# Patient Record
Sex: FEMALE | Race: ASIAN | NOT HISPANIC OR LATINO | Employment: PART TIME | URBAN - METROPOLITAN AREA
[De-identification: names, ages, dates, MRNs, and addresses within clinical notes are randomized per-mention and may not be internally consistent; named-entity substitution may affect disease eponyms.]

---

## 2017-03-30 ENCOUNTER — TELEPHONE (OUTPATIENT)
Dept: OBSTETRICS AND GYNECOLOGY | Facility: CLINIC | Age: 37
End: 2017-03-30

## 2017-03-30 NOTE — TELEPHONE ENCOUNTER
----- Message from Rocío Taveras sent at 3/29/2017  4:43 PM CDT -----  Contact: Patient  X _1st Request  _  2nd Request  _  3rd Request    Who:CONRADO REYES [[id number]]    Why:Patient has a appointment for 03/30/2017 but she has started her menstrual cycle and she needs to reschedule her appointment     What Number to Call Back:Patient can be reached at [phone]    When to Expect a call back: (Before the end of the day)   -- if call after 3:00 call back will be tomorrow.

## 2017-06-19 ENCOUNTER — OFFICE VISIT (OUTPATIENT)
Dept: OBSTETRICS AND GYNECOLOGY | Facility: CLINIC | Age: 37
End: 2017-06-19
Attending: OBSTETRICS & GYNECOLOGY
Payer: COMMERCIAL

## 2017-06-19 VITALS
DIASTOLIC BLOOD PRESSURE: 68 MMHG | WEIGHT: 165.13 LBS | BODY MASS INDEX: 28.19 KG/M2 | HEIGHT: 64 IN | SYSTOLIC BLOOD PRESSURE: 104 MMHG

## 2017-06-19 DIAGNOSIS — Z01.419 ENCOUNTER FOR GYNECOLOGICAL EXAMINATION WITHOUT ABNORMAL FINDING: Primary | ICD-10-CM

## 2017-06-19 DIAGNOSIS — N94.10 DYSPAREUNIA, FEMALE: ICD-10-CM

## 2017-06-19 PROCEDURE — 99395 PREV VISIT EST AGE 18-39: CPT | Mod: S$GLB,,, | Performed by: OBSTETRICS & GYNECOLOGY

## 2017-06-19 PROCEDURE — 99999 PR PBB SHADOW E&M-EST. PATIENT-LVL II: CPT | Mod: PBBFAC,,, | Performed by: OBSTETRICS & GYNECOLOGY

## 2017-06-19 RX ORDER — DICYCLOMINE HYDROCHLORIDE 20 MG/1
TABLET ORAL
Refills: 3 | COMMUNITY
Start: 2017-06-13 | End: 2018-04-24 | Stop reason: ALTCHOICE

## 2017-06-19 NOTE — LETTER
June 19, 2017      Ny Kwong MD  [address]  [address]  [facility] [address]           [facility] [address]  [address]  [facility] [address]  Phone: [phone]  Fax: [phone]          Patient: Venkatesh Pritchett   MR Number: [id number]   YOB: 1980   Date of Visit: 6/19/2017       Dear Dr. Ny Kwong:    Thank you for referring Venkatesh Pritchett to me for evaluation. Attached you will find relevant portions of my assessment and plan of care.    If you have questions, please do not hesitate to call me. I look forward to following Venkatesh Pritchett along with you.    Sincerely,    Amarilys Lomeli MD    Enclosure  CC:  No Recipients    If you would like to receive this communication electronically, please contact [email or web address] or [phone] to request more information on [employer] access.    For providers and/or their staff who would like to refer a patient to [facility], please contact us through our one-stop-shop provider referral line, [facility], at [phone].    If you feel you have received this communication in error or would no longer like to receive these types of communications, please e-mail [email or web address]

## 2017-06-19 NOTE — PROGRESS NOTES
Subjective:       Patient ID: Venkatesh Pritchett is a 36 y.o. female.    Chief Complaint:  Well Woman and Pelvic Pain (Pt reports that last month, she had some pelvic pain. Pt says that she has not had any since then.)      History of Present Illness  HPI  Venkatesh Pritchett is a 36 y.o. female  NEW TO ME here for her annual GYN exam.  She reports recent pelvic pain which has resolved. She also reports pain with sex since the birth of her son who is 2 years old. She recently weaned him in 2017, and is considering trying for pregnancy. Her dyspareunia is with first entry, although occurs /worsens if sex continues for some time. She was seen last year for this discomfort and was noted to have a slight tear at the site of her laceration repair at the center of the introitus, which was treated with Estrace cream, and the lesion resolved, but the pain continues.   She describes her periods as regular, normal flow, lasting 4 days.   Denies break through bleeding.   Denies vaginal itching or irritation.  Denies vaginal discharge.  She is sexually active. She has had 1 partner for the past 13 years .  She uses no method for contraception.   History of abnormal pap: No  Last Pap: approximate date  and was normal  Last MMG: None  Last Colonoscopy:  None  Denies domestic violence. She does feel safe at home.     Past Medical History:   Diagnosis Date    IBS (irritable bowel syndrome)      Past Surgical History:   Procedure Laterality Date    DILATION AND CURETTAGE OF UTERUS      [facility]     Social History     Social History    Marital status:      Spouse name: N/A    Number of children: N/A    Years of education: N/A     Occupational History    Not on file.     Social History Main Topics    Smoking status: Never Smoker    Smokeless tobacco: Never Used    Alcohol use No    Drug use: No    Sexual activity: Yes     Partners: Male     Birth control/ protection: None      Comment:  since   "    Other Topics Concern    Not on file     Social History Narrative    No narrative on file     Family History   Problem Relation Age of Onset    Hypertension Neg Hx     Colon cancer Neg Hx     Amblyopia Neg Hx     Blindness Neg Hx     Cataracts Neg Hx     Glaucoma Neg Hx     Macular degeneration Neg Hx     Retinal detachment Neg Hx     Strabismus Neg Hx     Breast cancer Neg Hx     Ovarian cancer Neg Hx      OB History      Para Term  AB Living    2 1 1  1 1    SAB TAB Ectopic Multiple Live Births    1   0 1          /68   Ht 5' 4" (1.626 m)   Wt 74.9 kg (165 lb 2 oz)   LMP 2017 (Approximate)   Breastfeeding? No   BMI 28.34 kg/m²         GYN & OB History  Patient's last menstrual period was 2017 (approximate).   Date of Last Pap: 2015    OB History    Para Term  AB Living   2 1 1  1 1   SAB TAB Ectopic Multiple Live Births   1   0 1      # Outcome Date GA Lbr Maurice/2nd Weight Sex Delivery Anes PTL Lv   2 Term 03/26/15 41w1d / 01:26 3.714 kg (8 lb 3 oz) M Vag-Spont EPI  NICOLE      Birth Comments: possible absent rt kidney per ultrasound   1 SAB  8w0d             Complications: H/O dilation and curettage          Review of Systems  Review of Systems   Constitutional: Negative for activity change, appetite change, fatigue and unexpected weight change.   HENT: Negative.    Eyes: Negative for visual disturbance.   Respiratory: Negative for shortness of breath and wheezing.    Cardiovascular: Negative for chest pain, palpitations and leg swelling.   Gastrointestinal: Negative for abdominal pain, bloating and blood in stool.   Endocrine: Negative for diabetes and hair loss.   Genitourinary: Positive for dyspareunia. Negative for decreased libido.   Musculoskeletal: Negative for back pain and joint swelling.   Skin:  Negative for no acne and hair changes.   Neurological: Negative for headaches.   Hematological: Does not bruise/bleed easily. "   Psychiatric/Behavioral: Negative for depression and sleep disturbance. The patient is not nervous/anxious.    Breast: Negative for breast pain and nipple discharge          Objective:    Physical Exam:   Constitutional: She is oriented to person, place, and time. She appears well-developed and well-nourished.    HENT:   Head: Normocephalic and atraumatic.    Eyes: EOM are normal. Pupils are equal, round, and reactive to light.    Neck: Normal range of motion. Neck supple.    Cardiovascular: Normal rate and regular rhythm.     Pulmonary/Chest: Effort normal and breath sounds normal.   BREASTS: Symmetrical, no skin changes or visible lesions.  No palpable masses, nipple discharge bilaterally.          Abdominal: Soft. Bowel sounds are normal.     Genitourinary:       Pelvic exam was performed with patient supine.   Genitourinary Comments: PELVIC: Normal external genitalia without lesions. Vaginal introitus slightly narrowed with band of scarred mucosa at introitus with point tenderness at apex of fourchette.. Normal hair distribution.  Adequate perineal body, normal urethral meatus.  Vagina moist and well rugated without lesions or discharge.  Cervix pink, without lesions, discharge or tenderness.  No significant cystocele or rectocele.  Bimanual exam shows uterus to be normal size, regular, mobile and nontender.  Adnexa without masses or tenderness.               Musculoskeletal: Normal range of motion and moves all extremeties.       Neurological: She is alert and oriented to person, place, and time.    Skin: Skin is warm and dry.    Psychiatric: She has a normal mood and affect.          Assessment:        1. Encounter for gynecological examination without abnormal finding    2. Dyspareunia, female               Plan:      1. Encounter for gynecological examination without abnormal finding      2. Dyspareunia, female  Discussed narrowed introitus, and use of lubricants to ease discomfort with sex. Discussed  possible surgical release of narrowed tissue, but that there were no guarantees that this would alleviate dyspareunia, and that the area could improve with stretching. As patient plans to try for pregnancy soon, any surgery would delay conception as pelvic rest would be necessary for full healing.        Return in about 1 year (around 6/19/2018), or if symptoms worsen or fail to improve.

## 2017-07-17 ENCOUNTER — TELEPHONE (OUTPATIENT)
Dept: OBSTETRICS AND GYNECOLOGY | Facility: CLINIC | Age: 37
End: 2017-07-17

## 2017-07-17 NOTE — TELEPHONE ENCOUNTER
----- Message from Nessa Berry sent at 7/17/2017  2:10 PM CDT -----  Contact: Pt  x_ 1st Request  _ 2nd Request  _ 3rd Request    Who: Pt    Why: is calling in regards to a RX she is needing.     What Number to Call Back: [phone]    When to Expect a call back: (Before the end of the day)  -- if call after 3:00 call back will be tomorrow.

## 2017-07-17 NOTE — TELEPHONE ENCOUNTER
Pt needs a medication that her and Dr. Lomeli discussed, but she does npt remember the name of it. Pt stated that it was for when she is on her period. Pt stated that Dr. Lomeli never prescribed it. Pt informed that a message will be sent to Dr. Lomeli.

## 2017-07-19 DIAGNOSIS — N94.6 DYSMENORRHEA: Primary | ICD-10-CM

## 2017-07-19 DIAGNOSIS — N94.10 FEMALE DYSPAREUNIA: ICD-10-CM

## 2017-07-19 RX ORDER — NAPROXEN SODIUM 550 MG/1
550 TABLET ORAL 2 TIMES DAILY WITH MEALS
Qty: 60 TABLET | Refills: 4 | Status: SHIPPED | OUTPATIENT
Start: 2017-07-19 | End: 2017-08-18

## 2017-07-28 ENCOUNTER — OFFICE VISIT (OUTPATIENT)
Dept: URGENT CARE | Facility: CLINIC | Age: 37
End: 2017-07-28
Payer: COMMERCIAL

## 2017-07-28 VITALS
TEMPERATURE: 98 F | SYSTOLIC BLOOD PRESSURE: 111 MMHG | OXYGEN SATURATION: 99 % | BODY MASS INDEX: 27.31 KG/M2 | HEIGHT: 64 IN | DIASTOLIC BLOOD PRESSURE: 74 MMHG | HEART RATE: 85 BPM | WEIGHT: 160 LBS

## 2017-07-28 DIAGNOSIS — S29.011A STRAIN OF LEFT PECTORALIS MUSCLE, INITIAL ENCOUNTER: Primary | ICD-10-CM

## 2017-07-28 PROCEDURE — 99203 OFFICE O/P NEW LOW 30 MIN: CPT | Mod: S$GLB,,, | Performed by: FAMILY MEDICINE

## 2017-07-28 RX ORDER — FLUTICASONE PROPIONATE 50 MCG
2 SPRAY, SUSPENSION (ML) NASAL 2 TIMES DAILY
Refills: 1 | COMMUNITY
Start: 2017-07-13 | End: 2018-04-24 | Stop reason: ALTCHOICE

## 2017-07-28 RX ORDER — IBUPROFEN 400 MG/1
400 TABLET ORAL EVERY 6 HOURS PRN
Qty: 20 TABLET | Refills: 0 | Status: SHIPPED | OUTPATIENT
Start: 2017-07-28 | End: 2017-08-04

## 2017-07-28 RX ORDER — ACETAMINOPHEN 325 MG/1
325 TABLET ORAL EVERY 6 HOURS PRN
COMMUNITY
End: 2018-04-24 | Stop reason: ALTCHOICE

## 2017-07-29 NOTE — PATIENT INSTRUCTIONS
Ice pack when in pain.  Warm compress in between pain.  Ibuprofen.    Follow up with PCP/Specialist if not any better as discussed. To ER of [facility] for any worsening of symptoms.  Patient  voiced understanding and agreement.

## 2018-01-06 ENCOUNTER — OFFICE VISIT (OUTPATIENT)
Dept: URGENT CARE | Facility: CLINIC | Age: 38
End: 2018-01-06
Payer: COMMERCIAL

## 2018-01-06 VITALS
BODY MASS INDEX: 29.02 KG/M2 | HEART RATE: 78 BPM | RESPIRATION RATE: 20 BRPM | WEIGHT: 170 LBS | OXYGEN SATURATION: 99 % | HEIGHT: 64 IN | DIASTOLIC BLOOD PRESSURE: 70 MMHG | TEMPERATURE: 99 F | SYSTOLIC BLOOD PRESSURE: 100 MMHG

## 2018-01-06 DIAGNOSIS — H65.00 ACUTE SEROUS OTITIS MEDIA, RECURRENCE NOT SPECIFIED, UNSPECIFIED LATERALITY: ICD-10-CM

## 2018-01-06 DIAGNOSIS — H92.03 OTALGIA OF BOTH EARS: Primary | ICD-10-CM

## 2018-01-06 PROCEDURE — 99214 OFFICE O/P EST MOD 30 MIN: CPT | Mod: 25,S$GLB,, | Performed by: FAMILY MEDICINE

## 2018-01-06 PROCEDURE — 96372 THER/PROPH/DIAG INJ SC/IM: CPT | Mod: S$GLB,,, | Performed by: FAMILY MEDICINE

## 2018-01-06 RX ORDER — BETAMETHASONE SODIUM PHOSPHATE AND BETAMETHASONE ACETATE 3; 3 MG/ML; MG/ML
6 INJECTION, SUSPENSION INTRA-ARTICULAR; INTRALESIONAL; INTRAMUSCULAR; SOFT TISSUE
Status: COMPLETED | OUTPATIENT
Start: 2018-01-06 | End: 2018-01-06

## 2018-01-06 RX ORDER — AMOXICILLIN AND CLAVULANATE POTASSIUM 875; 125 MG/1; MG/1
1 TABLET, FILM COATED ORAL 2 TIMES DAILY
Qty: 30 TABLET | Refills: 0 | Status: SHIPPED | OUTPATIENT
Start: 2018-01-06 | End: 2018-01-21

## 2018-01-06 RX ADMIN — BETAMETHASONE SODIUM PHOSPHATE AND BETAMETHASONE ACETATE 6 MG: 3; 3 INJECTION, SUSPENSION INTRA-ARTICULAR; INTRALESIONAL; INTRAMUSCULAR; SOFT TISSUE at 06:01

## 2018-01-07 NOTE — PROGRESS NOTES
"Subjective:       Patient ID: Venkatesh Pritchett is a 37 y.o. female.    Vitals:  height is 5' 4" (1.626 m) and weight is 77.1 kg (170 lb). Her oral temperature is 98.9 °F (37.2 °C). Her blood pressure is 100/70 and her pulse is 78. Her respiration is 20 and oxygen saturation is 99%.     Chief Complaint: Otalgia    Otalgia    There is pain in both ears. This is a new problem. The current episode started in the past 7 days. The problem occurs constantly. The problem has been gradually worsening. There has been no fever. The pain is at a severity of 5/10. The pain is moderate. Associated symptoms include headaches and a sore throat. Pertinent negatives include no abdominal pain, coughing, diarrhea, rash or vomiting. She has tried nothing for the symptoms.     Review of Systems   Constitution: Positive for weakness. Negative for chills, fever and malaise/fatigue.   HENT: Positive for congestion, ear pain and sore throat. Negative for hoarse voice.    Eyes: Positive for redness. Negative for blurred vision and discharge.   Cardiovascular: Negative for chest pain, dyspnea on exertion and leg swelling.   Respiratory: Negative for cough, shortness of breath, sputum production and wheezing.    Skin: Negative for rash.   Musculoskeletal: Negative for back pain, joint pain and myalgias.   Gastrointestinal: Negative for abdominal pain, diarrhea, nausea and vomiting.   Neurological: Positive for headaches.   Psychiatric/Behavioral: The patient is not nervous/anxious.        Objective:      Physical Exam   Constitutional: She is oriented to person, place, and time. She appears well-developed and well-nourished. She is cooperative.  Non-toxic appearance. She does not appear ill. No distress.   HENT:   Head: Normocephalic and atraumatic.   Right Ear: Hearing, tympanic membrane, external ear and ear canal normal.   Left Ear: Hearing, tympanic membrane, external ear and ear canal normal.   Nose: Nose normal. No mucosal edema, rhinorrhea " or nasal deformity. No epistaxis. Right sinus exhibits no maxillary sinus tenderness and no frontal sinus tenderness. Left sinus exhibits no maxillary sinus tenderness and no frontal sinus tenderness.   Mouth/Throat: Uvula is midline, oropharynx is clear and moist and mucous membranes are normal. No trismus in the jaw. Normal dentition. No uvula swelling. No posterior oropharyngeal erythema.   Right tm with fluid and bulging.   Eyes: Conjunctivae and lids are normal. Right eye exhibits no discharge. Left eye exhibits no discharge. No scleral icterus.   Sclera clear bilat   Neck: Trachea normal, normal range of motion, full passive range of motion without pain and phonation normal. Neck supple.   Cardiovascular: Normal rate, regular rhythm, normal heart sounds, intact distal pulses and normal pulses.    Pulmonary/Chest: Effort normal and breath sounds normal. No respiratory distress.   Abdominal: Soft. Normal appearance and bowel sounds are normal. She exhibits no distension, no pulsatile midline mass and no mass. There is no tenderness.   Musculoskeletal: Normal range of motion. She exhibits no edema or deformity.   Neurological: She is alert and oriented to person, place, and time. She exhibits normal muscle tone. Coordination normal.   Skin: Skin is warm, dry and intact. She is not diaphoretic. No pallor.   Psychiatric: She has a normal mood and affect. Her speech is normal and behavior is normal. Judgment and thought content normal. Cognition and memory are normal.   Nursing note and vitals reviewed.      Assessment:       1. Otalgia of both ears    2. Acute serous otitis media, recurrence not specified, unspecified laterality        Plan:         Otalgia of both ears    Acute serous otitis media, recurrence not specified, unspecified laterality    Other orders  -     amoxicillin-clavulanate 875-125mg (AUGMENTIN) 875-125 mg per tablet; Take 1 tablet by mouth 2 (two) times daily.  Dispense: 30 tablet; Refill:  0        allegra d 12 one bid

## 2018-01-07 NOTE — PATIENT INSTRUCTIONS
Earache, No Infection (Adult)  Earaches can happen without an infection. This occurs when air and fluid build up behind the eardrum causing a feeling of fullness and discomfort and reduced hearing. This is called otitis media with effusion (OME) or serous otitis media. It means there is fluid in the middle ear. It is not the same as acute otitis media, which is typically from infection.  OME can happen when you have a cold if congestion blocks the passage that drains the middle ear. This passage is called the eustachian tube. OME may also occur with nasal allergies or after a bacterial middle ear infection.    The pain or discomfort may come and go. You may hear clicking or popping sounds when you chew or swallow. You may feel that your balance is off. Or you may hear ringing in the ear.  It often takes from several weeks up to 3 months for the fluid to clear on its own. Oral pain relievers and ear drops help if there is pain. Decongestants and antihistamines sometimes help. Antibiotics don't help since there is no infection. Your doctor may prescribe a nasal spray to help reduce swelling in the nose and eustachian tube. This can allow the ear to drain.  If your OME doesn't improve after 3 months, surgery may be used to drain the fluid and insert a small tube in the eardrum to allow continued drainage.  Because the middle ear fluid can become infected, it is important to watch for signs of an ear infection which may develop later. These signs include increased ear pain, fever, or drainage from the ear.  Home care  The following guidelines will help you care for yourself at home:  · You may use over-the-counter medicine as directed to control pain, unless another medicine was prescribed. If you have chronic liver or kidney disease or ever had a stomach ulcer or GI bleeding, talk with your doctor before using these medicines. Aspirin should never be used in anyone under 18 years of age who is ill with a fever. It  may cause severe liver damage.  · You may use over-the-counter decongestants such as phenylephrine or pseudoephedrine. But they are not always helpful. Don't use nasal spray decongestants more than 3 days. Longer use can make congestion worse. Prescription nasal sprays from your doctor don't typically have those restrictions.  · Antihistamines may help if you are also having allergy symptoms.  · You may use medicines such as guaifenesin to thin mucus and promote drainage.  Follow-up care  Follow up with your healthcare provider or as advised if you are not feeling better after 3 days.  When to seek medical advice  Call your healthcare provider right away if any of the following occur:  · Your ear pain gets worse or does not start to improve   · Fever of 100.4°F (38°C) or higher, or as directed by your healthcare provider  · Fluid or blood draining from the ear  · Headache or sinus pain  · Stiff neck  · Unusual drowsiness or confusion  Date Last Reviewed: 10/1/2016  © 3372-5938 [employer]. [address] [place] [address]. All rights reserved. This information is not intended as a substitute for professional medical care. Always follow your healthcare professional's instructions.        Reducing the Risk of Middle Ear Infections     Good handwashing can help your child prevent ear infections.   Most children have had at least one middle ear infection by the age of 2. Treatment may depend on whether the problem is acute or chronic. It also depends on how often it comes back and how long it lasts.  Reducing risk factors  Some behaviors or surroundings increase your childs risk of ear infection. Reducing such risk factors can be helpful at any point in treatment. The tips below may help:  · If your child goes to group , he or she runs a greater risk of getting colds or flu. This may then lead to an ear infection. Help prevent these illnesses by teaching your child to wash his or her hands  often.  · If your child has nasal allergies, do your best to control dust, mold, mildew, and pet hair in the house. Also stop or greatly limit your childs contact with secondhand smoke.  · If food allergies are a problem, identify the food that triggers the reaction. Help your child avoid it.  Watching and waiting  Sometimes it is better to proceed with caution in the following ways:  · If your child is diagnosed with an ear infection, the healthcare provider may prescribe antibiotics and will suggest a period of watchful waiting. This means not filling any prescriptions right away. Instead of antibiotics, a trial of medicines to relieve symptoms including those for pain or fever, is advised. This is along with waiting some time to see if a child improves without antibiotic therapy. Whether or not your healthcare provider prescribes immediate antibiotics or a period of watchful waiting depends on your child's age and risk factors.  · During this time, your child should be watched to see if his or her symptoms are improving and to make sure new symptoms, such as fever or vomiting, don't develop. If a child doesn't improve within a few days or develops new symptoms, antibiotics will usually be started.    Date Last Reviewed: 12/1/2016  © 8645-4072 [employer]. [address] [place] [address], [place] [address]. All rights reserved. This information is not intended as a substitute for professional medical care. Always follow your healthcare professional's instructions.

## 2018-02-20 ENCOUNTER — OFFICE VISIT (OUTPATIENT)
Dept: INTERNAL MEDICINE | Facility: CLINIC | Age: 38
End: 2018-02-20
Payer: COMMERCIAL

## 2018-02-20 VITALS
DIASTOLIC BLOOD PRESSURE: 66 MMHG | OXYGEN SATURATION: 99 % | TEMPERATURE: 98 F | HEART RATE: 86 BPM | HEIGHT: 64 IN | WEIGHT: 176.13 LBS | SYSTOLIC BLOOD PRESSURE: 116 MMHG | BODY MASS INDEX: 30.07 KG/M2

## 2018-02-20 DIAGNOSIS — R51.9 ACUTE NONINTRACTABLE HEADACHE, UNSPECIFIED HEADACHE TYPE: Primary | ICD-10-CM

## 2018-02-20 PROCEDURE — 99999 PR PBB SHADOW E&M-EST. PATIENT-LVL IV: CPT | Mod: PBBFAC,,, | Performed by: INTERNAL MEDICINE

## 2018-02-20 PROCEDURE — 99213 OFFICE O/P EST LOW 20 MIN: CPT | Mod: S$GLB,,, | Performed by: INTERNAL MEDICINE

## 2018-02-20 PROCEDURE — 3008F BODY MASS INDEX DOCD: CPT | Mod: S$GLB,,, | Performed by: INTERNAL MEDICINE

## 2018-02-20 RX ORDER — BUTALBITAL, ACETAMINOPHEN AND CAFFEINE 50; 325; 40 MG/1; MG/1; MG/1
1 TABLET ORAL EVERY 4 HOURS PRN
Qty: 30 TABLET | Refills: 0 | Status: SHIPPED | OUTPATIENT
Start: 2018-02-20 | End: 2018-03-22

## 2018-02-20 NOTE — PROGRESS NOTES
Subjective:       Patient ID: Venkatesh Pritchett is a 37 y.o. female.    Chief Complaint: Dizziness    Dizziness:   Chronicity:  Recurrent  Onset:  1 to 4 weeks ago  Progression since onset:  Waxing and waning and unchanged  Frequency:  Every few days  Sore Throat Pain Scale: associated with left parietal headache that is moderate.  Severity:  Mild  Dizziness characteristics:  Spacial disorientation   Associated symptoms: headaches.no ear pain, no fever, no nausea, no vomiting, no weakness and no chest pain.  Exacerbated by: anxiety.  Treatments tried:  Nothing  Improvements on treatment:  No relief   PMH includes: CT head.no strokes, no neurologic disease, no head trauma and no head trauma.   has been told she has migraine headache.  CT head and neck was negative a few years ago    Review of Systems   Constitutional: Negative for activity change, chills, fatigue and fever.   HENT: Negative for congestion, ear pain, nosebleeds, postnasal drip, sinus pressure and sore throat.    Eyes: Negative.  Negative for visual disturbance.   Respiratory: Negative for cough, chest tightness, shortness of breath and wheezing.    Cardiovascular: Negative for chest pain.   Gastrointestinal: Negative for abdominal pain, diarrhea, nausea and vomiting.   Genitourinary: Negative for difficulty urinating, dysuria, frequency and urgency.   Musculoskeletal: Negative for arthralgias and neck stiffness.   Skin: Negative for rash.   Neurological: Positive for dizziness and headaches. Negative for weakness.   Psychiatric/Behavioral: Negative for sleep disturbance. The patient is not nervous/anxious.        Objective:      Physical Exam   Constitutional: She is oriented to person, place, and time. She appears well-developed and well-nourished.  Non-toxic appearance. No distress.   HENT:   Head: Normocephalic and atraumatic.   Right Ear: Tympanic membrane, external ear and ear canal normal.   Left Ear: Tympanic membrane, external ear and ear canal  normal.   Eyes: Pupils are equal, round, and reactive to light. No scleral icterus. Right eye exhibits nystagmus. Left eye exhibits nystagmus.       Neck: Normal range of motion. Neck supple. No thyromegaly present.   Cardiovascular: Normal rate, regular rhythm and normal heart sounds.    Pulmonary/Chest: Effort normal and breath sounds normal.   Abdominal: Soft. Bowel sounds are normal. She exhibits no mass. There is no tenderness. There is no rebound.   Musculoskeletal: Normal range of motion.   Lymphadenopathy:     She has no cervical adenopathy.   Neurological: She is alert and oriented to person, place, and time. She has normal reflexes. She displays normal reflexes. No cranial nerve deficit. She exhibits normal muscle tone. Coordination normal.   Skin: Skin is warm and dry.   Psychiatric: She has a normal mood and affect. Her behavior is normal.       Assessment:       1. Acute nonintractable headache, unspecified headache type        Plan:   Venkatesh was seen today for dizziness.    Diagnoses and all orders for this visit:    Acute nonintractable headache, unspecified headache type  -     Ambulatory consult to Neurology    Other orders  -     butalbital-acetaminophen-caffeine -40 mg (FIORICET, ESGIC) -40 mg per tablet; Take 1 tablet by mouth every 4 (four) hours as needed for Pain.

## 2018-02-27 ENCOUNTER — TELEPHONE (OUTPATIENT)
Dept: NEUROLOGY | Facility: CLINIC | Age: 38
End: 2018-02-27

## 2018-02-27 NOTE — TELEPHONE ENCOUNTER
----- Message from Laurel Wilson sent at 2/27/2018  9:47 AM CST -----  Contact: Patient's , Milton Dupont  Patient's  is calling to schedule an appointment for patient from a referral.  Please call to schedule.  Call Back#[phone]  Thanks

## 2018-02-27 NOTE — TELEPHONE ENCOUNTER
Called pt's  and scheduled new patient appointment. Appointment successfully made, pt added to wait list. Verbalized understanding.

## 2018-02-28 ENCOUNTER — TELEPHONE (OUTPATIENT)
Dept: NEUROLOGY | Facility: CLINIC | Age: 38
End: 2018-02-28

## 2018-02-28 NOTE — TELEPHONE ENCOUNTER
Spoke with Mrs. Pritchett-    I called to inquire about the reason for an appt she has booked with our office (Neurology) on Friday with Ramila CLINTON, she informed me that she's coming in to see someone for headaches. I explained that the N.P. She's scheduled with ONLY specializes in headaches, when asked if she was having dizziness she said yes, I offered to schedule her with another provider who can treat both but she asked to keep her appt Friday.

## 2018-03-02 ENCOUNTER — OFFICE VISIT (OUTPATIENT)
Dept: NEUROLOGY | Facility: CLINIC | Age: 38
End: 2018-03-02
Payer: COMMERCIAL

## 2018-03-02 VITALS
BODY MASS INDEX: 30.11 KG/M2 | DIASTOLIC BLOOD PRESSURE: 68 MMHG | WEIGHT: 176.38 LBS | HEIGHT: 64 IN | SYSTOLIC BLOOD PRESSURE: 102 MMHG | HEART RATE: 73 BPM

## 2018-03-02 DIAGNOSIS — E66.9 OBESITY (BMI 30.0-34.9): ICD-10-CM

## 2018-03-02 DIAGNOSIS — R51.9 WORSENING HEADACHES: ICD-10-CM

## 2018-03-02 DIAGNOSIS — F41.9 ANXIETY: ICD-10-CM

## 2018-03-02 DIAGNOSIS — G47.9 TROUBLE IN SLEEPING: ICD-10-CM

## 2018-03-02 DIAGNOSIS — R51.9 CHRONIC NONINTRACTABLE HEADACHE, UNSPECIFIED HEADACHE TYPE: Primary | ICD-10-CM

## 2018-03-02 DIAGNOSIS — G89.29 CHRONIC NONINTRACTABLE HEADACHE, UNSPECIFIED HEADACHE TYPE: Primary | ICD-10-CM

## 2018-03-02 DIAGNOSIS — R53.83 FATIGUE, UNSPECIFIED TYPE: ICD-10-CM

## 2018-03-02 DIAGNOSIS — M79.18 MYOFASCIAL PAIN: ICD-10-CM

## 2018-03-02 DIAGNOSIS — R42 DIZZINESS: ICD-10-CM

## 2018-03-02 PROCEDURE — 99204 OFFICE O/P NEW MOD 45 MIN: CPT | Mod: S$GLB,,, | Performed by: NURSE PRACTITIONER

## 2018-03-02 PROCEDURE — 99999 PR PBB SHADOW E&M-EST. PATIENT-LVL III: CPT | Mod: PBBFAC,,, | Performed by: NURSE PRACTITIONER

## 2018-03-02 RX ORDER — AMITRIPTYLINE HYDROCHLORIDE 10 MG/1
10 TABLET, FILM COATED ORAL NIGHTLY
Qty: 30 TABLET | Refills: 5 | Status: SHIPPED | OUTPATIENT
Start: 2018-03-02 | End: 2018-04-24 | Stop reason: ALTCHOICE

## 2018-03-02 RX ORDER — TIZANIDINE 2 MG/1
TABLET ORAL
Qty: 30 TABLET | Refills: 2 | Status: SHIPPED | OUTPATIENT
Start: 2018-03-02 | End: 2018-04-24 | Stop reason: ALTCHOICE

## 2018-03-02 RX ORDER — DICLOFENAC SODIUM 75 MG/1
TABLET, DELAYED RELEASE ORAL
Qty: 20 TABLET | Refills: 2 | Status: SHIPPED | OUTPATIENT
Start: 2018-03-02 | End: 2018-04-03

## 2018-03-02 NOTE — PATIENT INSTRUCTIONS
Sleep Hygiene:  1. Avoid watching TV, eating, and discussing emotional issues in bed. The bed should be used for sleep and sex only. If not, we can associate the bed with other activities and it often becomes difficult to fall asleep.  2. Minimize noise, light, and temperature extremes during sleep with ear plugs, window blinds, or an electric blanket or air conditioner. Even the slightest nighttime noises or luminescent lights can disrupt the quality of your sleep. Try to keep your bedroom at a comfortable temperature -- not too hot (above 75 degrees) or too cold (below 54 degrees).  3. Try not to drink fluids after 8 p.m. This may reduce awakenings due to urination.  4. Avoid naps, but if you do nap, make it no more than about 25 minutes about eight hours after you awake. But if you have problems falling asleep, then no naps for you.  5. Do not expose your self to bright light if you need to get up at night. Use a small night-light instead.  6. Nicotine is a stimulant and should be avoided particularly near bedtime and upon night awakenings. Having a smoke before bed, although it may feel relaxing, is actually putting a stimulant into your bloodstream.  7. Caffeine is also a stimulant and is present in coffee (100-200 mg), soda (50-75 mg), tea (50-75 mg), and various over-the-counter medications. Caffeine should be discontinued at least four to six hours before bedtime. If you consume large amounts of caffeine and you cut your self off too quickly, beware; you may get headaches that could keep you awake.  8. Although alcohol is a depressant and may help you fall asleep, the subsequent metabolism that clears it from your body when you are sleeping causes a withdrawal syndrome. This withdrawal causes awakenings and is often associated with nightmares and sweats.  9. A light snack may be sleep-inducing, but a heavy meal too close to bedtime interferes with sleep. Stay away from protein and stick to carbohydrates or  dairy products. Milk contains the amino acid L-tryptophan, which has been shown in research to help people go to sleep. So milk and cookies or crackers (without chocolate) may be useful and taste good as well.      Please start tracking your headaches, you can use a journal or just write it down on a piece of pater.    Information I want you to pay attention to is:  - Write down what days you get a headache  - How long does the headache last  - What symptoms come with the headache -  Please pay attention to wthether or not you are sensitive to light or sound     As discussed, please make an effort to take some time out of the day to take care of yourself.  Exercise is a good way to relieve stress, will also help with weight loss.      Hopefully, the Elavil and Zanaflex nightly will get you some good sleep!!

## 2018-03-02 NOTE — PROGRESS NOTES
SUBJECTIVE:  Patient ID: Venkatesh Pritchett   MRN: [id number]  Referred By: Dr. Kimberly Miller  Chief Complaint: Headache and Consult    History of Present Illness:   37 y.o. female with headaches, allergies, IBS, anxiety, depression, and trouble sleeping, who presents to clinic with her  for evaluation of headaches.  History is provided by both the patient and her .     Headaches initially began in her early thirties and have persisted since.  Headaches are mainly on the left side above her eye and in the parietal region, described as pounding, pressure, but can also be stabbing pains.  Pain waxes and wanes throughout the day, though she reports when she is busy she is not as aware of it as when she is thinking about it. Onset is abrupt. Pain can be rated anywhere from 3-9 out of 10, reports pain becomes severe 3 days per week.  Headaches last between 6 hours up to all day, have experienced headaches which lasted 6 days in the past.  Associated symptoms include photo/phonophobia (with severe headaches) and some dizziness. Worsened by movement/activity.   Headaches have been occurring daily since October.  She reports having gone back home with her son from August to October, she was experiencing headaches while in [place], but they have become far more frequent since she returned.  Both the patient and her  she has been very home sick since coming back to the [place], she adds it is very difficult to raise a young child without any help here, her  works long hours and she has no family here to help her and feels overwhelmed.  While in [place], she was seen by her Neurologist there who did not feel headaches were coming from a secondary source, though she continues to have concerns there is something wrong with her which is causing the headaches. In the past, she was started on Amitriptyline, which was effective for migraine prevention.  She has had CT Scan of her head before in the past which was  reportedly normal.  She does admit the headache she is currently experiencing are similar to headaches she has always had, just more frequent and more intense.    She secondarily has been experiencing bouts of dizziness, which she describes as feeling as though the room is spinning.  She reports these symptoms can occur with or without the headaches.  Only occurs when she goes from a sitting to standing position.    Associated Symptoms - photo/phonophobia   Triggers - anxiety, poor sleep   Aggravating Factors - anxiety   Alleviating Factors - sleep, relaxing   Head Trauma? Infection? Fever? Cancer? Pregnancy? <-- denies   Recent Changes - returned home from [place], weight gain    Sleep - no trouble falling asleep, but wakes up every 2-3 hours, she does not feel rested in the morning   Positives in bold: Hx of Kidney Stones, asthma, GI bleed, osteoporosis, CAD/MI, CVA/TIA, DM <-- denies     Treatments Tried and Response  Fioricet - no      Current Medications:    butalbital-acetaminophen-caffeine -40 mg (FIORICET, ESGIC) -40 mg per tablet, Take 1 tablet by mouth every 4 (four) hours as needed for Pain., Disp: 30 tablet, Rfl: 0    acetaminophen (TYLENOL) 325 MG tablet, Take 325 mg by mouth every 6 (six) hours as needed for Pain., Disp: , Rfl:     amitriptyline (ELAVIL) 10 MG tablet, Take 1 tablet (10 mg total) by mouth every evening., Disp: 30 tablet, Rfl: 5    diclofenac (VOLTAREN) 75 MG EC tablet, 1 tab every 8-10 hours as needed for headaches. Max 2 tabs/day. Max 3 days/week., Disp: 20 tablet, Rfl: 2    dicyclomine (BENTYL) 20 mg tablet, TAKE ONE-HALF TABLET (TEN MG) BY MOUTH THREE TIMES DAILY, Disp: , Rfl: 3    fluticasone (FLONASE) 50 mcg/actuation nasal spray, 2 sprays by Each Nare route 2 (two) times daily., Disp: , Rfl: 1    tiZANidine (ZANAFLEX) 2 MG tablet, 1/2 tab by mouth around bedtime x 3 days, then 1 tab nightly.  No driving., Disp: 30 tablet, Rfl: 2    Review of Systems - as per HPI,  "otherwise a balanced 10 systems review is negative.    OBJECTIVE:  Vitals:  /68   Pulse 73   Ht 5' 4" (1.626 m)   Wt 80 kg (176 lb 5.9 oz)   LMP 02/19/2018   BMI 30.27 kg/m²     Physical Exam   Constitutional: She appears well-developed and well-nourished. She is well groomed. NAD  HENT:    Head: Normocephalic and atraumatic, oral and nasal mucosa intact.  Frontalis was TTP, temporalis was TTP on left, NTTP on right    Eyes: Conjunctivae and EOM are normal, slight left eye exopthalmos. Pupils are equal, round, and reactive to light   Neck: Neck supple. Occiput NTTP and trapezius TTP bilaterally.  Traps and paraspinals tight bilaterally.  DROM of neck in all 6 directions.   Musculoskeletal: Normal range of motion. No joint stiffness. No vertebral point tenderness.  Skin: Skin is warm and dry.  Psychiatric: Mood is anxious      Neuro exam:    Mental status:  The patient is awake, alert, and oriented to person, place and time.  Language is intact and fluent  Remote and recent memory are intact  Normal attention and concentration  Mood is stable    Cranial Nerves:  Fundoscopic examination does not reveal any occult papilledema.    Pupils are equal and reactive to light.    Extraocular movements are intact and without nystagmus.    Visual fields are full to confrontation testing.   Facial movement is symmetric.  Facial sensation is intact.    Hearing is intact to finger rub   Uvula in midline. Tongue in midline without fasciculation.   Shoulder shrug symmetrical.    Coordination:     Finger to nose - normal and symmetric bilaterally   Heel to shin test - normal and symmetric bilaterally     Motor:  Normal muscle bulk and symmetry. No fasciculations were noted.   Tremor not apparent   Pronator drift not apparent.    strength was strong and symmetric   Finger extension strength was strong and symmetric   RUE:appropriate against gravity and medium force as tested 5/5  LUE: appropriate against gravity and " medium force as tested 5/5  RLE:appropriate against gravity and medium force as tested 5/5              LLE: appropriate against gravity and medium force as tested 5/5    Reflexes:  Right Brachioradialis 2+  Left Brachioradialis 2+  Right Biceps 2+  Left Biceps 2+  Right Patellar2+  Left Patellar 2+                          Plantar flexion bilat   Aguilar was negative bilaterally      Sensory:  RUE  intact light touch  LUE intact light touch    RLE intact light touch  LLE intact light touch    Gait:   Romberg - negative  Normal gait  Tandem, Heel, and Toe Walk - able to perform without difficulty     Review of Data:   Notes from Dr. Bazan and urgent care reviewed   Labs:  No visits with results within 3 Month(s) from this visit.   Latest known visit with results is:   Office Visit on 05/10/2016   Component Date Value Ref Range Status    Specimen UA 05/10/2016 Urine, Clean Catch   Final    Color, UA 05/10/2016 Yellow  Yellow, Straw, Edna Final    Appearance, UA 05/10/2016 Clear  Clear Final    pH, UA 05/10/2016 7.0  5.0 - 8.0 Final    Specific Gravity, UA 05/10/2016 1.020  1.005 - 1.030 Final    Protein, UA 05/10/2016 Negative  Negative Final    Glucose, UA 05/10/2016 Negative  Negative Final    Ketones, UA 05/10/2016 Negative  Negative Final    Bilirubin (UA) 05/10/2016 Negative  Negative Final    Occult Blood UA 05/10/2016 Negative  Negative Final    Nitrite, UA 05/10/2016 Negative  Negative Final    Urobilinogen, UA 05/10/2016 Negative  <2.0 EU/dL Final    Leukocytes, UA 05/10/2016 Negative  Negative Final     Results for CONRADO REYES (MRN [id number]) as of 3/5/2018 11:27   Ref. Range 8/27/2015 16:16   Vit D, 25-Hydroxy Latest Ref Range: 30 - 96 ng/mL 22 (L)   Results for CONRADO REYES (MRN [id number]) as of 3/5/2018 11:27   Ref. Range 3/27/2015 06:20   WBC Latest Ref Range: 3.90 - 12.70 K/uL 15.27 (H)   RBC Latest Ref Range: 4.00 - 5.40 M/uL 3.73 (L)   Hemoglobin Latest Ref Range: 12.0 - 16.0 g/dL 10.5  (L)   Hematocrit Latest Ref Range: 37.0 - 48.5 % 31.7 (L)   MCV Latest Ref Range: 82 - 98 fL 85   MCH Latest Ref Range: 27.0 - 31.0 pg 28.2   MCHC Latest Ref Range: 32.0 - 36.0 % 33.1   RDW Latest Ref Range: 11.5 - 14.5 % 15.0 (H)   Platelets Latest Ref Range: 150 - 350 K/uL 206   MPV Latest Ref Range: 9.2 - 12.9 fL 9.1 (L)   Gran% Latest Ref Range: 38.0 - 73.0 % 74.0 (H)   Gran # (ANC) Latest Ref Range: 1.8 - 7.7 K/uL 11.3 (H)   Lymph% Latest Ref Range: 18.0 - 48.0 % 18.4   Lymph # Latest Ref Range: 1.0 - 4.8 K/uL 2.8   Mono% Latest Ref Range: 4.0 - 15.0 % 6.2   Mono # Latest Ref Range: 0.3 - 1.0 K/uL 0.9   Eosinophil% Latest Ref Range: 0.0 - 8.0 % 1.1   Eos # Latest Ref Range: 0.0 - 0.5 K/uL 0.2   Basophil% Latest Ref Range: 0.0 - 1.9 % 0.3   Baso # Latest Ref Range: 0.00 - 0.20 K/uL 0.04     Note: I have independently reviewed any/all imaging/labs/tests and agree with the report (s) as documented.  Any discrepancies will be as noted/demarcated by free text.  MARY BETH BRANDON 3/2/2018    ASSESSMENT:  1. Chronic nonintractable headache, unspecified headache type    2. Anxiety    3. Trouble in sleeping    4. Dizziness    5. Myofascial pain    6. Fatigue, unspecified type    7. Worsening headaches    8. Obesity (BMI 30.0-34.9)      PLAN:  - Lengthy discussion regarding headaches, as headaches are similar to the headaches she has always experienced and neuro exam is unremarkable, I do not feel repeat imaging is needed at this time, but offered to order MRI Brain to give her peace of mind, which she deferred at this time.  Multiple lifestyle factors likely contributing to headaches including anxiety, poor sleep hygiene, being homesick, poor diet, lack of exercise, lack of support system in  and weight gain.    - CBC, CMP, Vitamin D, TSH, and free T4 ordered - has had low vitamin D level in past, not currently taking a supplements   - To help with headache prevention, sleep, and anxiety - start Elavil 10 mg  - Will also  "start Zanaflex 2 mg nightly to help with neck stiffness and sleep   - For acute headaches - given diclofenac 75 mg tabs to be taken every 8-10 hours as needed for headaches   - Sleep hygiene tips provided   - Strongly advised she begin tracking her headaches as she is describing migrainous and non-migrainous features, discussed diligent tracking can help narrow down the type of headache she is suffering from   - Recommended she make an effort to create some "me time" each day, also encouraged her to follow a healthy diet and begin exercising routinely   - RTC in 1 month or sooner if needed      Orders Placed This Encounter    CBC auto differential    Comprehensive metabolic panel    VITAMIN D    TSH    T4, FREE    amitriptyline (ELAVIL) 10 MG tablet    tiZANidine (ZANAFLEX) 2 MG tablet    diclofenac (VOLTAREN) 75 MG EC tablet     I have discussed realistic goals of care with patient at length as well as medication options, and need for lifestyle adjustment. I have explained that treatment will take time. We have agreed that the goal will be to reduce frequency/intensity/quantity of HA, not to be completely HA free. I have explained my non narcotic policy regarding headache treatment.    Patient to track frequency of headaches.  Patient agreeable to work on lifestyle adjustments.    I spent 50 minutes face-to-face with the patient with >50% of the time spent with counseling and education regarding:  - results of data, diagnosis, and recommendations stated above  - risks, benefits, and potential side effects of amitriptyline, zanaflex, and diclofenac discussed   - alternative treatment options including nortriptyline vs effexor offered   - importance of healthy diet, regular exercise and sleep hygiene in the treatment of headaches      Questions and concerns were sought and answered to the patient's stated verbal satisfaction.  The patient verbalizes understanding and agreement with the above stated treatment " plan.       Ramila Benjamin, FNP-C  [facility]  [phone]    Dr. Faustin was available during today's encounter.

## 2018-03-05 ENCOUNTER — TELEPHONE (OUTPATIENT)
Dept: NEUROLOGY | Facility: CLINIC | Age: 38
End: 2018-03-05

## 2018-03-13 ENCOUNTER — NURSE TRIAGE (OUTPATIENT)
Dept: ADMINISTRATIVE | Facility: CLINIC | Age: 38
End: 2018-03-13

## 2018-03-13 NOTE — TELEPHONE ENCOUNTER
Reason for Disposition   Neurologic deficit that was brief (now gone), ANY of the following: * Weakness of the face, arm, or leg on one side of the body * Numbness of the face, arm, or leg on one side of the body * Loss of speech or garbled speech    Protocols used: ST NEUROLOGIC DEFICIT-A-[place]  Caller transferred by scheduling. Patient's  Milton reports that Ms. Pritchett is experiencing numbness in her shoulder and leg with a rapid heart beat, but symptoms have improved. He is not present with the patient. Unable to perform triage. Caller advised to bring patient to ED.

## 2018-04-03 ENCOUNTER — OFFICE VISIT (OUTPATIENT)
Dept: OBSTETRICS AND GYNECOLOGY | Facility: CLINIC | Age: 38
End: 2018-04-03
Attending: OBSTETRICS & GYNECOLOGY
Payer: COMMERCIAL

## 2018-04-03 VITALS
HEIGHT: 64 IN | WEIGHT: 174.81 LBS | DIASTOLIC BLOOD PRESSURE: 60 MMHG | BODY MASS INDEX: 29.84 KG/M2 | SYSTOLIC BLOOD PRESSURE: 118 MMHG

## 2018-04-03 DIAGNOSIS — R35.0 URINARY FREQUENCY: Primary | ICD-10-CM

## 2018-04-03 DIAGNOSIS — R10.2 PELVIC PRESSURE IN FEMALE: ICD-10-CM

## 2018-04-03 DIAGNOSIS — R39.15 URINARY URGENCY: ICD-10-CM

## 2018-04-03 PROCEDURE — 99999 PR PBB SHADOW E&M-EST. PATIENT-LVL III: CPT | Mod: PBBFAC,,, | Performed by: OBSTETRICS & GYNECOLOGY

## 2018-04-03 PROCEDURE — 99213 OFFICE O/P EST LOW 20 MIN: CPT | Mod: S$GLB,,, | Performed by: OBSTETRICS & GYNECOLOGY

## 2018-04-03 PROCEDURE — 87086 URINE CULTURE/COLONY COUNT: CPT

## 2018-04-03 PROCEDURE — 81003 URINALYSIS AUTO W/O SCOPE: CPT

## 2018-04-03 NOTE — PATIENT INSTRUCTIONS
Preparing for Pregnancy  Even before you become pregnant, your health matters to your future baby. Adopt good health habits today. And take care of any medical problems you have before becoming pregnant.  Remember: As soon as you know you are pregnant, get regular prenatal care.   Things to consider  Read through the list below. The more items that describe you, the healthier you may be.  · I eat a balanced diet with fruits, vegetables, and whole grains  · I keep physically active.  · I have my health problems under control.  · My weight is about right.  · I dont smoke.  · I dont use recreational drugs.  · I dont have a drinking problem.  Think about the following:  · Who will help you through pregnancy and with childcare?  · Do you have health insurance?  · Do you have the money needed to cover childcare and other day-to-day child expenses?  · Will you be able to take the time you need away from your job for maternity needs and childcare?  Adopt a healthy lifestyle  Each of the following tips can improve your health as you prepare for pregnancy:  · Take a daily vitamin supplement that contains iron and folic acid (a vitamin that reduces the chance of some birth defects)   · Eat a high-fiber diet rich in fruits and vegetables.  · Exercise 3 or more times a week and at least 150 minutes weekly.  · Get within 15 pounds of your ideal weight.  The first weeks of pregnancy are the most important time in a babys development. Before you become pregnant:  · Dont use recreational drugs.  · Dont drink alcohol.  · Dont smoke.  Working with your healthcare provider  Your healthcare provider can help answer any questions you may have. Do you know when to stop taking birth control pills? Are any over-the-counter medicines safe for pregnant women? You can also ask about special care you may need if you have any of the following:  · Sexually transmitted diseases (STDs), like herpes or chlamydia  · Diabetes  · High blood  pressure  · Other chronic health problems   Date Last Reviewed: 8/16/2015  © 3862-5174 [employer]. [address] [place] [address], [place] [address]. All rights reserved. This information is not intended as a substitute for professional medical care. Always follow your healthcare professional's instructions.

## 2018-04-03 NOTE — PROGRESS NOTES
Venkatesh Pritchett is a 37 y.o. female patient   NEW TO ME (former patient of Dr. Kwong) who presents today with complaints of 4 days of pelvic pressure and urinary frequency and urgency. She and her spouse are trying for pregnancy since last month.   Periods are regular, every 28-30 days, lasting 4-5 days, moderate.     Patient's last menstrual period was 03/15/2018 (approximate).    Past Medical History:   Diagnosis Date    IBS (irritable bowel syndrome)      Past Surgical History:   Procedure Laterality Date    DILATION AND CURETTAGE OF UTERUS      MAB     Social History     Social History    Marital status:      Spouse name: N/A    Number of children: N/A    Years of education: N/A     Occupational History    Not on file.     Social History Main Topics    Smoking status: Never Smoker    Smokeless tobacco: Never Used    Alcohol use No    Drug use: No    Sexual activity: Yes     Partners: Male     Birth control/ protection: None      Comment:  since      Other Topics Concern    Not on file     Social History Narrative    No narrative on file     Family History   Problem Relation Age of Onset    Hypertension Neg Hx     Colon cancer Neg Hx     Amblyopia Neg Hx     Blindness Neg Hx     Cataracts Neg Hx     Glaucoma Neg Hx     Macular degeneration Neg Hx     Retinal detachment Neg Hx     Strabismus Neg Hx     Breast cancer Neg Hx     Ovarian cancer Neg Hx      OB History      Para Term  AB Living    2 1 1   1 1    SAB TAB Ectopic Multiple Live Births    1     0 1                ROS:  GENERAL: Feeling well overall.   SKIN: Denies rash or lesions.   HEAD: Denies head injury or headache.   NODES: Denies enlarged lymph nodes.   CHEST: Denies chest pain or shortness of breath.   CARDIOVASCULAR: Denies palpitations or left sided chest pain.   ABDOMEN: No abdominal pain, nausea, vomiting or rectal bleeding.   URINARY: No dysuria, hematuria, or burning on  "urination.  REPRODUCTIVE: See HPI.   BREASTS: Denies pain, lumps, or nipple discharge.   HEMATOLOGIC: No easy bruisability or excessive bleeding.   MUSCULOSKELETAL: Denies joint pain or swelling.   NEUROLOGIC: Denies syncope or weakness.   PSYCHIATRIC: Denies depression.    /60   Ht 5' 4" (1.626 m)   Wt 79.3 kg (174 lb 13.2 oz)   LMP 03/15/2018 (Approximate)   BMI 30.01 kg/m²     PE:   APPEARANCE: Well nourished, well developed, in no acute distress.  ABDOMEN: Soft. No tenderness or masses. No hernias. No CVA tenderness.  VULVA: No lesions. Normal female genitalia.  URETHRAL MEATUS: Normal size and location, no lesions, no prolapse.  URETHRA: No masses, tenderness, prolapse or scarring.  VAGINA: Moist and well rugated, no discharge, no significant cystocele or rectocele.  CERVIX: No lesions and discharge.   UTERUS: Normal size, regular shape, mobile, non-tender, bladder base nontender.  ADNEXA: No masses, tenderness or CDS nodularity.  ANUS PERINEUM: Normal.    PROCEDURES:    1. Urinary frequency  Urine culture    Urinalysis   2. Urinary urgency  Urine culture    Urinalysis   3. Pelvic pressure in female      AND PLAN:      1. Urinary frequency    - Urine culture  - Urinalysis    2. Urinary urgency    - Urine culture  - Urinalysis    3. Pelvic pressure in female         Follow-up if symptoms worsen or fail to improve.    "

## 2018-04-04 LAB
BILIRUB UR QL STRIP: NEGATIVE
CLARITY UR REFRACT.AUTO: ABNORMAL
COLOR UR AUTO: YELLOW
GLUCOSE UR QL STRIP: NEGATIVE
HGB UR QL STRIP: NEGATIVE
KETONES UR QL STRIP: NEGATIVE
LEUKOCYTE ESTERASE UR QL STRIP: NEGATIVE
NITRITE UR QL STRIP: NEGATIVE
PH UR STRIP: 5 [PH] (ref 5–8)
PROT UR QL STRIP: NEGATIVE
SP GR UR STRIP: 1.02 (ref 1–1.03)
URN SPEC COLLECT METH UR: ABNORMAL
UROBILINOGEN UR STRIP-ACNC: NEGATIVE EU/DL

## 2018-04-05 LAB — BACTERIA UR CULT: NORMAL

## 2018-04-24 ENCOUNTER — NURSE TRIAGE (OUTPATIENT)
Dept: ADMINISTRATIVE | Facility: CLINIC | Age: 38
End: 2018-04-24

## 2018-04-24 ENCOUNTER — HOSPITAL ENCOUNTER (EMERGENCY)
Facility: HOSPITAL | Age: 38
Discharge: HOME | End: 2018-04-24
Attending: EMERGENCY MEDICINE
Payer: COMMERCIAL

## 2018-04-24 VITALS
HEIGHT: 64 IN | HEART RATE: 83 BPM | DIASTOLIC BLOOD PRESSURE: 69 MMHG | OXYGEN SATURATION: 97 % | TEMPERATURE: 98 F | RESPIRATION RATE: 18 BRPM | SYSTOLIC BLOOD PRESSURE: 135 MMHG | WEIGHT: 168 LBS | BODY MASS INDEX: 28.68 KG/M2

## 2018-04-24 DIAGNOSIS — R10.84 GENERALIZED ABDOMINAL PAIN: Primary | ICD-10-CM

## 2018-04-24 LAB
ALBUMIN SERPL BCP-MCNC: 3.8 G/DL
ALP SERPL-CCNC: 90 U/L
ALT SERPL W/O P-5'-P-CCNC: 9 U/L
ANION GAP SERPL CALC-SCNC: 9 MMOL/L
AST SERPL-CCNC: 11 U/L
B-HCG UR QL: NEGATIVE
BASOPHILS # BLD AUTO: 0.04 K/UL
BASOPHILS NFR BLD: 0.5 %
BILIRUB SERPL-MCNC: 1.4 MG/DL
BUN SERPL-MCNC: 10 MG/DL
CALCIUM SERPL-MCNC: 9.6 MG/DL
CHLORIDE SERPL-SCNC: 104 MMOL/L
CO2 SERPL-SCNC: 25 MMOL/L
CREAT SERPL-MCNC: 0.8 MG/DL
CTP QC/QA: YES
DIFFERENTIAL METHOD: NORMAL
EOSINOPHIL # BLD AUTO: 0.1 K/UL
EOSINOPHIL NFR BLD: 0.7 %
ERYTHROCYTE [DISTWIDTH] IN BLOOD BY AUTOMATED COUNT: 12.8 %
EST. GFR  (AFRICAN AMERICAN): >60 ML/MIN/1.73 M^2
EST. GFR  (NON AFRICAN AMERICAN): >60 ML/MIN/1.73 M^2
GLUCOSE SERPL-MCNC: 96 MG/DL
HCT VFR BLD AUTO: 39.8 %
HGB BLD-MCNC: 13.2 G/DL
IMM GRANULOCYTES # BLD AUTO: 0.03 K/UL
IMM GRANULOCYTES NFR BLD AUTO: 0.3 %
LIPASE SERPL-CCNC: 20 U/L
LYMPHOCYTES # BLD AUTO: 2.4 K/UL
LYMPHOCYTES NFR BLD: 27.1 %
MCH RBC QN AUTO: 27.7 PG
MCHC RBC AUTO-ENTMCNC: 33.2 G/DL
MCV RBC AUTO: 84 FL
MONOCYTES # BLD AUTO: 0.4 K/UL
MONOCYTES NFR BLD: 4.3 %
NEUTROPHILS # BLD AUTO: 5.8 K/UL
NEUTROPHILS NFR BLD: 67.1 %
NRBC BLD-RTO: 0 /100 WBC
PLATELET # BLD AUTO: 292 K/UL
PMV BLD AUTO: 9.5 FL
POTASSIUM SERPL-SCNC: 3.7 MMOL/L
PROT SERPL-MCNC: 7.8 G/DL
RBC # BLD AUTO: 4.76 M/UL
SODIUM SERPL-SCNC: 138 MMOL/L
WBC # BLD AUTO: 8.7 K/UL

## 2018-04-24 PROCEDURE — 85025 COMPLETE CBC W/AUTO DIFF WBC: CPT

## 2018-04-24 PROCEDURE — 99283 EMERGENCY DEPT VISIT LOW MDM: CPT | Mod: 25

## 2018-04-24 PROCEDURE — 80053 COMPREHEN METABOLIC PANEL: CPT

## 2018-04-24 PROCEDURE — 81025 URINE PREGNANCY TEST: CPT | Performed by: EMERGENCY MEDICINE

## 2018-04-24 PROCEDURE — 83690 ASSAY OF LIPASE: CPT

## 2018-04-24 PROCEDURE — 99283 EMERGENCY DEPT VISIT LOW MDM: CPT | Mod: ,,, | Performed by: EMERGENCY MEDICINE

## 2018-04-24 RX ORDER — DICYCLOMINE HYDROCHLORIDE 20 MG/1
20 TABLET ORAL EVERY 6 HOURS PRN
Qty: 20 TABLET | Refills: 0 | Status: SHIPPED | OUTPATIENT
Start: 2018-04-24 | End: 2018-05-24

## 2018-04-24 NOTE — ED TRIAGE NOTES
Presents to ER with black stool in the toilet for 2 days and 1 episode of diarrhea yesterday.  Presents a stool specimen today that is dark brown.  Has a hx of IBS.  Pt identifiers checked and correct    LOC:   · The pt is awake, alert, and aware of environment with an appropriate affect,  as well as speaking appropriately; AAOx3 to person, place, and situation  APPEARANCE:   · Pt resting comfortably and in no acute distress; clean and well groomed  SKIN:   · Skin is warm and dry; color consistent with ethnicity; pt has normal skin turgor and moist mucus membranes  · Skin intact w/ no breakdown or brusing noted  MUSCULOSKELETAL:   · Pt moving all extremities well; absent of obvious deformities; Ambulates independently  RESPIRATORY:   · Airway is open and patent; respirations are spontaneous; normal effort and rate noted; absent of accessory-muscle use  · Breath sounds auscultated in all lung fields are noted to be clear and absent of adventitious sounds  CARDIAC:   · Pt denies chest pain; normal heart sounds auscultated; Pt has no peripheral edema noted; capillary refill < 3 seconds  · 2+ pulses palpated in all extremities   ABDOMEN:   · Soft and non-tender to palpation; no abnormal distention noted/reported; bowel sounds present x 4  NEUROLOGIC:   · PERRL, 3mm bilaterally, eyes open spontaneously; pt follows commands; facial expression symmetrical; equal hand  bilaterally; purposeful motor response noted  · Normal sensation to touch reported in distal region of all extremities

## 2018-04-24 NOTE — TELEPHONE ENCOUNTER
Spoke with patients , regarding message, he states that stools were black for a few days, no nausea, no dizziness, she will keep her appointment today, and they have a stool sample.

## 2018-04-24 NOTE — ED NOTES
Discharge home with family, states understanding to follow up as directed. Ambulates out of ED without difficulty. Discharged per physician, to call for results of labs when home.

## 2018-04-24 NOTE — TELEPHONE ENCOUNTER
Reason for Disposition   Bloody, black, or tarry bowel movements    Protocols used: ST RECTAL BLEEDING-A-[place]    EC states pt has appt scheduled for later today, calling to see if he can get sooner appt. EC states that pt has black stools symptoms started 2 days, slight dizziness. Care advice given.

## 2018-04-24 NOTE — ED PROVIDER NOTES
Encounter Date: 4/24/2018    SCRIBE #1 NOTE: I, Anthony Root, am scribing for, and in the presence of,  Dr. Schulz . I have scribed the entire note.       History     Chief Complaint   Patient presents with    Melena     took pepto and wanting to get checked      Time patient was seen by the provider: 12:27 PM    The patient is a 37 y.o. female with co-morbidities including: irritable bowel syndrome who presents to the ED with a complaint of melena for 2 days after taking Pepto for abdominal pain. Pt called her PCP and was told to come to the ED because she is traveling [place] tomorrow and wanted to make sure there was no blood in her stool.      The history is provided by the patient.     Review of patient's allergies indicates:  No Known Allergies  Past Medical History:   Diagnosis Date    IBS (irritable bowel syndrome)      Past Surgical History:   Procedure Laterality Date    DILATION AND CURETTAGE OF UTERUS  2000    [facility]AB     Family History   Problem Relation Age of Onset    Hypertension Neg Hx     Colon cancer Neg Hx     Amblyopia Neg Hx     Blindness Neg Hx     Cataracts Neg Hx     Glaucoma Neg Hx     Macular degeneration Neg Hx     Retinal detachment Neg Hx     Strabismus Neg Hx     Breast cancer Neg Hx     Ovarian cancer Neg Hx      Social History   Substance Use Topics    Smoking status: Never Smoker    Smokeless tobacco: Never Used    Alcohol use No     Review of Systems   Constitutional: Negative for fever.   HENT: Negative for sore throat.    Respiratory: Negative for shortness of breath.    Cardiovascular: Negative for chest pain.   Gastrointestinal: Positive for abdominal pain. Negative for nausea.        + melena    Genitourinary: Negative for dysuria.   Musculoskeletal: Negative for back pain.   Skin: Negative for rash.   Neurological: Negative for weakness.   Hematological: Does not bruise/bleed easily.       Physical Exam     Initial Vitals [04/24/18 1203]   BP Pulse Resp  Temp SpO2   135/69 83 18 98.3 °F (36.8 °C) 97 %      MAP       91         Physical Exam    Nursing note and vitals reviewed.  Constitutional: She appears well-developed and well-nourished.   HENT:   Head: Normocephalic and atraumatic.   Eyes: Conjunctivae and EOM are normal. Pupils are equal, round, and reactive to light.   Cardiovascular: Normal rate, regular rhythm and normal heart sounds.   Abdominal: Soft. Bowel sounds are normal. There is no tenderness.   Genitourinary: Rectal exam shows guaiac negative stool. Guaiac negative stool.   Neurological: She is alert and oriented to person, place, and time.   Skin: Skin is warm and dry. No rash noted.         ED Course   Procedures  Labs Reviewed   COMPREHENSIVE METABOLIC PANEL - Abnormal; Notable for the following:        Result Value    Total Bilirubin 1.4 (*)     ALT 9 (*)     All other components within normal limits   CBC W/ AUTO DIFFERENTIAL   LIPASE   POCT URINE PREGNANCY             Medical Decision Making:   History:   Old Medical Records: I decided to obtain old medical records.  Initial Assessment:   I checked her stool and it was brown hem negative for blood. Will order blood test. She asked to be discharged and will follow up with internal medicine this afternoon. I do not believe she has a surgical abdomen.   Clinical Tests:   Lab Tests: Ordered and Reviewed            Scribe Attestation:   Scribe #1: I performed the above scribed service and the documentation accurately describes the services I performed. I attest to the accuracy of the note.               Clinical Impression:   The encounter diagnosis was Generalized abdominal pain.    Disposition:   Disposition: Discharged  Condition: Stable                        Nilo Schulz MD  04/27/18 8880

## 2018-12-06 ENCOUNTER — OFFICE VISIT (OUTPATIENT)
Dept: URGENT CARE | Facility: CLINIC | Age: 38
End: 2018-12-06
Payer: COMMERCIAL

## 2018-12-06 VITALS
HEIGHT: 64 IN | DIASTOLIC BLOOD PRESSURE: 73 MMHG | TEMPERATURE: 98 F | HEART RATE: 103 BPM | SYSTOLIC BLOOD PRESSURE: 121 MMHG | RESPIRATION RATE: 18 BRPM | BODY MASS INDEX: 28.84 KG/M2 | OXYGEN SATURATION: 96 %

## 2018-12-06 DIAGNOSIS — J30.1 SEASONAL ALLERGIC RHINITIS DUE TO POLLEN: ICD-10-CM

## 2018-12-06 DIAGNOSIS — J02.9 SORE THROAT: Primary | ICD-10-CM

## 2018-12-06 DIAGNOSIS — J06.9 URI, ACUTE: ICD-10-CM

## 2018-12-06 LAB
CTP QC/QA: YES
S PYO RRNA THROAT QL PROBE: NEGATIVE

## 2018-12-06 PROCEDURE — 3008F BODY MASS INDEX DOCD: CPT | Mod: CPTII,S$GLB,, | Performed by: FAMILY MEDICINE

## 2018-12-06 PROCEDURE — 96372 THER/PROPH/DIAG INJ SC/IM: CPT | Mod: S$GLB,,, | Performed by: FAMILY MEDICINE

## 2018-12-06 PROCEDURE — 99214 OFFICE O/P EST MOD 30 MIN: CPT | Mod: 25,S$GLB,, | Performed by: FAMILY MEDICINE

## 2018-12-06 PROCEDURE — 87880 STREP A ASSAY W/OPTIC: CPT | Mod: QW,S$GLB,, | Performed by: FAMILY MEDICINE

## 2018-12-06 RX ORDER — LORATADINE 10 MG/1
10 TABLET ORAL DAILY
Qty: 30 TABLET | Refills: 0 | Status: SHIPPED | OUTPATIENT
Start: 2018-12-06 | End: 2019-01-05

## 2018-12-06 RX ORDER — AMOXICILLIN 875 MG/1
875 TABLET, FILM COATED ORAL 2 TIMES DAILY
Qty: 20 TABLET | Refills: 0 | Status: SHIPPED | OUTPATIENT
Start: 2018-12-06 | End: 2018-12-16

## 2018-12-06 RX ORDER — BETAMETHASONE SODIUM PHOSPHATE AND BETAMETHASONE ACETATE 3; 3 MG/ML; MG/ML
6 INJECTION, SUSPENSION INTRA-ARTICULAR; INTRALESIONAL; INTRAMUSCULAR; SOFT TISSUE
Status: COMPLETED | OUTPATIENT
Start: 2018-12-06 | End: 2018-12-06

## 2018-12-06 RX ADMIN — BETAMETHASONE SODIUM PHOSPHATE AND BETAMETHASONE ACETATE 6 MG: 3; 3 INJECTION, SUSPENSION INTRA-ARTICULAR; INTRALESIONAL; INTRAMUSCULAR; SOFT TISSUE at 07:12

## 2018-12-07 NOTE — PROGRESS NOTES
"Subjective:       Patient ID: Venkatesh Pritchett is a 38 y.o. female.    Vitals:  height is 5' 4" (1.626 m). Her temperature is 98.3 °F (36.8 °C). Her blood pressure is 121/73 and her pulse is 103. Her respiration is 18 and oxygen saturation is 96%.     Chief Complaint: Sore Throat    39 y/o female with c/o sore throat, post nasal drip and earache, just returned from [place] visit, feels fundown  No fever      Sore Throat    This is a new problem. The current episode started yesterday. The problem has been gradually worsening. The pain is worse on the right side. There has been no fever. The pain is at a severity of 7/10. The pain is moderate. Associated symptoms include ear pain, swollen glands and trouble swallowing. Pertinent negatives include no congestion, coughing, shortness of breath, stridor or vomiting. She has tried nothing for the symptoms. The treatment provided no relief.       Constitution: Negative for chills, sweating, fatigue and fever.   HENT: Positive for ear pain, sore throat and trouble swallowing. Negative for congestion, sinus pain, sinus pressure and voice change.    Neck: Negative for painful lymph nodes.   Eyes: Negative for eye redness.   Respiratory: Negative for chest tightness, cough, sputum production, bloody sputum, COPD, shortness of breath, stridor, wheezing and asthma.    Gastrointestinal: Negative for nausea and vomiting.   Musculoskeletal: Negative for muscle ache.   Skin: Negative for rash.   Allergic/Immunologic: Negative for seasonal allergies and asthma.   Hematologic/Lymphatic: Negative for swollen lymph nodes.       Objective:      Physical Exam   Constitutional: She is oriented to person, place, and time. She appears well-developed and well-nourished. She is cooperative.  Non-toxic appearance. She does not appear ill. No distress.   HENT:   Head: Normocephalic and atraumatic.   Right Ear: Hearing, tympanic membrane, external ear and ear canal normal.   Left Ear: Hearing, " tympanic membrane, external ear and ear canal normal.   Nose: Nose normal. No mucosal edema, rhinorrhea or nasal deformity. No epistaxis. Right sinus exhibits no maxillary sinus tenderness and no frontal sinus tenderness. Left sinus exhibits no maxillary sinus tenderness and no frontal sinus tenderness.   Mouth/Throat: Uvula is midline, oropharynx is clear and moist and mucous membranes are normal. No trismus in the jaw. Normal dentition. No uvula swelling. No oropharyngeal exudate or posterior oropharyngeal erythema.   Eyes: Conjunctivae and lids are normal. Right eye exhibits no discharge. Left eye exhibits no discharge. No scleral icterus.   Sclera clear bilat   Neck: Trachea normal, normal range of motion, full passive range of motion without pain and phonation normal. Neck supple. No JVD present. No tracheal deviation present.   Cardiovascular: Normal rate, regular rhythm, normal heart sounds, intact distal pulses and normal pulses. Exam reveals no friction rub.   No murmur heard.  Pulmonary/Chest: Effort normal and breath sounds normal. No stridor. She has no wheezes.   Abdominal: Soft. Normal appearance and bowel sounds are normal. She exhibits no distension, no pulsatile midline mass and no mass. There is no tenderness.   Musculoskeletal: Normal range of motion. She exhibits no edema or deformity.   Lymphadenopathy:     She has no cervical adenopathy.   Neurological: She is alert and oriented to person, place, and time. She exhibits normal muscle tone. Coordination normal.   Skin: Skin is warm, dry and intact. She is not diaphoretic. No pallor.   Psychiatric: She has a normal mood and affect. Her speech is normal and behavior is normal. Judgment and thought content normal. Cognition and memory are normal.   Nursing note and vitals reviewed.      Assessment:       1. Sore throat    2. Seasonal allergic rhinitis due to pollen    3. URI, acute        Plan:         Sore throat  -     POCT rapid strep  A    Seasonal allergic rhinitis due to pollen    URI, acute    Other orders  -     betamethasone acetate-betamethasone sodium phosphate injection 6 mg  -     loratadine (CLARITIN) 10 mg tablet; Take 1 tablet (10 mg total) by mouth once daily.  Dispense: 30 tablet; Refill: 0  -     amoxicillin (AMOXIL) 875 MG tablet; Take 1 tablet (875 mg total) by mouth 2 (two) times daily. for 10 days  Dispense: 20 tablet; Refill: 0     Claritin one daily  mucinex dm one bid  Tylenol alternate with motrin 2 every 6 hours  Return to clinic if Symptoms worsens

## 2018-12-14 ENCOUNTER — OFFICE VISIT (OUTPATIENT)
Dept: NEUROLOGY | Facility: CLINIC | Age: 38
End: 2018-12-14
Payer: COMMERCIAL

## 2018-12-14 VITALS
HEIGHT: 64 IN | WEIGHT: 163.13 LBS | HEART RATE: 83 BPM | DIASTOLIC BLOOD PRESSURE: 71 MMHG | BODY MASS INDEX: 27.85 KG/M2 | SYSTOLIC BLOOD PRESSURE: 99 MMHG

## 2018-12-14 DIAGNOSIS — M79.18 MYOFASCIAL PAIN: ICD-10-CM

## 2018-12-14 DIAGNOSIS — G43.009 MIGRAINE WITHOUT AURA AND WITHOUT STATUS MIGRAINOSUS, NOT INTRACTABLE: Primary | ICD-10-CM

## 2018-12-14 PROCEDURE — 3008F BODY MASS INDEX DOCD: CPT | Mod: CPTII,S$GLB,, | Performed by: NURSE PRACTITIONER

## 2018-12-14 PROCEDURE — 99214 OFFICE O/P EST MOD 30 MIN: CPT | Mod: S$GLB,,, | Performed by: NURSE PRACTITIONER

## 2018-12-14 PROCEDURE — 99999 PR PBB SHADOW E&M-EST. PATIENT-LVL III: CPT | Mod: PBBFAC,,, | Performed by: NURSE PRACTITIONER

## 2018-12-14 RX ORDER — DICLOFENAC SODIUM 75 MG/1
75 TABLET, DELAYED RELEASE ORAL 2 TIMES DAILY PRN
Qty: 40 TABLET | Refills: 2 | Status: SHIPPED | OUTPATIENT
Start: 2018-12-14 | End: 2020-09-03

## 2018-12-14 RX ORDER — TIZANIDINE 2 MG/1
TABLET ORAL
Qty: 60 TABLET | Refills: 11 | Status: SHIPPED | OUTPATIENT
Start: 2018-12-14 | End: 2020-11-09

## 2018-12-14 RX ORDER — AMITRIPTYLINE HYDROCHLORIDE 10 MG/1
10 TABLET, FILM COATED ORAL NIGHTLY
Qty: 30 TABLET | Refills: 11 | Status: SHIPPED | OUTPATIENT
Start: 2018-12-14 | End: 2020-03-04 | Stop reason: SDUPTHER

## 2018-12-14 NOTE — PROGRESS NOTES
"Established Patient   SUBJECTIVE:  Patient ID: Venkatesh Pritchett   Chief Complaint: Follow-up    History of Present Illness:  Venkatesh Pritchett is a 38 y.o. female who presents to clinic with her  for follow-up of headaches.     Recommendations made at last Office Visit on 3/2/2018:  - Lengthy discussion regarding headaches, as headaches are similar to the headaches she has always experienced and neuro exam is unremarkable, I do not feel repeat imaging is needed at this time, but offered to order MRI Brain to give her peace of mind, which she deferred at this time.  Multiple lifestyle factors likely contributing to headaches including anxiety, poor sleep hygiene, being homesick, poor diet, lack of exercise, lack of support system in [place] and weight gain.    - CBC, CMP, Vitamin D, TSH, and free T4 ordered - has had low vitamin D level in past, not currently taking a supplements   - To help with headache prevention, sleep, and anxiety - start Elavil 10 mg  - Will also start Zanaflex 2 mg nightly to help with neck stiffness and sleep   - For acute headaches - given diclofenac 75 mg tabs to be taken every 8-10 hours as needed for headaches   - Sleep hygiene tips provided   - Strongly advised she begin tracking her headaches as she is describing migrainous and non-migrainous features, discussed diligent tracking can help narrow down the type of headache she is suffering from   - Recommended she make an effort to create some "me time" each day, also encouraged her to follow a healthy diet and begin exercising routinely   - RTC in 1 month or sooner if needed      12/14/2018 - Interval History:  Headaches have been occurring once per week and last anywhere from a few hours to all day in duration.  She was taking Elavil and Tizanidine for headache and migraine prevention, which was effective, however she has not had any medication for the last few months as she was in [place] visiting family for the last 6-7 months.  She would " like to restart her previous medications.  Headaches and migraines continue to feel the same as they have always felt, she denies any change in the quality or nature of her migraines.     History of Present Illness:   37 y.o. female with headaches, allergies, IBS, anxiety, depression, and trouble sleeping, who presents to clinic with her  for evaluation of headaches.  History is provided by both the patient and her .     Headaches initially began in her early thirties and have persisted since.  Headaches are mainly on the left side above her eye and in the parietal region, described as pounding, pressure, but can also be stabbing pains.  Pain waxes and wanes throughout the day, though she reports when she is busy she is not as aware of it as when she is thinking about it. Onset is abrupt. Pain can be rated anywhere from 3-9 out of 10, reports pain becomes severe 3 days per week.  Headaches last between 6 hours up to all day, have experienced headaches which lasted 6 days in the past.  Associated symptoms include photo/phonophobia (with severe headaches) and some dizziness. Worsened by movement/activity.   Headaches have been occurring daily since October.  She reports having gone back home with her son from August to October, she was experiencing headaches while in [place], but they have become far more frequent since she returned.  Both the patient and her  she has been very home sick since coming back to the [place], she adds it is very difficult to raise a young child without any help here, her  works long hours and she has no family here to help her and feels overwhelmed.  While in [place], she was seen by her Neurologist there who did not feel headaches were coming from a secondary source, though she continues to have concerns there is something wrong with her which is causing the headaches. In the past, she was started on Amitriptyline, which was effective for migraine prevention.  She has  "had CT Scan of her head before in the past which was reportedly normal.  She does admit the headache she is currently experiencing are similar to headaches she has always had, just more frequent and more intense.    She secondarily has been experiencing bouts of dizziness, which she describes as feeling as though the room is spinning.  She reports these symptoms can occur with or without the headaches.  Only occurs when she goes from a sitting to standing position.    Associated Symptoms - photo/phonophobia   Triggers - anxiety, poor sleep   Aggravating Factors - anxiety   Alleviating Factors - sleep, relaxing   Head Trauma? Infection? Fever? Cancer? Pregnancy? <-- denies   Recent Changes - returned home from [place], weight gain    Sleep - no trouble falling asleep, but wakes up every 2-3 hours, she does not feel rested in the morning   Positives in bold: Hx of Kidney Stones, asthma, GI bleed, osteoporosis, CAD/MI, CVA/TIA, DM <-- denies      Treatments Tried and Response  Fioricet - no    Elavil - helped in past, re-prescribed today   Diclofenac - given today   Tizanidine - given today, helped in the past     Current Medications:    amitriptyline (ELAVIL) 10 MG tablet, Take 1 tablet (10 mg total) by mouth every evening., Disp: 30 tablet, Rfl: 11    diclofenac (VOLTAREN) 75 MG EC tablet, Take 1 tablet (75 mg total) by mouth 2 (two) times daily as needed., Disp: 40 tablet, Rfl: 2    loratadine (CLARITIN) 10 mg tablet, Take 1 tablet (10 mg total) by mouth once daily., Disp: 30 tablet, Rfl: 0    tiZANidine (ZANAFLEX) 2 MG tablet, Take 1-2 tabs around bedtime. No alcohol, no driving with medication., Disp: 60 tablet, Rfl: 11    Review of Systems - as per HPI, otherwise a balanced 10 systems review is negative.    OBJECTIVE:  Vitals:  BP 99/71   Pulse 83   Ht 5' 4" (1.626 m)   Wt 74 kg (163 lb 2.3 oz)   BMI 28.00 kg/m²      Physical Exam:  Constitutional: she appears well-developed and well-nourished. she is well " groomed. NAD   HENT:    Head: Normocephalic and atraumatic  Eyes: Conjunctivae and EOM are normal  Musculoskeletal: Normal range of motion. No joint stiffness.   Skin: Skin is warm and dry.  Psychiatric: Mood and affect are normal    Neuro: Patient is alert and oriented to person, place, and time. Language is intact and fluent.  Recent and remote memory are intact.  Normal attention and concentration.  Facial movement is symmetric.  Gait is normal.     ASSESSMENT:  1. Migraine without aura and without status migrainosus, not intractable    2. Myofascial pain      PLAN:  - restart Elavil 10 mg nightly + Tizanidine 2-4 mg nightly for headache and migraine prevention   - For acute headaches - given diclofenac 75 mg tabs, may take 1 tab every 10-12 hours as needed for headaches   - Continue tracking headaches   - Discussed goals of therapy are to decrease the frequency, intensity, and duration of headaches  - RTC in 3 months or sooner if needed      Orders Placed This Encounter    amitriptyline (ELAVIL) 10 MG tablet    tiZANidine (ZANAFLEX) 2 MG tablet    diclofenac (VOLTAREN) 75 MG EC tablet     Counseling:  I spent 25 minutes face-to-face with the patient with > 50% of the time spent counseling and educating regarding the results of the data, diagnosis, and recommendations stated above, the risks, benefits, and potential side effects of the medications, and the future plan of care.  Questions and concerns were sought and answered to the patient's stated verbal satisfaction.  The patient verbalizes understanding and agreement with the above stated treatment plan.     CC: MD Ramila Anderson, FNP-C  [facility]   [phone]    Dr. Sherman was available during today's encounter.

## 2018-12-31 ENCOUNTER — OFFICE VISIT (OUTPATIENT)
Dept: OPHTHALMOLOGY | Facility: CLINIC | Age: 38
End: 2018-12-31
Payer: COMMERCIAL

## 2018-12-31 DIAGNOSIS — H04.123 INSUFFICIENCY OF TEAR FILM OF BOTH EYES: ICD-10-CM

## 2018-12-31 PROCEDURE — 99999 PR PBB SHADOW E&M-EST. PATIENT-LVL II: CPT | Mod: PBBFAC,,, | Performed by: OPHTHALMOLOGY

## 2018-12-31 PROCEDURE — 92004 COMPRE OPH EXAM NEW PT 1/>: CPT | Mod: S$GLB,,, | Performed by: OPHTHALMOLOGY

## 2018-12-31 NOTE — PATIENT INSTRUCTIONS
Use artificial tears in both eyes four times a day.  Lubricating ointment or gel at bedtime in both eyes.  Return in two to three weeks if this treatment is not working for you.

## 2019-01-03 ENCOUNTER — OFFICE VISIT (OUTPATIENT)
Dept: INTERNAL MEDICINE | Facility: CLINIC | Age: 39
End: 2019-01-03

## 2019-01-03 VITALS
WEIGHT: 165.38 LBS | HEIGHT: 64 IN | DIASTOLIC BLOOD PRESSURE: 60 MMHG | OXYGEN SATURATION: 99 % | HEART RATE: 68 BPM | BODY MASS INDEX: 28.24 KG/M2 | SYSTOLIC BLOOD PRESSURE: 101 MMHG | TEMPERATURE: 98 F

## 2019-01-03 DIAGNOSIS — Z00.00 WELLNESS EXAMINATION: Primary | ICD-10-CM

## 2019-01-03 PROCEDURE — 99999 PR PBB SHADOW E&M-EST. PATIENT-LVL IV: ICD-10-PCS | Mod: PBBFAC,,, | Performed by: INTERNAL MEDICINE

## 2019-01-03 PROCEDURE — 93010 EKG 12-LEAD: ICD-10-PCS | Mod: S$PBB,,, | Performed by: INTERNAL MEDICINE

## 2019-01-03 PROCEDURE — 93005 ELECTROCARDIOGRAM TRACING: CPT | Mod: PBBFAC,PO | Performed by: INTERNAL MEDICINE

## 2019-01-03 PROCEDURE — 99396 PR PREVENTIVE VISIT,EST,40-64: ICD-10-PCS | Mod: S$PBB,,, | Performed by: INTERNAL MEDICINE

## 2019-01-03 PROCEDURE — 99999 PR PBB SHADOW E&M-EST. PATIENT-LVL IV: CPT | Mod: PBBFAC,,, | Performed by: INTERNAL MEDICINE

## 2019-01-03 PROCEDURE — 93010 ELECTROCARDIOGRAM REPORT: CPT | Mod: S$PBB,,, | Performed by: INTERNAL MEDICINE

## 2019-01-03 PROCEDURE — 99396 PREV VISIT EST AGE 40-64: CPT | Mod: S$PBB,,, | Performed by: INTERNAL MEDICINE

## 2019-01-04 ENCOUNTER — LAB VISIT (OUTPATIENT)
Dept: LAB | Facility: HOSPITAL | Age: 39
End: 2019-01-04
Attending: INTERNAL MEDICINE

## 2019-01-04 DIAGNOSIS — Z00.00 WELLNESS EXAMINATION: ICD-10-CM

## 2019-01-04 LAB
25(OH)D3+25(OH)D2 SERPL-MCNC: 12 NG/ML
ALBUMIN SERPL BCP-MCNC: 3.6 G/DL
ALP SERPL-CCNC: 84 U/L
ALT SERPL W/O P-5'-P-CCNC: 8 U/L
ANION GAP SERPL CALC-SCNC: 8 MMOL/L
AST SERPL-CCNC: 11 U/L
BASOPHILS # BLD AUTO: 0.04 K/UL
BASOPHILS NFR BLD: 0.5 %
BILIRUB SERPL-MCNC: 0.9 MG/DL
BUN SERPL-MCNC: 9 MG/DL
CALCIUM SERPL-MCNC: 9.5 MG/DL
CCP AB SER IA-ACNC: 0.5 U/ML
CHLORIDE SERPL-SCNC: 103 MMOL/L
CHOLEST SERPL-MCNC: 172 MG/DL
CHOLEST/HDLC SERPL: 2.5 {RATIO}
CO2 SERPL-SCNC: 26 MMOL/L
CREAT SERPL-MCNC: 0.8 MG/DL
CRP SERPL-MCNC: 15.4 MG/L
DIFFERENTIAL METHOD: ABNORMAL
EOSINOPHIL # BLD AUTO: 0.2 K/UL
EOSINOPHIL NFR BLD: 2 %
ERYTHROCYTE [DISTWIDTH] IN BLOOD BY AUTOMATED COUNT: 13.1 %
ERYTHROCYTE [SEDIMENTATION RATE] IN BLOOD BY WESTERGREN METHOD: 26 MM/HR
EST. GFR  (AFRICAN AMERICAN): >60 ML/MIN/1.73 M^2
EST. GFR  (NON AFRICAN AMERICAN): >60 ML/MIN/1.73 M^2
ESTIMATED AVG GLUCOSE: 97 MG/DL
GLUCOSE SERPL-MCNC: 90 MG/DL
HBA1C MFR BLD HPLC: 5 %
HCT VFR BLD AUTO: 40.4 %
HDLC SERPL-MCNC: 69 MG/DL
HDLC SERPL: 40.1 %
HGB BLD-MCNC: 12.8 G/DL
IMM GRANULOCYTES # BLD AUTO: 0.02 K/UL
IMM GRANULOCYTES NFR BLD AUTO: 0.3 %
LDLC SERPL CALC-MCNC: 85.8 MG/DL
LYMPHOCYTES # BLD AUTO: 2.7 K/UL
LYMPHOCYTES NFR BLD: 33.7 %
MCH RBC QN AUTO: 27.2 PG
MCHC RBC AUTO-ENTMCNC: 31.7 G/DL
MCV RBC AUTO: 86 FL
MONOCYTES # BLD AUTO: 0.4 K/UL
MONOCYTES NFR BLD: 4.5 %
NEUTROPHILS # BLD AUTO: 4.7 K/UL
NEUTROPHILS NFR BLD: 59 %
NONHDLC SERPL-MCNC: 103 MG/DL
NRBC BLD-RTO: 0 /100 WBC
PLATELET # BLD AUTO: 344 K/UL
PMV BLD AUTO: 9.7 FL
POTASSIUM SERPL-SCNC: 3.8 MMOL/L
PROT SERPL-MCNC: 7.7 G/DL
RBC # BLD AUTO: 4.71 M/UL
RHEUMATOID FACT SERPL-ACNC: <10 IU/ML
SODIUM SERPL-SCNC: 137 MMOL/L
TRIGL SERPL-MCNC: 86 MG/DL
TSH SERPL DL<=0.005 MIU/L-ACNC: 1.32 UIU/ML
WBC # BLD AUTO: 7.92 K/UL

## 2019-01-04 PROCEDURE — 85652 RBC SED RATE AUTOMATED: CPT

## 2019-01-04 PROCEDURE — 80061 LIPID PANEL: CPT

## 2019-01-04 PROCEDURE — 86200 CCP ANTIBODY: CPT

## 2019-01-04 PROCEDURE — 80053 COMPREHEN METABOLIC PANEL: CPT

## 2019-01-04 PROCEDURE — 85025 COMPLETE CBC W/AUTO DIFF WBC: CPT

## 2019-01-04 PROCEDURE — 36415 COLL VENOUS BLD VENIPUNCTURE: CPT | Mod: PO

## 2019-01-04 PROCEDURE — 86038 ANTINUCLEAR ANTIBODIES: CPT

## 2019-01-04 PROCEDURE — 83036 HEMOGLOBIN GLYCOSYLATED A1C: CPT

## 2019-01-04 PROCEDURE — 86140 C-REACTIVE PROTEIN: CPT

## 2019-01-04 PROCEDURE — 84443 ASSAY THYROID STIM HORMONE: CPT

## 2019-01-04 PROCEDURE — 82306 VITAMIN D 25 HYDROXY: CPT

## 2019-01-04 PROCEDURE — 86431 RHEUMATOID FACTOR QUANT: CPT

## 2019-01-06 NOTE — PROGRESS NOTES
Subjective:       Patient ID: Venkatesh Pritchett is a 38 y.o. female.    Chief Complaint: Establish Care    HPIPt is not feeling well.  No CP or SOB.  She does have some palpitations. She has some stomach upset but better if she does not use dairy but she likes cheese.  Has some joint pain especially hips.    Review of Systems   HENT: Positive for congestion.    Respiratory: Negative for shortness of breath (PND or orthopnea).    Cardiovascular: Positive for palpitations. Negative for chest pain (arm pain or jaw pain).   Gastrointestinal: Positive for abdominal pain. Negative for diarrhea, nausea and vomiting.   Genitourinary: Negative for dysuria.   Neurological: Positive for headaches. Negative for seizures and syncope.       Objective:      Physical Exam   Constitutional: She is oriented to person, place, and time. She appears well-developed and well-nourished. No distress.   HENT:   Head: Normocephalic.   Mouth/Throat: Oropharynx is clear and moist.   Neck: Neck supple. No JVD present. No thyromegaly present.   Cardiovascular: Normal rate, regular rhythm, normal heart sounds and intact distal pulses. Exam reveals no gallop and no friction rub.   No murmur heard.  Pulmonary/Chest: Effort normal and breath sounds normal. She has no wheezes. She has no rales.   Abdominal: Soft. Bowel sounds are normal. She exhibits no distension and no mass. There is no tenderness. There is no rebound and no guarding.   Musculoskeletal: She exhibits no edema.   Tender over both trochanteric bursa   Lymphadenopathy:     She has no cervical adenopathy.   Neurological: She is alert and oriented to person, place, and time. She has normal reflexes.   Skin: Skin is warm and dry.   Psychiatric: She has a normal mood and affect. Her behavior is normal. Judgment and thought content normal.       Assessment:       1. Wellness examination        Plan:   Wellness examination  -     CBC auto differential; Future; Expected date: 01/03/2019  -      Comprehensive metabolic panel; Future; Expected date: 01/03/2019  -     Lipid panel; Future; Expected date: 01/03/2019  -     TSH; Future; Expected date: 01/03/2019  -     Hemoglobin A1c; Future; Expected date: 01/03/2019  -     Vitamin D; Future; Expected date: 01/03/2019  -     Sedimentation rate; Future; Expected date: 01/03/2019  -     C-reactive protein; Future; Expected date: 01/03/2019  -     GIBRAN Screen w/Reflex; Future; Expected date: 01/03/2019  -     Rheumatoid factor; Future; Expected date: 01/03/2019  -     Cyclic citrul peptide antibody, IgG; Future; Expected date: 01/03/2019  -     CT Sinuses without Contrast; Future; Expected date: 01/03/2019  -     EKG 12-lead    Try aleve BID for trochanteric bursitis  Declines injection

## 2019-01-07 ENCOUNTER — PATIENT MESSAGE (OUTPATIENT)
Dept: INTERNAL MEDICINE | Facility: CLINIC | Age: 39
End: 2019-01-07

## 2019-01-07 ENCOUNTER — TELEPHONE (OUTPATIENT)
Dept: INTERNAL MEDICINE | Facility: CLINIC | Age: 39
End: 2019-01-07

## 2019-01-07 LAB — ANA SER QL IF: NORMAL

## 2019-01-07 NOTE — TELEPHONE ENCOUNTER
----- Message from Elif Shahid sent at 1/7/2019  4:15 PM CST -----  Contact: [phone]  Patient would like to get test results    Name of test (lab, mammo, etc.):   Lab work    Date of test:  1/4    Ordering provider: MD     Where was the test performed:  [facility]    Would you prefer a response via [facility]?:  No     Comments:  Please advise,thanks

## 2019-01-14 ENCOUNTER — TELEPHONE (OUTPATIENT)
Dept: INTERNAL MEDICINE | Facility: CLINIC | Age: 39
End: 2019-01-14

## 2019-01-14 DIAGNOSIS — Z12.39 BREAST CANCER SCREENING: ICD-10-CM

## 2019-01-14 NOTE — TELEPHONE ENCOUNTER
----- Message from Kye Bauman sent at 1/14/2019 10:30 AM CST -----  Contact: Patient [phone]  Patient advice    She said her vitamin D is low according to her lab results so she wants to know if you want her to take a vitamin D supplement.     Thank you

## 2019-10-07 ENCOUNTER — OFFICE VISIT (OUTPATIENT)
Dept: URGENT CARE | Facility: CLINIC | Age: 39
End: 2019-10-07

## 2019-10-07 VITALS
OXYGEN SATURATION: 96 % | BODY MASS INDEX: 28.17 KG/M2 | TEMPERATURE: 102 F | RESPIRATION RATE: 18 BRPM | DIASTOLIC BLOOD PRESSURE: 62 MMHG | HEART RATE: 124 BPM | WEIGHT: 165 LBS | HEIGHT: 64 IN | SYSTOLIC BLOOD PRESSURE: 106 MMHG

## 2019-10-07 DIAGNOSIS — B34.9 VIRAL SYNDROME: Primary | ICD-10-CM

## 2019-10-07 DIAGNOSIS — R10.84 GENERALIZED ABDOMINAL PAIN: ICD-10-CM

## 2019-10-07 LAB
CTP QC/QA: YES
S PYO RRNA THROAT QL PROBE: NEGATIVE

## 2019-10-07 PROCEDURE — 99214 PR OFFICE/OUTPT VISIT, EST, LEVL IV, 30-39 MIN: ICD-10-PCS | Mod: 25,S$GLB,, | Performed by: NURSE PRACTITIONER

## 2019-10-07 PROCEDURE — 99214 OFFICE O/P EST MOD 30 MIN: CPT | Mod: 25,S$GLB,, | Performed by: NURSE PRACTITIONER

## 2019-10-07 PROCEDURE — 87880 STREP A ASSAY W/OPTIC: CPT | Mod: QW,S$GLB,, | Performed by: NURSE PRACTITIONER

## 2019-10-07 PROCEDURE — 87880 POCT RAPID STREP A: ICD-10-PCS | Mod: QW,S$GLB,, | Performed by: NURSE PRACTITIONER

## 2019-10-07 PROCEDURE — 96372 PR INJECTION,THERAP/PROPH/DIAG2ST, IM OR SUBCUT: ICD-10-PCS | Mod: S$GLB,,, | Performed by: FAMILY MEDICINE

## 2019-10-07 PROCEDURE — 96372 THER/PROPH/DIAG INJ SC/IM: CPT | Mod: S$GLB,,, | Performed by: FAMILY MEDICINE

## 2019-10-07 RX ORDER — DICYCLOMINE HYDROCHLORIDE 10 MG/1
10 CAPSULE ORAL 4 TIMES DAILY
Qty: 28 CAPSULE | Refills: 0 | Status: SHIPPED | OUTPATIENT
Start: 2019-10-07 | End: 2019-10-14

## 2019-10-07 RX ORDER — DICYCLOMINE HYDROCHLORIDE 10 MG/1
10 CAPSULE ORAL 4 TIMES DAILY
Qty: 28 CAPSULE | Refills: 0 | Status: SHIPPED | OUTPATIENT
Start: 2019-10-07 | End: 2019-10-07

## 2019-10-07 RX ORDER — ACETAMINOPHEN 500 MG
1000 TABLET ORAL
Status: COMPLETED | OUTPATIENT
Start: 2019-10-07 | End: 2019-10-07

## 2019-10-07 RX ORDER — BETAMETHASONE SODIUM PHOSPHATE AND BETAMETHASONE ACETATE 3; 3 MG/ML; MG/ML
6 INJECTION, SUSPENSION INTRA-ARTICULAR; INTRALESIONAL; INTRAMUSCULAR; SOFT TISSUE
Status: COMPLETED | OUTPATIENT
Start: 2019-10-07 | End: 2019-10-07

## 2019-10-07 RX ADMIN — BETAMETHASONE SODIUM PHOSPHATE AND BETAMETHASONE ACETATE 6 MG: 3; 3 INJECTION, SUSPENSION INTRA-ARTICULAR; INTRALESIONAL; INTRAMUSCULAR; SOFT TISSUE at 07:10

## 2019-10-07 RX ADMIN — Medication 1000 MG: at 07:10

## 2019-10-07 NOTE — PROGRESS NOTES
"Subjective:       Patient ID: Venkatesh Pritchett is a 39 y.o. female.    Vitals:  height is 5' 4" (1.626 m) and weight is 74.8 kg (165 lb). Her temperature is 102.3 °F (39.1 °C) (abnormal). Her blood pressure is 106/62 and her pulse is 124 (abnormal). Her respiration is 18 and oxygen saturation is 96%.     Chief Complaint: Sore Throat    Sore Throat    This is a new problem. The current episode started yesterday. The problem has been gradually worsening. The maximum temperature recorded prior to her arrival was 102 - 102.9 F. The pain is at a severity of 3/10. Associated symptoms include abdominal pain. Pertinent negatives include no congestion, coughing, ear pain, shortness of breath, stridor or vomiting. She has had no exposure to strep or mono. She has tried nothing for the symptoms.       Constitution: Positive for fever. Negative for chills, sweating and fatigue.   HENT: Positive for sore throat. Negative for ear pain, congestion, sinus pain, sinus pressure and voice change.    Neck: Negative for painful lymph nodes.   Eyes: Negative for eye redness.   Respiratory: Negative for chest tightness, cough, sputum production, bloody sputum, COPD, shortness of breath, stridor, wheezing and asthma.    Gastrointestinal: Positive for abdominal pain. Negative for nausea and vomiting.   Musculoskeletal: Negative for muscle ache.   Skin: Negative for rash.   Allergic/Immunologic: Negative for seasonal allergies and asthma.   Hematologic/Lymphatic: Negative for swollen lymph nodes.       Objective:      Physical Exam   Constitutional: She is oriented to person, place, and time. Vital signs are normal. She appears well-developed and well-nourished. She is active and cooperative.  Non-toxic appearance. She does not have a sickly appearance. She does not appear ill. No distress.   HENT:   Head: Normocephalic and atraumatic.   Right Ear: Hearing, tympanic membrane, external ear and ear canal normal.   Left Ear: Hearing, tympanic " membrane, external ear and ear canal normal.   Nose: Nose normal. No mucosal edema, rhinorrhea or nasal deformity. No epistaxis. Right sinus exhibits no maxillary sinus tenderness and no frontal sinus tenderness. Left sinus exhibits no maxillary sinus tenderness and no frontal sinus tenderness.   Mouth/Throat: Uvula is midline and mucous membranes are normal. No trismus in the jaw. Normal dentition. No uvula swelling. Posterior oropharyngeal erythema (mild) present. No oropharyngeal exudate, posterior oropharyngeal edema or tonsillar abscesses. No tonsillar exudate.   Eyes: Pupils are equal, round, and reactive to light. Conjunctivae, EOM and lids are normal. No scleral icterus.   Sclera clear bilat   Neck: Trachea normal, full passive range of motion without pain and phonation normal. Neck supple.   Cardiovascular: Normal rate, regular rhythm, normal heart sounds and intact distal pulses.   Pulses:       Radial pulses are 2+ on the right side, and 2+ on the left side.   Pulmonary/Chest: Effort normal and breath sounds normal. No respiratory distress.   Abdominal: Soft. Normal appearance. There is generalized tenderness (mild. Patient describes pain as cramping sensation. ). There is no rigidity, no rebound, no guarding, no CVA tenderness, no tenderness at McBurney's point and negative Palmer's sign.   Musculoskeletal: Normal range of motion. She exhibits no edema or deformity.   Neurological: She is alert and oriented to person, place, and time. She has normal strength. Gait normal.   Skin: Skin is warm, dry and intact. Capillary refill takes less than 2 seconds. No rash noted. She is not diaphoretic. No pallor.   Psychiatric: She has a normal mood and affect. Her speech is normal and behavior is normal. Judgment and thought content normal. Cognition and memory are normal.   Nursing note and vitals reviewed.      Assessment:       1. Viral syndrome    2. Generalized abdominal pain        Results for orders placed  or performed in visit on 10/07/19   POCT rapid strep A   Result Value Ref Range    Rapid Strep A Screen Negative Negative     Acceptable Yes      Flu Negative  Plan:       Presenting to the Urgent Care for generalized abdominal pain associated with non-bloody diarrhea. Denies significant weight loss, recent hospitalization or use of antibiotics, or symptoms lasting greater than a week. Endorses sick contacts with similar symptoms. Son recently diagnosed with viral syndrome and similar symptoms. Vitals reassuring. Patient is non-toxic appearing and in no acute distress. Abdomen soft, but has mild diffuse TTP.     Moist mucus membranes- no evidence of volume depletion or dehydration.      Given rapid onset and characteristics of this patient's spectrum of symptoms, short duration of symptoms, and benign abdominal exam, patient likely has a variety of viral gastroenteritis. I do not feel there is indication for stool studies to assess for bacterial etiology at this time.  No strong indication for imaging of abdomen at this time. Given the above, I have also considered but doubt C. difficile, IBD, infectious colitis, DKA, SBO, pancreatitis, acute cholecystitis, and appendicitis. Marijuana denies.     Strict ER precautions discussed.     Viral syndrome  -     POCT rapid strep A  -     betamethasone acetate-betamethasone sodium phosphate injection 6 mg    Generalized abdominal pain  -     acetaminophen tablet 1,000 mg  -     Discontinue: dicyclomine (BENTYL) 10 MG capsule; Take 1 capsule (10 mg total) by mouth 4 (four) times daily. for 7 days  Dispense: 28 capsule; Refill: 0  -     dicyclomine (BENTYL) 10 MG capsule; Take 1 capsule (10 mg total) by mouth 4 (four) times daily. for 7 days  Dispense: 28 capsule; Refill: 0      Patient Instructions     If you were prescribed a narcotic or controlled medication, do not drive or operate heavy equipment or machinery while taking these medications.  You must  "understand that you've received an Urgent Care treatment only and that you may be released before all your medical problems are known or treated. You, the patient, will arrange for follow up care as instructed.  Follow up with your PCP or specialty clinic as directed within 2-5 days if not improved or as needed.  You can call [phone] to schedule an appointment with the appropriate provider.  If your condition worsens we recommend that you receive another evaluation at the emergency room immediately or contact your primary medical clinics after hours call service to discuss your concerns.  Please return here or go to the Emergency Department for any concerns or worsening of condition.      Viral Syndrome (Adult)  A viral illness may cause a number of symptoms. The symptoms depend on the part of the body that the virus affects. If it settles in your nose, throat, and lungs, it may cause cough, sore throat, congestion, and sometimes headache. If it settles in your stomach and intestinal tract, it may cause vomiting and diarrhea. Sometimes it causes vague symptoms like "aching all over," feeling tired, loss of appetite, or fever.  A viral illness usually lasts 1 to 2 weeks, but sometimes it lasts longer. In some cases, a more serious infection can look like a viral syndrome in the first few days of the illness. You may need another exam and additional tests to know the difference. Watch for the warning signs listed below.  Home care  Follow these guidelines for taking care of yourself at home:  · If symptoms are severe, rest at home for the first 2 to 3 days.  · Stay away from cigarette smoke - both your smoke and the smoke from others.  · You may use over-the-counter acetaminophen or ibuprofen for fever, muscle aching, and headache, unless another medicine was prescribed for this. If you have chronic liver or kidney disease or ever had a stomach ulcer or GI bleeding, talk with your doctor before using these " medicines. No one who is younger than 18 and ill with a fever should take aspirin. It may cause severe disease or death.  · Your appetite may be poor, so a light diet is fine. Avoid dehydration by drinking 8 to 12 8-ounce glasses of fluids each day. This may include water; orange juice; lemonade; apple, grape, and cranberry juice; clear fruit drinks; electrolyte replacement and sports drinks; and decaffeinated teas and coffee. If you have been diagnosed with a kidney disease, ask your doctor how much and what types of fluids you should drink to prevent dehydration. If you have kidney disease, drinking too much fluid can cause it build up in the your body and be dangerous to your health.  · Over-the-counter remedies won't shorten the length of the illness but may be helpful for cough, sore throat; and nasal and sinus congestion. Don't use decongestants if you have high blood pressure.  Follow-up care  Follow up with your healthcare provider if you do not improve over the next week.  Call 911  Get emergency medical care if any of the following occur:  · Convulsion  · Feeling weak, dizzy, or like you are going to faint  · Chest pain, shortness of breath, wheezing, or difficulty breathing  When to seek medical advice  Call your healthcare provider right away if any of these occur:  · Cough with lots of colored sputum (mucus) or blood in your sputum  · Chest pain, shortness of breath, wheezing, or difficulty breathing  · Severe headache; face, neck, or ear pain  · Severe, constant pain in the lower right side of your belly (abdominal)  · Continued vomiting (cant keep liquids down)  · Frequent diarrhea (more than 5 times a day); blood (red or black color) or mucus in diarrhea  · Feeling weak, dizzy, or like you are going to faint  · Extreme thirst  · Fever of 100.4°F (38°C) or higher, or as directed by your healthcare provider  Date Last Reviewed: 9/25/2015  © 3017-6080 [employer]. [address] [place] [address],  [place] [address]. All rights reserved. This information is not intended as a substitute for professional medical care. Always follow your healthcare professional's instructions.        Abdominal Pain    Abdominal pain is pain in the stomach or belly area. Everyone has this pain from time to time. In many cases it goes away on its own. But abdominal pain can sometimes be due to a serious problem, such as appendicitis. So its important to know when to seek help.  Causes of abdominal pain  There are many possible causes of abdominal pain. Common causes in adults include:  · Constipation, diarrhea, or gas  · Stomach acid flowing back up into the esophagus (acid reflux or heartburn)  · Severe acid reflux, called GERD (gastroesophageal reflux disease)  · A sore in the lining of the stomach or small intestine (peptic ulcer)  · Inflammation of the gallbladder, liver, or pancreas  · Gallstones or kidney stones  · Appendicitis   · Intestinal blockage   · An internal organ pushing through a muscle or other tissue (hernia)  · Urinary tract infections  · In women, menstrual cramps, fibroids, or endometriosis  · Inflammation or infection of the intestines  Diagnosing the cause of abdominal pain  Your healthcare provider will do a physical exam help find the cause of your pain. If needed, tests will be ordered. Belly pain has many possible causes. So it can be hard to find the reason for your pain. Giving details about your pain can help. Tell your provider where and when you feel the pain, and what makes it better or worse. Also let your provider know if you have other symptoms such as:  · Fever  · Tiredness  · Upset stomach (nausea)  · Vomiting  · Changes in bathroom habits  Treating abdominal pain  Some causes of pain need emergency medical treatment right away. These include appendicitis or a bowel blockage. Other problems can be treated with rest, fluids, or medicines. Your healthcare provider can give you specific  instructions for treatment or self-care based on what is causing your pain.  If you have vomiting or diarrhea, sip water or other clear fluids. When you are ready to eat solid foods again, start with small amounts of easy-to-digest, low-fat foods. These include apple sauce, toast, or crackers.   When to seek medical care  Call 911 or go to the hospital right away if you:  · Cant pass stool and are vomiting  · Are vomiting blood or have bloody diarrhea or black, tarry diarrhea  · Have chest, neck, or shoulder pain  · Feel like you might pass out  · Have pain in your shoulder blades with nausea  · Have sudden, severe belly pain  · Have new, severe pain unlike any you have felt before  · Have a belly that is rigid, hard, and tender to touch  Call your healthcare provider if you have:  · Pain for more than 5 days  · Bloating for more than 2 days  · Diarrhea for more than 5 days  · A fever of 100.4°F (38.0°C) or higher, or as directed by your provider  · Pain that gets worse  · Weight loss for no reason  · Continued lack of appetite  · Blood in your stool  How to prevent abdominal pain  Here are some tips to help prevent abdominal pain:  · Eat smaller amounts of food at one time.  · Avoid greasy, fried, or other high-fat foods.  · Avoid foods that give you gas.  · Exercise regularly.  · Drink plenty of fluids.  To help prevent GERD symptoms:  · Quit smoking.  · Reduce alcohol and certain foods that increase stomach acid.  · Avoid aspirin and over-the-counter pain and fever medicines (NSAIDS or nonsteroidal anti-inflammatory drugs), if possible  · Lose extra weight.  · Finish eating at least 2 hours before you go to bed or lie down.  · Raise the head of your bed.  Date Last Reviewed: 7/1/2016  © 0679-7140 [employer]. [address] [place] [address], [place] [address]. All rights reserved. This information is not intended as a substitute for professional medical care. Always follow your healthcare professional's  instructions.

## 2019-10-08 NOTE — PATIENT INSTRUCTIONS
Flu and Strep Tested Negative during your Urgent Care visit.     You received a steroid shot today - this can elevate your blood pressure, elevate your blood sugar, water weight gain, nervous energy, redness to the face and dimpling of the skin where the shot goes in.     Below are suggestions for symptomatic relief:   -Tylenol every 4 hours OR ibuprofen every 6 hours as needed for pain/fever.   -Salt water gargles to soothe throat pain.   -Chloroseptic spray also helps to numb throat pain.   -Nasal saline spray reduces inflammation and dryness.   -Warm face compresses to help with facial sinus pain/pressure.   -Vicks vapor rub at night.   -Flonase OTC or Nasacort OTC for nasal congestion.   -Simple foods like chicken noodle soup.   -Delsym helps with coughing at night   -Zyrtec/Claritin during the day & Benadryl at night may help with allergies.   -Marcum's Honey Lemon Drops for sore throat.     If you were prescribed a narcotic or controlled medication, do not drive or operate heavy equipment or machinery while taking these medications.  You must understand that you've received an Urgent Care treatment only and that you may be released before all your medical problems are known or treated. You, the patient, will arrange for follow up care as instructed.  Follow up with your PCP or specialty clinic as directed within 2-5 days if not improved or as needed.  You can call [phone] to schedule an appointment with the appropriate provider.  If your condition worsens we recommend that you receive another evaluation at the emergency room immediately or contact your primary medical clinics after hours call service to discuss your concerns.  Please return here or go to the Emergency Department for any concerns or worsening of condition.      Viral Syndrome (Adult)  A viral illness may cause a number of symptoms. The symptoms depend on the part of the body that the virus affects. If it settles in your nose, throat, and  "lungs, it may cause cough, sore throat, congestion, and sometimes headache. If it settles in your stomach and intestinal tract, it may cause vomiting and diarrhea. Sometimes it causes vague symptoms like "aching all over," feeling tired, loss of appetite, or fever.  A viral illness usually lasts 1 to 2 weeks, but sometimes it lasts longer. In some cases, a more serious infection can look like a viral syndrome in the first few days of the illness. You may need another exam and additional tests to know the difference. Watch for the warning signs listed below.  Home care  Follow these guidelines for taking care of yourself at home:  · If symptoms are severe, rest at home for the first 2 to 3 days.  · Stay away from cigarette smoke - both your smoke and the smoke from others.  · You may use over-the-counter acetaminophen or ibuprofen for fever, muscle aching, and headache, unless another medicine was prescribed for this. If you have chronic liver or kidney disease or ever had a stomach ulcer or GI bleeding, talk with your doctor before using these medicines. No one who is younger than 18 and ill with a fever should take aspirin. It may cause severe disease or death.  · Your appetite may be poor, so a light diet is fine. Avoid dehydration by drinking 8 to 12 8-ounce glasses of fluids each day. This may include water; orange juice; lemonade; apple, grape, and cranberry juice; clear fruit drinks; electrolyte replacement and sports drinks; and decaffeinated teas and coffee. If you have been diagnosed with a kidney disease, ask your doctor how much and what types of fluids you should drink to prevent dehydration. If you have kidney disease, drinking too much fluid can cause it build up in the your body and be dangerous to your health.  · Over-the-counter remedies won't shorten the length of the illness but may be helpful for cough, sore throat; and nasal and sinus congestion. Don't use decongestants if you have high blood " pressure.  Follow-up care  Follow up with your healthcare provider if you do not improve over the next week.  Call 911  Get emergency medical care if any of the following occur:  · Convulsion  · Feeling weak, dizzy, or like you are going to faint  · Chest pain, shortness of breath, wheezing, or difficulty breathing  When to seek medical advice  Call your healthcare provider right away if any of these occur:  · Cough with lots of colored sputum (mucus) or blood in your sputum  · Chest pain, shortness of breath, wheezing, or difficulty breathing  · Severe headache; face, neck, or ear pain  · Severe, constant pain in the lower right side of your belly (abdominal)  · Continued vomiting (cant keep liquids down)  · Frequent diarrhea (more than 5 times a day); blood (red or black color) or mucus in diarrhea  · Feeling weak, dizzy, or like you are going to faint  · Extreme thirst  · Fever of 100.4°F (38°C) or higher, or as directed by your healthcare provider  Date Last Reviewed: 9/25/2015 © 2000-2017 [employer]. [address] [place] [address]. All rights reserved. This information is not intended as a substitute for professional medical care. Always follow your healthcare professional's instructions.        Abdominal Pain    Abdominal pain is pain in the stomach or belly area. Everyone has this pain from time to time. In many cases it goes away on its own. But abdominal pain can sometimes be due to a serious problem, such as appendicitis. So its important to know when to seek help.  Causes of abdominal pain  There are many possible causes of abdominal pain. Common causes in adults include:  · Constipation, diarrhea, or gas  · Stomach acid flowing back up into the esophagus (acid reflux or heartburn)  · Severe acid reflux, called GERD (gastroesophageal reflux disease)  · A sore in the lining of the stomach or small intestine (peptic ulcer)  · Inflammation of the gallbladder, liver, or  pancreas  · Gallstones or kidney stones  · Appendicitis   · Intestinal blockage   · An internal organ pushing through a muscle or other tissue (hernia)  · Urinary tract infections  · In women, menstrual cramps, fibroids, or endometriosis  · Inflammation or infection of the intestines  Diagnosing the cause of abdominal pain  Your healthcare provider will do a physical exam help find the cause of your pain. If needed, tests will be ordered. Belly pain has many possible causes. So it can be hard to find the reason for your pain. Giving details about your pain can help. Tell your provider where and when you feel the pain, and what makes it better or worse. Also let your provider know if you have other symptoms such as:  · Fever  · Tiredness  · Upset stomach (nausea)  · Vomiting  · Changes in bathroom habits  Treating abdominal pain  Some causes of pain need emergency medical treatment right away. These include appendicitis or a bowel blockage. Other problems can be treated with rest, fluids, or medicines. Your healthcare provider can give you specific instructions for treatment or self-care based on what is causing your pain.  If you have vomiting or diarrhea, sip water or other clear fluids. When you are ready to eat solid foods again, start with small amounts of easy-to-digest, low-fat foods. These include apple sauce, toast, or crackers.   When to seek medical care  Call 911 or go to the hospital right away if you:  · Cant pass stool and are vomiting  · Are vomiting blood or have bloody diarrhea or black, tarry diarrhea  · Have chest, neck, or shoulder pain  · Feel like you might pass out  · Have pain in your shoulder blades with nausea  · Have sudden, severe belly pain  · Have new, severe pain unlike any you have felt before  · Have a belly that is rigid, hard, and tender to touch  Call your healthcare provider if you have:  · Pain for more than 5 days  · Bloating for more than 2 days  · Diarrhea for more  than 5 days  · A fever of 100.4°F (38.0°C) or higher, or as directed by your provider  · Pain that gets worse  · Weight loss for no reason  · Continued lack of appetite  · Blood in your stool  How to prevent abdominal pain  Here are some tips to help prevent abdominal pain:  · Eat smaller amounts of food at one time.  · Avoid greasy, fried, or other high-fat foods.  · Avoid foods that give you gas.  · Exercise regularly.  · Drink plenty of fluids.  To help prevent GERD symptoms:  · Quit smoking.  · Reduce alcohol and certain foods that increase stomach acid.  · Avoid aspirin and over-the-counter pain and fever medicines (NSAIDS or nonsteroidal anti-inflammatory drugs), if possible  · Lose extra weight.  · Finish eating at least 2 hours before you go to bed or lie down.  · Raise the head of your bed.  Date Last Reviewed: 7/1/2016  © 1700-5642 [employer]. [address] [place] [address], [place] [address]. All rights reserved. This information is not intended as a substitute for professional medical care. Always follow your healthcare professional's instructions.

## 2020-01-19 ENCOUNTER — OFFICE VISIT (OUTPATIENT)
Dept: URGENT CARE | Facility: CLINIC | Age: 40
End: 2020-01-19
Payer: COMMERCIAL

## 2020-01-19 VITALS
OXYGEN SATURATION: 99 % | HEIGHT: 64 IN | RESPIRATION RATE: 18 BRPM | TEMPERATURE: 100 F | WEIGHT: 165 LBS | DIASTOLIC BLOOD PRESSURE: 70 MMHG | HEART RATE: 112 BPM | SYSTOLIC BLOOD PRESSURE: 130 MMHG | BODY MASS INDEX: 28.17 KG/M2

## 2020-01-19 DIAGNOSIS — R05.9 COUGH: Primary | ICD-10-CM

## 2020-01-19 DIAGNOSIS — J11.1 FLU SYNDROME: ICD-10-CM

## 2020-01-19 DIAGNOSIS — K21.00 REFLUX ESOPHAGITIS: ICD-10-CM

## 2020-01-19 LAB
CTP QC/QA: YES
FLUAV AG NPH QL: POSITIVE
FLUBV AG NPH QL: NEGATIVE

## 2020-01-19 PROCEDURE — 87804 INFLUENZA ASSAY W/OPTIC: CPT | Mod: QW,S$GLB,, | Performed by: FAMILY MEDICINE

## 2020-01-19 PROCEDURE — 99214 OFFICE O/P EST MOD 30 MIN: CPT | Mod: 25,S$GLB,, | Performed by: FAMILY MEDICINE

## 2020-01-19 PROCEDURE — 87804 POCT INFLUENZA A/B: ICD-10-PCS | Mod: 59,QW,S$GLB, | Performed by: FAMILY MEDICINE

## 2020-01-19 PROCEDURE — 99214 PR OFFICE/OUTPT VISIT, EST, LEVL IV, 30-39 MIN: ICD-10-PCS | Mod: 25,S$GLB,, | Performed by: FAMILY MEDICINE

## 2020-01-19 RX ORDER — OSELTAMIVIR PHOSPHATE 75 MG/1
75 CAPSULE ORAL 2 TIMES DAILY
Qty: 10 CAPSULE | Refills: 0 | Status: SHIPPED | OUTPATIENT
Start: 2020-01-19 | End: 2020-01-24

## 2020-01-19 RX ORDER — PROMETHAZINE HYDROCHLORIDE AND DEXTROMETHORPHAN HYDROBROMIDE 6.25; 15 MG/5ML; MG/5ML
SYRUP ORAL
Qty: 180 ML | Refills: 0 | Status: SHIPPED | OUTPATIENT
Start: 2020-01-19 | End: 2020-03-04 | Stop reason: ALTCHOICE

## 2020-01-19 RX ORDER — PANTOPRAZOLE SODIUM 40 MG/1
40 TABLET, DELAYED RELEASE ORAL DAILY
Qty: 30 TABLET | Refills: 1 | Status: SHIPPED | OUTPATIENT
Start: 2020-01-19 | End: 2020-02-18

## 2020-01-19 RX ORDER — LORATADINE 10 MG/1
10 TABLET ORAL DAILY
Qty: 30 TABLET | Refills: 2 | Status: SHIPPED | OUTPATIENT
Start: 2020-01-19 | End: 2020-03-04 | Stop reason: ALTCHOICE

## 2020-01-19 NOTE — PROGRESS NOTES
"Subjective:       Patient ID: Venkatesh Pritchett is a 39 y.o. female.    Vitals:  height is 5' 4" (1.626 m) and weight is 74.8 kg (165 lb). Her temperature is 99.6 °F (37.6 °C). Her blood pressure is 130/70 and her pulse is 112 (abnormal). Her respiration is 18 and oxygen saturation is 99%.     Chief Complaint: Cough    39-year-old female with complaint of sore throat cough and congestion for 2 weeks today started with fever and achiness mom and some mid abdominal pain she already has a history of IBS and feels like she is having heartburn and reflux symptoms    Cough   This is a new problem. The current episode started yesterday. The problem has been unchanged. The problem occurs constantly. The cough is productive of sputum. Associated symptoms include a fever and a sore throat. Pertinent negatives include no chills, ear pain, eye redness, hemoptysis, myalgias, rash, shortness of breath or wheezing. Nothing aggravates the symptoms. She has tried OTC cough suppressant for the symptoms. The treatment provided mild relief.       Constitution: Positive for fatigue and fever. Negative for chills and sweating.   HENT: Positive for sinus pain, sinus pressure and sore throat. Negative for ear pain, congestion and voice change.    Neck: Negative for painful lymph nodes.   Eyes: Negative for eye redness.   Respiratory: Positive for cough and sputum production. Negative for chest tightness, bloody sputum, COPD, shortness of breath, stridor, wheezing and asthma.    Gastrointestinal: Negative for nausea and vomiting.   Musculoskeletal: Negative for muscle ache.   Skin: Negative for rash.   Allergic/Immunologic: Negative for seasonal allergies and asthma.   Hematologic/Lymphatic: Negative for swollen lymph nodes.       Objective:      Physical Exam   Constitutional: She is oriented to person, place, and time. She appears well-developed and well-nourished. She is cooperative.  Non-toxic appearance. She does not appear ill.   HENT: "   Head: Normocephalic and atraumatic.   Right Ear: Hearing, tympanic membrane, external ear and ear canal normal.   Left Ear: Hearing, tympanic membrane, external ear and ear canal normal.   Nose: Nose normal. No mucosal edema, rhinorrhea or nasal deformity. No epistaxis. Right sinus exhibits no maxillary sinus tenderness and no frontal sinus tenderness. Left sinus exhibits no maxillary sinus tenderness and no frontal sinus tenderness.   Mouth/Throat: Uvula is midline, oropharynx is clear and moist and mucous membranes are normal. No trismus in the jaw. Normal dentition. No uvula swelling. No oropharyngeal exudate or posterior oropharyngeal erythema.   Eyes: Conjunctivae and lids are normal. Right eye exhibits no discharge. Left eye exhibits no discharge. No scleral icterus.   Neck: Trachea normal, normal range of motion, full passive range of motion without pain and phonation normal. Neck supple. No JVD present.   Cardiovascular: Normal rate, regular rhythm, normal heart sounds, intact distal pulses and normal pulses. Exam reveals no friction rub.   No murmur heard.  Pulmonary/Chest: Effort normal and breath sounds normal. No stridor. No respiratory distress. She has no wheezes. She has no rales.   Abdominal: Soft. Normal appearance and bowel sounds are normal. She exhibits no distension, no pulsatile midline mass and no mass. There is no tenderness. There is no guarding.   Musculoskeletal: Normal range of motion. She exhibits no edema or deformity.   Lymphadenopathy:     She has no cervical adenopathy.   Neurological: She is alert and oriented to person, place, and time. She exhibits normal muscle tone. Coordination normal.   Skin: Skin is warm, dry, intact, not diaphoretic and not pale.   Psychiatric: She has a normal mood and affect. Her speech is normal and behavior is normal. Judgment and thought content normal. Cognition and memory are normal.   Nursing note and vitals reviewed.        Assessment:       1.  Cough    2. Flu syndrome    3. Reflux esophagitis        Plan:         Cough  -     POCT Influenza A/B    Flu syndrome    Reflux esophagitis    Other orders  -     oseltamivir (TAMIFLU) 75 MG capsule; Take 1 capsule (75 mg total) by mouth 2 (two) times daily. for 5 days  Dispense: 10 capsule; Refill: 0  -     promethazine-dextromethorphan (PROMETHAZINE-DM) 6.25-15 mg/5 mL Syrp; Take one tsp po q 6 hrs prn cough  Dispense: 180 mL; Refill: 0  -     pantoprazole (PROTONIX) 40 MG tablet; Take 1 tablet (40 mg total) by mouth once daily.  Dispense: 30 tablet; Refill: 1  -     loratadine (CLARITIN) 10 mg tablet; Take 1 tablet (10 mg total) by mouth once daily.  Dispense: 30 tablet; Refill: 2         Patient advised to take Tylenol alternate with ibuprofen 2 p.o. q.6 hours p.r.n. fever achiness rest and fluids follow up with primary care physician for her reflux symptoms

## 2020-01-21 ENCOUNTER — OFFICE VISIT (OUTPATIENT)
Dept: URGENT CARE | Facility: CLINIC | Age: 40
End: 2020-01-21
Payer: COMMERCIAL

## 2020-01-21 VITALS
HEIGHT: 64 IN | BODY MASS INDEX: 28.17 KG/M2 | OXYGEN SATURATION: 98 % | HEART RATE: 96 BPM | SYSTOLIC BLOOD PRESSURE: 100 MMHG | DIASTOLIC BLOOD PRESSURE: 71 MMHG | WEIGHT: 165 LBS | RESPIRATION RATE: 16 BRPM | TEMPERATURE: 99 F

## 2020-01-21 DIAGNOSIS — J30.1 SEASONAL ALLERGIC RHINITIS DUE TO POLLEN: ICD-10-CM

## 2020-01-21 DIAGNOSIS — J11.1 FLU SYNDROME: Primary | ICD-10-CM

## 2020-01-21 PROCEDURE — 99213 PR OFFICE/OUTPT VISIT, EST, LEVL III, 20-29 MIN: ICD-10-PCS | Mod: S$GLB,,, | Performed by: FAMILY MEDICINE

## 2020-01-21 PROCEDURE — 99213 OFFICE O/P EST LOW 20 MIN: CPT | Mod: S$GLB,,, | Performed by: FAMILY MEDICINE

## 2020-01-21 RX ORDER — PREDNISONE 10 MG/1
TABLET ORAL
Qty: 30 TABLET | Refills: 0 | Status: SHIPPED | OUTPATIENT
Start: 2020-01-21 | End: 2020-03-04 | Stop reason: ALTCHOICE

## 2020-01-21 NOTE — PROGRESS NOTES
"Subjective:       Patient ID: Venkatesh Pritchett is a 39 y.o. female.    Vitals:  height is 5' 4" (1.626 m) and weight is 74.8 kg (165 lb). Her oral temperature is 98.6 °F (37 °C). Her blood pressure is 100/71 and her pulse is 96. Her respiration is 16 and oxygen saturation is 98%.     Chief Complaint: Cough    Patient complains of persistent achiness sinus congestion and earache no fever she was treated for flu came positive for flu and has been    Cough   This is a new problem. Episode onset: 2 days. The problem has been unchanged. The problem occurs every few minutes. The cough is productive of sputum. Associated symptoms include chills, headaches, myalgias, nasal congestion and postnasal drip. Pertinent negatives include no ear pain, eye redness, fever, hemoptysis, rash, sore throat, shortness of breath or wheezing. Nothing aggravates the symptoms. She has tried prescription cough suppressant for the symptoms. The treatment provided mild relief.       Constitution: Positive for chills and fatigue. Negative for sweating and fever.   HENT: Positive for congestion, postnasal drip, sinus pain and sinus pressure. Negative for ear pain, sore throat and voice change.    Neck: Negative for painful lymph nodes.   Eyes: Negative for eye redness.   Respiratory: Positive for cough and sputum production. Negative for chest tightness, bloody sputum, COPD, shortness of breath, stridor, wheezing and asthma.    Gastrointestinal: Positive for diarrhea. Negative for nausea and vomiting.   Musculoskeletal: Positive for muscle ache.   Skin: Negative for rash.   Allergic/Immunologic: Negative for seasonal allergies and asthma.   Neurological: Positive for dizziness and headaches.   Hematologic/Lymphatic: Negative for swollen lymph nodes.       Objective:      Physical Exam   Constitutional: She is oriented to person, place, and time. She appears well-developed and well-nourished. She is cooperative.  Non-toxic appearance. She does not " appear ill. No distress.   HENT:   Head: Normocephalic and atraumatic.   Right Ear: Hearing, tympanic membrane, external ear and ear canal normal.   Left Ear: Hearing, tympanic membrane, external ear and ear canal normal.   Nose: Nose normal. No mucosal edema, rhinorrhea or nasal deformity. No epistaxis. Right sinus exhibits no maxillary sinus tenderness and no frontal sinus tenderness. Left sinus exhibits no maxillary sinus tenderness and no frontal sinus tenderness.   Mouth/Throat: Uvula is midline, oropharynx is clear and moist and mucous membranes are normal. No trismus in the jaw. Normal dentition. No uvula swelling. No posterior oropharyngeal erythema.   Throat is clear TMs normal   Eyes: Conjunctivae and lids are normal. Right eye exhibits no discharge. Left eye exhibits no discharge. No scleral icterus.   Neck: Trachea normal, normal range of motion, full passive range of motion without pain and phonation normal. Neck supple.   Cardiovascular: Normal rate, regular rhythm, normal heart sounds, intact distal pulses and normal pulses.   Pulmonary/Chest: Effort normal and breath sounds normal. No respiratory distress.   Abdominal: Soft. Normal appearance and bowel sounds are normal. She exhibits no distension, no pulsatile midline mass and no mass. There is no tenderness.   Musculoskeletal: Normal range of motion. She exhibits no edema or deformity.   Neurological: She is alert and oriented to person, place, and time. She exhibits normal muscle tone. Coordination normal.   Skin: Skin is warm, dry, intact, not diaphoretic and not pale.   Psychiatric: She has a normal mood and affect. Her speech is normal and behavior is normal. Judgment and thought content normal. Cognition and memory are normal.   Nursing note and vitals reviewed.        Assessment:       1. Flu syndrome    2. Seasonal allergic rhinitis due to pollen        Plan:         Flu syndrome    Seasonal allergic rhinitis due to pollen    Other orders  -      predniSONE (DELTASONE) 10 MG tablet; Take 2 po bid x 3 days, one bid x 3 days, then one daily till finish  Dispense: 30 tablet; Refill: 0         Claritin D 12 one po bid

## 2020-01-29 ENCOUNTER — TELEPHONE (OUTPATIENT)
Dept: INTERNAL MEDICINE | Facility: CLINIC | Age: 40
End: 2020-01-29

## 2020-01-30 ENCOUNTER — LAB VISIT (OUTPATIENT)
Dept: LAB | Facility: HOSPITAL | Age: 40
End: 2020-01-30
Payer: COMMERCIAL

## 2020-01-30 ENCOUNTER — OFFICE VISIT (OUTPATIENT)
Dept: INTERNAL MEDICINE | Facility: CLINIC | Age: 40
End: 2020-01-30
Attending: INTERNAL MEDICINE
Payer: COMMERCIAL

## 2020-01-30 VITALS
SYSTOLIC BLOOD PRESSURE: 102 MMHG | HEIGHT: 64 IN | BODY MASS INDEX: 29.43 KG/M2 | OXYGEN SATURATION: 98 % | HEART RATE: 84 BPM | WEIGHT: 172.38 LBS | DIASTOLIC BLOOD PRESSURE: 62 MMHG

## 2020-01-30 DIAGNOSIS — R10.84 GENERALIZED ABDOMINAL PAIN: Primary | ICD-10-CM

## 2020-01-30 DIAGNOSIS — R10.84 GENERALIZED ABDOMINAL PAIN: ICD-10-CM

## 2020-01-30 LAB
ALBUMIN SERPL BCP-MCNC: 3.9 G/DL (ref 3.5–5.2)
ALP SERPL-CCNC: 80 U/L (ref 55–135)
ALT SERPL W/O P-5'-P-CCNC: 22 U/L (ref 10–44)
ANION GAP SERPL CALC-SCNC: 8 MMOL/L (ref 8–16)
AST SERPL-CCNC: 14 U/L (ref 10–40)
BASOPHILS # BLD AUTO: 0.02 K/UL (ref 0–0.2)
BASOPHILS NFR BLD: 0.2 % (ref 0–1.9)
BILIRUB SERPL-MCNC: 1.3 MG/DL (ref 0.1–1)
BUN SERPL-MCNC: 12 MG/DL (ref 6–20)
CALCIUM SERPL-MCNC: 10.6 MG/DL (ref 8.7–10.5)
CHLORIDE SERPL-SCNC: 104 MMOL/L (ref 95–110)
CO2 SERPL-SCNC: 28 MMOL/L (ref 23–29)
CREAT SERPL-MCNC: 0.8 MG/DL (ref 0.5–1.4)
DIFFERENTIAL METHOD: NORMAL
EOSINOPHIL # BLD AUTO: 0.1 K/UL (ref 0–0.5)
EOSINOPHIL NFR BLD: 1 % (ref 0–8)
ERYTHROCYTE [DISTWIDTH] IN BLOOD BY AUTOMATED COUNT: 13.3 % (ref 11.5–14.5)
EST. GFR  (AFRICAN AMERICAN): >60 ML/MIN/1.73 M^2
EST. GFR  (NON AFRICAN AMERICAN): >60 ML/MIN/1.73 M^2
GLUCOSE SERPL-MCNC: 86 MG/DL (ref 70–110)
HCT VFR BLD AUTO: 39.2 % (ref 37–48.5)
HGB BLD-MCNC: 13.1 G/DL (ref 12–16)
LIPASE SERPL-CCNC: 29 U/L (ref 4–60)
LYMPHOCYTES # BLD AUTO: 2.1 K/UL (ref 1–4.8)
LYMPHOCYTES NFR BLD: 22.2 % (ref 18–48)
MCH RBC QN AUTO: 27.3 PG (ref 27–31)
MCHC RBC AUTO-ENTMCNC: 33.4 G/DL (ref 32–36)
MCV RBC AUTO: 82 FL (ref 82–98)
MONOCYTES # BLD AUTO: 0.6 K/UL (ref 0.3–1)
MONOCYTES NFR BLD: 6.6 % (ref 4–15)
NEUTROPHILS # BLD AUTO: 6.6 K/UL (ref 1.8–7.7)
NEUTROPHILS NFR BLD: 70 % (ref 38–73)
PLATELET # BLD AUTO: 290 K/UL (ref 150–350)
PMV BLD AUTO: 9.5 FL (ref 9.2–12.9)
POTASSIUM SERPL-SCNC: 4.3 MMOL/L (ref 3.5–5.1)
PROT SERPL-MCNC: 8.3 G/DL (ref 6–8.4)
RBC # BLD AUTO: 4.8 M/UL (ref 4–5.4)
SODIUM SERPL-SCNC: 140 MMOL/L (ref 136–145)
WBC # BLD AUTO: 9.43 K/UL (ref 3.9–12.7)

## 2020-01-30 PROCEDURE — 99213 PR OFFICE/OUTPT VISIT, EST, LEVL III, 20-29 MIN: ICD-10-PCS | Mod: S$GLB,,, | Performed by: INTERNAL MEDICINE

## 2020-01-30 PROCEDURE — 99213 OFFICE O/P EST LOW 20 MIN: CPT | Mod: S$GLB,,, | Performed by: INTERNAL MEDICINE

## 2020-01-30 PROCEDURE — 3008F PR BODY MASS INDEX (BMI) DOCUMENTED: ICD-10-PCS | Mod: CPTII,S$GLB,, | Performed by: INTERNAL MEDICINE

## 2020-01-30 PROCEDURE — 85025 COMPLETE CBC W/AUTO DIFF WBC: CPT

## 2020-01-30 PROCEDURE — 83690 ASSAY OF LIPASE: CPT

## 2020-01-30 PROCEDURE — 99999 PR PBB SHADOW E&M-EST. PATIENT-LVL III: ICD-10-PCS | Mod: PBBFAC,,, | Performed by: INTERNAL MEDICINE

## 2020-01-30 PROCEDURE — 99999 PR PBB SHADOW E&M-EST. PATIENT-LVL III: CPT | Mod: PBBFAC,,, | Performed by: INTERNAL MEDICINE

## 2020-01-30 PROCEDURE — 3008F BODY MASS INDEX DOCD: CPT | Mod: CPTII,S$GLB,, | Performed by: INTERNAL MEDICINE

## 2020-01-30 PROCEDURE — 36415 COLL VENOUS BLD VENIPUNCTURE: CPT

## 2020-01-30 PROCEDURE — 80053 COMPREHEN METABOLIC PANEL: CPT

## 2020-01-30 RX ORDER — ESOMEPRAZOLE MAGNESIUM 40 MG/1
40 CAPSULE, DELAYED RELEASE ORAL
Qty: 30 CAPSULE | Refills: 11 | Status: SHIPPED | OUTPATIENT
Start: 2020-01-30 | End: 2020-11-09

## 2020-01-30 NOTE — PROGRESS NOTES
Subjective:       Patient ID: Venkatesh Pritchett is a 39 y.o. female.    Chief Complaint: Heartburn    39 year old lady complains of a constant burning abdominal pain, made worse by coffee.  Has been given Protonix with some improvement but stopped it when the rx ran out.  Was told many years ago that she has  IBS.  This was at [facility]    Review of Systems   Constitutional: Negative for activity change, chills, fatigue and fever.   HENT: Negative for congestion, ear pain, nosebleeds, postnasal drip, sinus pressure and sore throat.    Eyes: Negative.  Negative for visual disturbance.   Respiratory: Negative for cough, chest tightness, shortness of breath and wheezing.    Cardiovascular: Negative for chest pain.   Gastrointestinal: Negative for abdominal pain, diarrhea, nausea and vomiting.   Genitourinary: Negative for difficulty urinating, dysuria, frequency and urgency.   Musculoskeletal: Negative for arthralgias and neck stiffness.   Skin: Negative for rash.   Neurological: Negative for dizziness, weakness and headaches.   Psychiatric/Behavioral: Negative for sleep disturbance. The patient is not nervous/anxious.        Objective:      Physical Exam   Constitutional: She is oriented to person, place, and time. She appears well-developed and well-nourished.  Non-toxic appearance. No distress.   HENT:   Head: Normocephalic and atraumatic.   Right Ear: Tympanic membrane, external ear and ear canal normal.   Left Ear: Tympanic membrane, external ear and ear canal normal.   Eyes: Pupils are equal, round, and reactive to light. EOM are normal. No scleral icterus.   Neck: Normal range of motion. Neck supple. No thyromegaly present.   Cardiovascular: Normal rate, regular rhythm and normal heart sounds.   Pulmonary/Chest: Effort normal and breath sounds normal.   Abdominal: Soft. Bowel sounds are normal. She exhibits no mass. There is generalized tenderness. There is no rigidity, no rebound, no guarding and no CVA tenderness.    Musculoskeletal: Normal range of motion.   Lymphadenopathy:     She has no cervical adenopathy.   Neurological: She is alert and oriented to person, place, and time. She has normal reflexes. She displays normal reflexes. No cranial nerve deficit. She exhibits normal muscle tone. Coordination normal.   Skin: Skin is warm and dry.   Psychiatric: She has a normal mood and affect. Her behavior is normal.       Assessment:       1. Generalized abdominal pain        Plan:   Venkatesh was seen today for heartburn.    Diagnoses and all orders for this visit:    Generalized abdominal pain  -     CBC auto differential; Future  -     Comprehensive metabolic panel; Future  -     Lipase; Future  -     CT Abdomen Pelvis With Contrast; Future  -     Ambulatory consult to Gastroenterology    Other orders  -     (pyxis) gi cocktail (mylanta 30 mL, lidocaine 2 % viscous 10 mL, dicyclomine 10 mL) 50 mL  -     esomeprazole (NEXIUM) 40 MG capsule; Take 1 capsule (40 mg total) by mouth before breakfast.

## 2020-02-02 ENCOUNTER — PATIENT OUTREACH (OUTPATIENT)
Dept: ADMINISTRATIVE | Facility: OTHER | Age: 40
End: 2020-02-02

## 2020-02-14 ENCOUNTER — TELEPHONE (OUTPATIENT)
Dept: GASTROENTEROLOGY | Facility: CLINIC | Age: 40
End: 2020-02-14

## 2020-02-14 NOTE — TELEPHONE ENCOUNTER
----- Message from Paola Worrell sent at 2/14/2020 11:42 AM CST -----  Contact: pt  Pt would like to be called back regarding loose stool, IBS. and stomach pain  Pt can be reached at [phone]

## 2020-02-14 NOTE — TELEPHONE ENCOUNTER
Spoke with patient and scheduled appointment for 3/6 at 1:00 p.m. with DARLYN Briones.  Pt acceptd appt.  ----- Message from Debbi Fairchild sent at 2/14/2020  1:58 PM CST -----  I have a pt that feels the need to be seen today for  loose stool, IBS. and stomach pain. Call back [phone]

## 2020-02-18 ENCOUNTER — LAB VISIT (OUTPATIENT)
Dept: LAB | Facility: HOSPITAL | Age: 40
End: 2020-02-18
Attending: INTERNAL MEDICINE
Payer: COMMERCIAL

## 2020-02-18 ENCOUNTER — PATIENT OUTREACH (OUTPATIENT)
Dept: ADMINISTRATIVE | Facility: OTHER | Age: 40
End: 2020-02-18

## 2020-02-18 ENCOUNTER — OFFICE VISIT (OUTPATIENT)
Dept: GASTROENTEROLOGY | Facility: CLINIC | Age: 40
End: 2020-02-18
Payer: COMMERCIAL

## 2020-02-18 VITALS
WEIGHT: 171.31 LBS | SYSTOLIC BLOOD PRESSURE: 118 MMHG | DIASTOLIC BLOOD PRESSURE: 72 MMHG | BODY MASS INDEX: 29.24 KG/M2 | HEIGHT: 64 IN

## 2020-02-18 DIAGNOSIS — K59.00 CONSTIPATION, UNSPECIFIED CONSTIPATION TYPE: Primary | ICD-10-CM

## 2020-02-18 DIAGNOSIS — R19.8 TENESMUS: ICD-10-CM

## 2020-02-18 DIAGNOSIS — K59.00 CONSTIPATION, UNSPECIFIED CONSTIPATION TYPE: ICD-10-CM

## 2020-02-18 DIAGNOSIS — R19.5 ABNORMAL STOOLS: ICD-10-CM

## 2020-02-18 DIAGNOSIS — R10.9 ABDOMINAL PAIN, UNSPECIFIED ABDOMINAL LOCATION: ICD-10-CM

## 2020-02-18 DIAGNOSIS — Z12.31 ENCOUNTER FOR SCREENING MAMMOGRAM FOR MALIGNANT NEOPLASM OF BREAST: Primary | ICD-10-CM

## 2020-02-18 LAB
ALBUMIN SERPL BCP-MCNC: 3.7 G/DL (ref 3.5–5.2)
ALP SERPL-CCNC: 83 U/L (ref 55–135)
ALT SERPL W/O P-5'-P-CCNC: 10 U/L (ref 10–44)
ANION GAP SERPL CALC-SCNC: 10 MMOL/L (ref 8–16)
AST SERPL-CCNC: 12 U/L (ref 10–40)
BASOPHILS # BLD AUTO: 0.04 K/UL (ref 0–0.2)
BASOPHILS NFR BLD: 0.4 % (ref 0–1.9)
BILIRUB SERPL-MCNC: 1.3 MG/DL (ref 0.1–1)
BUN SERPL-MCNC: 7 MG/DL (ref 6–20)
CALCIUM SERPL-MCNC: 9.5 MG/DL (ref 8.7–10.5)
CHLORIDE SERPL-SCNC: 107 MMOL/L (ref 95–110)
CO2 SERPL-SCNC: 22 MMOL/L (ref 23–29)
CREAT SERPL-MCNC: 0.8 MG/DL (ref 0.5–1.4)
CRP SERPL-MCNC: 16.1 MG/L (ref 0–8.2)
DIFFERENTIAL METHOD: ABNORMAL
EOSINOPHIL # BLD AUTO: 0 K/UL (ref 0–0.5)
EOSINOPHIL NFR BLD: 0.4 % (ref 0–8)
ERYTHROCYTE [DISTWIDTH] IN BLOOD BY AUTOMATED COUNT: 13.3 % (ref 11.5–14.5)
ERYTHROCYTE [SEDIMENTATION RATE] IN BLOOD BY WESTERGREN METHOD: 31 MM/HR (ref 0–36)
EST. GFR  (AFRICAN AMERICAN): >60 ML/MIN/1.73 M^2
EST. GFR  (NON AFRICAN AMERICAN): >60 ML/MIN/1.73 M^2
GLUCOSE SERPL-MCNC: 88 MG/DL (ref 70–110)
HCT VFR BLD AUTO: 40.8 % (ref 37–48.5)
HGB BLD-MCNC: 12.4 G/DL (ref 12–16)
IGA SERPL-MCNC: 286 MG/DL (ref 40–350)
IMM GRANULOCYTES # BLD AUTO: 0.02 K/UL (ref 0–0.04)
IMM GRANULOCYTES NFR BLD AUTO: 0.2 % (ref 0–0.5)
LYMPHOCYTES # BLD AUTO: 2.2 K/UL (ref 1–4.8)
LYMPHOCYTES NFR BLD: 23.1 % (ref 18–48)
MCH RBC QN AUTO: 26.7 PG (ref 27–31)
MCHC RBC AUTO-ENTMCNC: 30.4 G/DL (ref 32–36)
MCV RBC AUTO: 88 FL (ref 82–98)
MONOCYTES # BLD AUTO: 0.5 K/UL (ref 0.3–1)
MONOCYTES NFR BLD: 5 % (ref 4–15)
NEUTROPHILS # BLD AUTO: 6.8 K/UL (ref 1.8–7.7)
NEUTROPHILS NFR BLD: 70.9 % (ref 38–73)
NRBC BLD-RTO: 0 /100 WBC
PLATELET # BLD AUTO: 335 K/UL (ref 150–350)
PMV BLD AUTO: 10 FL (ref 9.2–12.9)
POTASSIUM SERPL-SCNC: 4.4 MMOL/L (ref 3.5–5.1)
PROT SERPL-MCNC: 7.8 G/DL (ref 6–8.4)
RBC # BLD AUTO: 4.64 M/UL (ref 4–5.4)
SODIUM SERPL-SCNC: 139 MMOL/L (ref 136–145)
WBC # BLD AUTO: 9.65 K/UL (ref 3.9–12.7)

## 2020-02-18 PROCEDURE — 99204 PR OFFICE/OUTPT VISIT, NEW, LEVL IV, 45-59 MIN: ICD-10-PCS | Mod: S$GLB,,, | Performed by: NURSE PRACTITIONER

## 2020-02-18 PROCEDURE — 36415 COLL VENOUS BLD VENIPUNCTURE: CPT | Mod: PO

## 2020-02-18 PROCEDURE — 86677 HELICOBACTER PYLORI ANTIBODY: CPT

## 2020-02-18 PROCEDURE — 86140 C-REACTIVE PROTEIN: CPT

## 2020-02-18 PROCEDURE — 85652 RBC SED RATE AUTOMATED: CPT

## 2020-02-18 PROCEDURE — 80053 COMPREHEN METABOLIC PANEL: CPT

## 2020-02-18 PROCEDURE — 99999 PR PBB SHADOW E&M-EST. PATIENT-LVL III: CPT | Mod: PBBFAC,,, | Performed by: NURSE PRACTITIONER

## 2020-02-18 PROCEDURE — 83516 IMMUNOASSAY NONANTIBODY: CPT

## 2020-02-18 PROCEDURE — 85025 COMPLETE CBC W/AUTO DIFF WBC: CPT

## 2020-02-18 PROCEDURE — 82784 ASSAY IGA/IGD/IGG/IGM EACH: CPT

## 2020-02-18 PROCEDURE — 99204 OFFICE O/P NEW MOD 45 MIN: CPT | Mod: S$GLB,,, | Performed by: NURSE PRACTITIONER

## 2020-02-18 PROCEDURE — 3008F PR BODY MASS INDEX (BMI) DOCUMENTED: ICD-10-PCS | Mod: CPTII,S$GLB,, | Performed by: NURSE PRACTITIONER

## 2020-02-18 PROCEDURE — 3008F BODY MASS INDEX DOCD: CPT | Mod: CPTII,S$GLB,, | Performed by: NURSE PRACTITIONER

## 2020-02-18 PROCEDURE — 99999 PR PBB SHADOW E&M-EST. PATIENT-LVL III: ICD-10-PCS | Mod: PBBFAC,,, | Performed by: NURSE PRACTITIONER

## 2020-02-18 NOTE — PROGRESS NOTES
Care Everywhere: updated  Immunization: updated  Health Maintenance: updated  Media Review: n/a  Legacy Review: n/a  Order placed: mammogram   Upcoming appts:n/a

## 2020-02-18 NOTE — PATIENT INSTRUCTIONS
Women need 25 grams of fiber per day, and men need 38 grams per day, according to the [employer].    If you're not meeting your recommended daily dose of fiber via food, fiber supplementation is beneficial in helping meet those daily recommendations. Be sure to drink plenty of water while taking fiber supplementation. Make sure to read fiber supplementation drug label directions regarding when and how to take the supplementation. TRY SOMETHING LIKE BENEFIBER, CITRUCEL, METAMUCIL    Dietary fiber content of frequently consumed foods  Food Fiber, g/serving   Fruits   Apple (with skin) 3.5/1 medium-sized apple   Apricot (fresh) 1.8/3 apricots   Banana 2.5/1 banana   Cantaloupe 2.7/half edible portion   Dates 13.5/1 cup (chopped)   Grapefruit 1.6/half edible portion   Grapes 2.6/10 grapes   Oranges 2.6/1 orange   Peach (with skin) 2.1/1 peach   Pear (with skin) 4.6/1 pear   Pineapple 2.2/1 cup (diced)   Prunes 11.9/11 dried prunes   Raisins 2.2/packet   Strawberries 3.0/1 cup   Juices   Apple 0.74/1 cup   Grapefruit 1.0/1 cup   Grape 1.3/1 cup   Orange 1.0/1 cup   Vegetables   Cooked   Asparagus 1.5/7 martinez   Beans, string, green 3.4/1 cup   Broccoli 5.0/1 stalk   [place] sprouts 4.6/7-8 sprouts   Cabbage 2.9/1 cup (cooked)   Carrots 4.6/1 cup   Cauliflower 2.1/1 cup   Peas 7.2/1 cup (cooked)   Potato (with skin) 2.3/1 boiled   Spinach 4.1/1 cup (raw)   Squash, summer 3.4/1 cup (cooked, diced)   Sweet potatoes 2.7/1 baked   Zucchini 4.2/1 cup (cooked, diced)   Raw   Cucumber 0.2/6-8 slices with skin   Lettuce 2.0/1 wedge iceberg   Mushrooms 0.8/half cup (sliced)   Onions 1.3/1 cup   Peppers, green 1.0/1 pod   Tomato 1.8/1 tomato   Spinach 8.0/1 cup (chopped)   Legumes   Baked beans 18.6/1 cup   Dried peas 4.7/half cup (cooked)   Kidney beans 7.4/half cup (cooked)   Lima beans 2.6/half cup (cooked)   Lentils 1.9/half cup (cooked)   Breads, pastas, and flours   Bagels 1.1/half bagel   Bran muffins 6.3/muffin    Cracked wheat 4.1/slice   Oatmeal 5.3/1 cup   Pumpernickel bread 1.0/slice   White bread 0.55/slice   Whole-wheat bread 1.66/slice   Pasta and rice cooked   Macaroni 1.0/1 cup (cooked)   Rice, brown 2.4/1 cup (cooked)   Rice, polished 0.6/1 cup (cooked)   Spaghetti (regular) 1.0/1 cup (cooked)   Flours and grains   Bran, oat 8.3/oz   Bran, wheat 12.4/oz   Rolled oats 13.7/1 cup (cooked)   Nuts   Almonds 3.6/half cup (slivered)   Peanuts 11.7/1 cup

## 2020-02-18 NOTE — PROGRESS NOTES
[facility] Consultation Note    Reason for Consult:  The primary encounter diagnosis was Constipation, unspecified constipation type. Diagnoses of Abdominal pain, unspecified abdominal location, Tenesmus, and Abnormal stools were also pertinent to this visit.    PCP:   Darlyn Carrion   [address] [facility] [address]    Referring MD:  Celsa Bazan Md  [address] [place] [address]    Initial History of Present Illness (HPI):  This is a 39 y.o. female here for evaluation of abd pain, back pain, constipation and diarrhea. New patient.  These symptoms flared about 3 weeks ago when she reports she had the flu. She has had hard stools with mucous, sometimes it can be a thin stool.  +Tenesmus.  Diarrhea one episode today, woke up with abd cramping. Not watery BM, but its just a small pieces, that are soft But this is not every day, maybe happened 4 days ago.   Typically has constipation  Abd pain , bloating and gas. Was diagnosed with IBS years ago By GI at outside facility  Abd us and and CT scans in the pas  Before this started she was having one soft BM every AM  +Nausea, no vomiting  Taking nexium every mornig for the past 5 days, has helped w/ throat burning but not with epigastric pain  GI bleeding - none  NSAID usage - uses ibuprofen occasionally. Does not use       ROS:  Constitutional: + fevers w/ flur, chills, No weight loss  ENT:  no dysphagia   CV: No chest pain, no palpitation  Pulm: No cough, No shortness of breath, no wheezing  Ophtho: No vision changes  GI: see HPI  Derm: No rash, no itching  Heme: No lymphadenopathy, No easy bruising  MSK: See HPI  : No dysuria, No hematuria  Neuro: No syncope, No seizure, no strokes  Psych: + anxiety, No uncontrolled depression    Medical History:  has a past medical history of IBS (irritable bowel syndrome).    Surgical History:  has a past surgical history that includes Dilation and curettage of uterus  "(2000).    Family History: family history includes No Known Problems in her father and mother..     Social History:  reports that she has never smoked. She has never used smokeless tobacco. She reports that she does not drink alcohol or use drugs.    Review of patient's allergies indicates:  No Known Allergies    Medication List with Changes/Refills   Current Medications    AMITRIPTYLINE (ELAVIL) 10 MG TABLET    Take 1 tablet (10 mg total) by mouth every evening.    DICLOFENAC (VOLTAREN) 75 MG EC TABLET    Take 1 tablet (75 mg total) by mouth 2 (two) times daily as needed.    ESOMEPRAZOLE (NEXIUM) 40 MG CAPSULE    Take 1 capsule (40 mg total) by mouth before breakfast.    LORATADINE (CLARITIN) 10 MG TABLET    Take 1 tablet (10 mg total) by mouth once daily.    PREDNISONE (DELTASONE) 10 MG TABLET    Take 2 po bid x 3 days, one bid x 3 days, then one daily till finish    PROMETHAZINE-DEXTROMETHORPHAN (PROMETHAZINE-DM) 6.25-15 MG/5 ML SYRP    Take one tsp po q 6 hrs prn cough    TIZANIDINE (ZANAFLEX) 2 MG TABLET    Take 1-2 tabs around bedtime. No alcohol, no driving with medication.   Discontinued Medications    PANTOPRAZOLE (PROTONIX) 40 MG TABLET    Take 1 tablet (40 mg total) by mouth once daily.         Objective Findings:    Vital Signs:  /72 (BP Location: Left arm)   Ht 5' 4" (1.626 m)   Wt 77.7 kg (171 lb 4.8 oz)   BMI 29.40 kg/m²   Body mass index is 29.4 kg/m².    Physical Exam:  General Appearance: Well appearing in no acute distress, tearful during visit today  Eyes:    No scleral icterus  ENT:  No lesions or masses   Lungs: CTA bilaterally, no wheezes, no rhonchi, no rales  Heart:  S1, S2 normal, no murmurs heard  Abdomen:  Non distended, soft, no guarding, no rebound, no tenderness, +epigastric tenderness  Musculoskeletal:  No major joint deformities  Skin: No petechiae or rash on exposed skin areas  Neurologic:  Alert and oriented x4  Psychiatric:  Normal speech mentation and " affect    Labs:  Lab Results   Component Value Date    WBC 9.43 01/30/2020    HGB 13.1 01/30/2020    HCT 39.2 01/30/2020     01/30/2020    CHOL 172 01/04/2019    TRIG 86 01/04/2019    HDL 69 01/04/2019    ALT 22 01/30/2020    AST 14 01/30/2020     01/30/2020    K 4.3 01/30/2020     01/30/2020    CREATININE 0.8 01/30/2020    BUN 12 01/30/2020    CO2 28 01/30/2020    TSH 1.316 01/04/2019    HGBA1C 5.0 01/04/2019           Imaging reviewed:  None  Endoscopy reviewed:    None  Medical Decision Making:    Assessment:    Venkatesh Pritchett is a 39 y.o. female here for:    1. Constipation, unspecified constipation type    2. Abdominal pain, unspecified abdominal location    3. Tenesmus    4. Abnormal stools      New pt. Sounds like she has an underlying diagnosis of IBS. She reports she was diagnosed w/ this a couple of years ago.  Today we discussed inflammation vs infection vs IBS.   Will treat as IBS if her work up is normal      Recommendations:  1. Labs and stool studies  2. Get CT scan scheduled ordered by PCP  3. We discussed EGD and colon are best diagnostic tool for her symptoms but pt is not ready to proceed with that today. She wants to wait for stool and labs to result.   4. Fiber supplement to help regulate stool    F/u pending below    Order summary:  Orders Placed This Encounter    Stool culture    H. PYLORI ANTIBODY, IGG    Tissue transglutaminase, IgA    IgA    CBC auto differential    Comprehensive metabolic panel    C-reactive protein    Sedimentation rate    WBC, Stool    Giardia / Cryptosporidum, EIA    Stool Exam-Ova,Cysts,Parasites    Calprotectin         Thank you for allowing me to participate in the care of Venkatesh Pritchett    Radha Enrique, CLAUDIA-GEORGE

## 2020-02-18 NOTE — LETTER
February 18, 2020      Celsa Bazan MD  [address]  [facility] [address]           [facility]  [address] [facility] [address]  Phone: [phone]          Patient: Venkatesh Pritchett   MR Number: [id number]   YOB: 1980   Date of Visit: 2/18/2020       Dear Dr. Celsa Bazan:    Thank you for referring Venkatesh Pritchett to me for evaluation. Attached you will find relevant portions of my assessment and plan of care.    If you have questions, please do not hesitate to call me. I look forward to following Venkatesh Pritchett along with you.    Sincerely,    Radha Enrique, NP    Enclosure  CC:  No Recipients    If you would like to receive this communication electronically, please contact [email or web address] or [phone] to request more information on [employer] Link access.    For providers and/or their staff who would like to refer a patient to [facility], please contact us through our one-stop-shop provider referral line, [facility], at [phone].    If you feel you have received this communication in error or would no longer like to receive these types of communications, please e-mail [email or web address]

## 2020-02-19 ENCOUNTER — TELEPHONE (OUTPATIENT)
Dept: GASTROENTEROLOGY | Facility: CLINIC | Age: 40
End: 2020-02-19

## 2020-02-19 ENCOUNTER — HOSPITAL ENCOUNTER (OUTPATIENT)
Dept: RADIOLOGY | Facility: HOSPITAL | Age: 40
Discharge: HOME | End: 2020-02-19
Attending: INTERNAL MEDICINE
Payer: COMMERCIAL

## 2020-02-19 DIAGNOSIS — R10.84 GENERALIZED ABDOMINAL PAIN: ICD-10-CM

## 2020-02-19 PROCEDURE — 74177 CT ABDOMEN PELVIS WITH CONTRAST: ICD-10-PCS | Mod: 26,,, | Performed by: RADIOLOGY

## 2020-02-19 PROCEDURE — 74177 CT ABD & PELVIS W/CONTRAST: CPT | Mod: 26,,, | Performed by: RADIOLOGY

## 2020-02-19 PROCEDURE — 25500020 PHARM REV CODE 255: Performed by: INTERNAL MEDICINE

## 2020-02-19 PROCEDURE — 74177 CT ABD & PELVIS W/CONTRAST: CPT | Mod: TC

## 2020-02-19 RX ADMIN — IOHEXOL 75 ML: 350 INJECTION, SOLUTION INTRAVENOUS at 04:02

## 2020-02-19 NOTE — TELEPHONE ENCOUNTER
MA contacted pt to give results per Radha , Labs look good besides the CRP that is elevated, but this is always elevated for her. Once the stool results can help determine if this is coming from her GI tract. Pt verbalized understanding and wanted to know was her CO2 and Mean C. Hemoglobin. MA explain to pt it was fine and if it was concerning Radha would have addressed it the , the only one that was elevated was the CRP which her stool would tell if it is coning from the GI tract , pt verbalized all understanding.       ----- Message from Kiana Vizcaino sent at 2/19/2020  9:34 AM CST -----  Returning call re: results    Pt contact [phone]

## 2020-02-19 NOTE — PROGRESS NOTES
Labs look good besides the CRP that is elevated, but this is always elevated for her. Once the stool results can help determine if this is coming from her GI tract

## 2020-02-19 NOTE — TELEPHONE ENCOUNTER
MA contacted pt to give results per Radha. Pt did not answer, A left a message to give the clinic a call.           ----- Message from Radha Enrique NP sent at 2/19/2020  9:03 AM CST -----  Labs look good besides the CRP that is elevated, but this is always elevated for her. Once the stool results can help determine if this is coming from her GI tract

## 2020-02-19 NOTE — TELEPHONE ENCOUNTER
MA contacted  Pt and explained to pt it would be sometime tomorrow when yola will read the results , she's gone for the day. Pt verbalized understanding.       ----- Message from Debbi Fairchild sent at 2/19/2020  4:04 PM CST -----  Pt is calling to inform TOMMY Enrique that she is finished with her CT Scan.  Call back [phone] or [phone]

## 2020-02-20 ENCOUNTER — TELEPHONE (OUTPATIENT)
Dept: GASTROENTEROLOGY | Facility: CLINIC | Age: 40
End: 2020-02-20

## 2020-02-20 ENCOUNTER — TELEPHONE (OUTPATIENT)
Dept: INTERNAL MEDICINE | Facility: CLINIC | Age: 40
End: 2020-02-20

## 2020-02-20 ENCOUNTER — LAB VISIT (OUTPATIENT)
Dept: LAB | Facility: HOSPITAL | Age: 40
End: 2020-02-20
Attending: NURSE PRACTITIONER
Payer: COMMERCIAL

## 2020-02-20 DIAGNOSIS — R10.9 ABDOMINAL PAIN, UNSPECIFIED ABDOMINAL LOCATION: ICD-10-CM

## 2020-02-20 LAB
TTG IGA SER-ACNC: 6 UNITS
WBC #/AREA STL HPF: NORMAL /[HPF]

## 2020-02-20 PROCEDURE — 83993 ASSAY FOR CALPROTECTIN FECAL: CPT

## 2020-02-20 PROCEDURE — 87045 FECES CULTURE AEROBIC BACT: CPT

## 2020-02-20 PROCEDURE — 87209 SMEAR COMPLEX STAIN: CPT

## 2020-02-20 PROCEDURE — 87046 STOOL CULTR AEROBIC BACT EA: CPT

## 2020-02-20 PROCEDURE — 89055 LEUKOCYTE ASSESSMENT FECAL: CPT

## 2020-02-20 PROCEDURE — 87427 SHIGA-LIKE TOXIN AG IA: CPT | Mod: 59

## 2020-02-20 PROCEDURE — 87329 GIARDIA AG IA: CPT

## 2020-02-20 RX ORDER — DICYCLOMINE HYDROCHLORIDE 10 MG/1
10 CAPSULE ORAL 3 TIMES DAILY PRN
Qty: 42 CAPSULE | Refills: 0 | Status: SHIPPED | OUTPATIENT
Start: 2020-02-20 | End: 2020-03-05

## 2020-02-20 NOTE — TELEPHONE ENCOUNTER
MA spoke to pt and told pt  sent in a prescription for Bentyl. Pt verbalized understanding to take as prescribed.         ----- Message from Kiana Vizcaino sent at 2/20/2020  1:59 PM CST -----  Pt having abdominal pain and wants to know what can she take for the pain    Pt contact [phone]

## 2020-02-20 NOTE — TELEPHONE ENCOUNTER
MA contacted pt and told pt we just seen her on 2/18 , that an appt right now would not be necessary being that she has not turned in her stool .Radha would not be able to give any new recommendations or further evaluate her until that stool has been turned in for processing. pt verbalized understanding and said her stool is loose . MA explained to pt that she can still turn it in and it does take a few days to result. Pt verbalized understanding. MA also told pt if her pain is a 7 or higher and she can not tolerate it, it is always recommended  to go to the nearest ER. Pt verbalized all understanding and thanked MA.         ----- Message from Kiana Vizcaino sent at 2/20/2020  9:20 AM CST -----  Calling to request to be seen today for abdominal pain    Pt [phone]

## 2020-02-20 NOTE — TELEPHONE ENCOUNTER
A contacted pt to let her know Dr. Bazan has to give results for CT scan, because he was the ordering provider. Pt verbalized understanding. MA will send a message to Dr. Bazan's staff box.

## 2020-02-20 NOTE — TELEPHONE ENCOUNTER
See previous encounter.        ----- Message from Sabrina Keenan MA sent at 2/20/2020  8:02 AM CST -----  Contact: [phone]  Pt had a ct scan yesterday that she would like Radha to look at and call her to discuss the results.  It was ordered by her PCP but said that Radha told her she would review it for her.

## 2020-02-20 NOTE — TELEPHONE ENCOUNTER
Spoke with patient, transferred her to Dr. Bazan's practice----- Message from Matt De La Cruz sent at 2/20/2020  4:16 PM CST -----  Contact: Patient [phone]  Patient would like to get test results.  Name of test (lab, mammo, etc.):  CT Scan  Date of test:  2/19/20  Ordering provider:   Where was the test performed:  [facility]  Comments:      Patient would like to get test results.  Name of test (lab, mammo, etc.):  Non fasting   Date of test:  2/20/20  Ordering provider:   Where was the test performed:  [facility]  Comments:      Please call an advise  Thank you

## 2020-02-21 LAB
CRYPTOSP AG STL QL IA: NEGATIVE
E COLI SXT1 STL QL IA: NEGATIVE
E COLI SXT2 STL QL IA: NEGATIVE
G LAMBLIA AG STL QL IA: NEGATIVE
O+P STL TRI STN: NORMAL

## 2020-02-21 NOTE — TELEPHONE ENCOUNTER
Informed patient of CT results, verbalized understanding----- Message from Caty Brady sent at 2/20/2020  4:50 PM CST -----  Contact: self   Calling to get test results.  Name of test (lab, xray, etc.):   CT scan  Date of test:  02/19  Ordering provider: LEYLA MARIE  Where was the test performed:  [facility]  Would the patient rather a call back or a response via [facility]?:  Call back  Comments:

## 2020-02-22 LAB — H PYLORI IGG SERPL QL IA: NEGATIVE

## 2020-02-24 ENCOUNTER — TELEPHONE (OUTPATIENT)
Dept: GASTROENTEROLOGY | Facility: CLINIC | Age: 40
End: 2020-02-24

## 2020-02-24 LAB — BACTERIA STL CULT: NORMAL

## 2020-02-24 NOTE — TELEPHONE ENCOUNTER
MA contacted pt and gave test results per Radha,   Hp and celiac labs normal . Pt said she saw stool results on portal , MA told pt her Calprotectin is still processing.           ----- Message from Radha Enrique NP sent at 2/24/2020  9:01 AM CST -----  Hp and celiac labs normal

## 2020-02-25 LAB — CALPROTECTIN STL-MCNT: <27.1 MCG/G

## 2020-02-26 ENCOUNTER — TELEPHONE (OUTPATIENT)
Dept: GASTROENTEROLOGY | Facility: CLINIC | Age: 40
End: 2020-02-26

## 2020-02-26 NOTE — TELEPHONE ENCOUNTER
MA contacted pt to give results per Radha, Calprotectin was normal, the likeli colindres of having IBD is very low, especially since her CT scan did not show bowel inflammation. This is likely IBS, but if pt wants to be evaluated further if she feels something is going on, then EGD and colonoscopy are the next step.         Pt did not want to proceed with getting scopes but wanted to know if she should continue her Nexium prescribed by PCP for acid reflux. Pt also wants to know what to take for her IBS.     ----- Message from Radha Enrique NP sent at 2/26/2020  9:08 AM CST -----  Calprotectin was normal, the likeli colindres of having IBD is very low, especially since her CT scan did not show bowel inflammation. This is likely IBS, but if pt wants to be evaluated further if she feels something is going on, then EGD and colonoscopy are the next step

## 2020-02-26 NOTE — PROGRESS NOTES
Calprotectin was normal, the likeli colindres of having IBD is very low, especially since her CT scan did not show bowel inflammation. This is likely IBS, but if pt wants to be evaluated further if she feels something is going on, then EGD and colonoscopy are the next step

## 2020-02-28 ENCOUNTER — TELEPHONE (OUTPATIENT)
Dept: GASTROENTEROLOGY | Facility: CLINIC | Age: 40
End: 2020-02-28

## 2020-02-28 NOTE — TELEPHONE ENCOUNTER
LVM re: Need to reschedule appointment with DARLYN Briones to appointment with TOMMY Enrique (pt previously saw TOMMY Enrique).    ----- Message from Charo Hobson RN sent at 2/14/2020  2:34 PM CST -----  03/06 1pm DARLYN Briones poss IBS Ab pain referral

## 2020-03-03 ENCOUNTER — TELEPHONE (OUTPATIENT)
Dept: INTERNAL MEDICINE | Facility: CLINIC | Age: 40
End: 2020-03-03

## 2020-03-03 NOTE — TELEPHONE ENCOUNTER
----- Message from Matt De La Cruz sent at 3/3/2020 11:53 AM CST -----  Contact: Patient [phone]  Patient would like to get medical advice.    Pharmacy name and phone #:  [id number] - [place] - [address] [facility] [phone] (Phone) [phone] (Fax    Comments: patient calling regarding vitamin D is low, would like to know what is needed to check the levels, call to inform.    Please call an advise  Thank you

## 2020-03-04 ENCOUNTER — OFFICE VISIT (OUTPATIENT)
Dept: INTERNAL MEDICINE | Facility: CLINIC | Age: 40
End: 2020-03-04
Payer: COMMERCIAL

## 2020-03-04 ENCOUNTER — LAB VISIT (OUTPATIENT)
Dept: LAB | Facility: HOSPITAL | Age: 40
End: 2020-03-04
Attending: INTERNAL MEDICINE
Payer: COMMERCIAL

## 2020-03-04 VITALS
WEIGHT: 171.94 LBS | TEMPERATURE: 98 F | HEIGHT: 64 IN | BODY MASS INDEX: 29.35 KG/M2 | SYSTOLIC BLOOD PRESSURE: 102 MMHG | DIASTOLIC BLOOD PRESSURE: 64 MMHG | HEART RATE: 80 BPM | OXYGEN SATURATION: 97 %

## 2020-03-04 DIAGNOSIS — G43.009 MIGRAINE WITHOUT AURA AND WITHOUT STATUS MIGRAINOSUS, NOT INTRACTABLE: ICD-10-CM

## 2020-03-04 DIAGNOSIS — R53.83 FATIGUE, UNSPECIFIED TYPE: Primary | ICD-10-CM

## 2020-03-04 DIAGNOSIS — R53.83 FATIGUE, UNSPECIFIED TYPE: ICD-10-CM

## 2020-03-04 DIAGNOSIS — K58.9 IRRITABLE BOWEL SYNDROME, UNSPECIFIED TYPE: ICD-10-CM

## 2020-03-04 DIAGNOSIS — E55.9 VITAMIN D INSUFFICIENCY: Primary | ICD-10-CM

## 2020-03-04 LAB
25(OH)D3+25(OH)D2 SERPL-MCNC: 13 NG/ML (ref 30–96)
ALBUMIN SERPL BCP-MCNC: 3.8 G/DL (ref 3.5–5.2)
ALP SERPL-CCNC: 86 U/L (ref 55–135)
ALT SERPL W/O P-5'-P-CCNC: 12 U/L (ref 10–44)
ANION GAP SERPL CALC-SCNC: 7 MMOL/L (ref 8–16)
AST SERPL-CCNC: 14 U/L (ref 10–40)
BILIRUB SERPL-MCNC: 1.1 MG/DL (ref 0.1–1)
BUN SERPL-MCNC: 12 MG/DL (ref 6–20)
CALCIUM SERPL-MCNC: 9.6 MG/DL (ref 8.7–10.5)
CHLORIDE SERPL-SCNC: 104 MMOL/L (ref 95–110)
CO2 SERPL-SCNC: 27 MMOL/L (ref 23–29)
CREAT SERPL-MCNC: 0.8 MG/DL (ref 0.5–1.4)
EST. GFR  (AFRICAN AMERICAN): >60 ML/MIN/1.73 M^2
EST. GFR  (NON AFRICAN AMERICAN): >60 ML/MIN/1.73 M^2
GLUCOSE SERPL-MCNC: 92 MG/DL (ref 70–110)
POTASSIUM SERPL-SCNC: 4.1 MMOL/L (ref 3.5–5.1)
PROT SERPL-MCNC: 7.7 G/DL (ref 6–8.4)
SODIUM SERPL-SCNC: 138 MMOL/L (ref 136–145)
TSH SERPL DL<=0.005 MIU/L-ACNC: 1.04 UIU/ML (ref 0.4–4)

## 2020-03-04 PROCEDURE — 99999 PR PBB SHADOW E&M-EST. PATIENT-LVL III: CPT | Mod: PBBFAC,,, | Performed by: NURSE PRACTITIONER

## 2020-03-04 PROCEDURE — 82306 VITAMIN D 25 HYDROXY: CPT

## 2020-03-04 PROCEDURE — 3008F PR BODY MASS INDEX (BMI) DOCUMENTED: ICD-10-PCS | Mod: CPTII,S$GLB,, | Performed by: NURSE PRACTITIONER

## 2020-03-04 PROCEDURE — 36415 COLL VENOUS BLD VENIPUNCTURE: CPT

## 2020-03-04 PROCEDURE — 80053 COMPREHEN METABOLIC PANEL: CPT

## 2020-03-04 PROCEDURE — 84443 ASSAY THYROID STIM HORMONE: CPT

## 2020-03-04 PROCEDURE — 3008F BODY MASS INDEX DOCD: CPT | Mod: CPTII,S$GLB,, | Performed by: NURSE PRACTITIONER

## 2020-03-04 PROCEDURE — 99999 PR PBB SHADOW E&M-EST. PATIENT-LVL III: ICD-10-PCS | Mod: PBBFAC,,, | Performed by: NURSE PRACTITIONER

## 2020-03-04 PROCEDURE — 99214 PR OFFICE/OUTPT VISIT, EST, LEVL IV, 30-39 MIN: ICD-10-PCS | Mod: S$GLB,,, | Performed by: NURSE PRACTITIONER

## 2020-03-04 PROCEDURE — 99214 OFFICE O/P EST MOD 30 MIN: CPT | Mod: S$GLB,,, | Performed by: NURSE PRACTITIONER

## 2020-03-04 RX ORDER — AMITRIPTYLINE HYDROCHLORIDE 10 MG/1
10 TABLET, FILM COATED ORAL NIGHTLY
Qty: 30 TABLET | Refills: 11 | Status: SHIPPED | OUTPATIENT
Start: 2020-03-04 | End: 2020-11-25 | Stop reason: SDUPTHER

## 2020-03-04 NOTE — TELEPHONE ENCOUNTER
----- Message from Yarely Mcdonnell sent at 3/4/2020  9:00 AM CST -----  Contact: [place]  [phone]  Patient  is calling to follow up on last message regards medication, please advise.

## 2020-03-04 NOTE — PROGRESS NOTES
Subjective:      Patient ID: Venkatesh Pritchett is a 39 y.o. female.    Chief Complaint: Fatigue (gen body aches and pain, would like to check Vitamin D level)    Ms Riddle is an established patient of Dr Carrion. She is new to me.    She  has a past medical history of IBS (irritable bowel syndrome).    She is here today to be evaluated for fatigue. She recently was seen by Gastro and worked up for IBS. She reports that after all her testing, she noticed that she was more tired than normal. CBC reviewed. Will add TSH, CMP, and Vitamin D level. Denies OTCs, bleeding from orifices or vomiting. Possible that she has not had adequate hydration. Previous vitamin D level was 12 in 2019. She does not take any over the counter supplements for this.     Discussed with patient hiatal hernia. Handout given with foods to avoid.     Refill request for Amitriptyline to treat Migraines.          Review of Systems   Constitutional: Positive for fatigue. Negative for chills, fever and unexpected weight change.   Respiratory: Negative for chest tightness, shortness of breath and wheezing.    Cardiovascular: Negative for chest pain, palpitations and leg swelling.   Gastrointestinal: Negative for blood in stool, diarrhea, nausea and vomiting.        Recent GI workup, IBS   Genitourinary: Negative for dysuria, frequency, hematuria and urgency.   Musculoskeletal: Negative for back pain, gait problem, joint swelling and myalgias.   Skin: Negative for color change, pallor, rash and wound.   Allergic/Immunologic: Negative for environmental allergies, food allergies and immunocompromised state.   Neurological: Positive for headaches (h/o, stable). Negative for dizziness, seizures and light-headedness.   Psychiatric/Behavioral: Negative for behavioral problems, confusion and decreased concentration. The patient is nervous/anxious.        Review of patient's allergies indicates:  No Known Allergies    Current Outpatient Medications   Medication  Sig Dispense Refill    dicyclomine (BENTYL) 10 MG capsule Take 1 capsule (10 mg total) by mouth 3 (three) times daily as needed (abdominal pain). 42 capsule 0    esomeprazole (NEXIUM) 40 MG capsule Take 1 capsule (40 mg total) by mouth before breakfast. 30 capsule 11    amitriptyline (ELAVIL) 10 MG tablet Take 1 tablet (10 mg total) by mouth every evening. 30 tablet 11    diclofenac (VOLTAREN) 75 MG EC tablet Take 1 tablet (75 mg total) by mouth 2 (two) times daily as needed. (Patient not taking: Reported on 2/18/2020) 40 tablet 2    tiZANidine (ZANAFLEX) 2 MG tablet Take 1-2 tabs around bedtime. No alcohol, no driving with medication. (Patient not taking: Reported on 2/18/2020) 60 tablet 11     No current facility-administered medications for this visit.        Patient Active Problem List    Diagnosis Date Noted    Insufficiency of tear film of both eyes 12/31/2018    Strain of left pectoralis muscle 07/28/2017    Conjunctivitis 02/06/2015    IBS (irritable bowel syndrome) 09/05/2014       Past Medical History:   Diagnosis Date    IBS (irritable bowel syndrome)        Past Surgical History:   Procedure Laterality Date    DILATION AND CURETTAGE OF UTERUS  2000    MAB       Family History   Problem Relation Age of Onset    No Known Problems Father     No Known Problems Mother     Hypertension Neg Hx     Colon cancer Neg Hx     Amblyopia Neg Hx     Blindness Neg Hx     Cataracts Neg Hx     Glaucoma Neg Hx     Macular degeneration Neg Hx     Retinal detachment Neg Hx     Strabismus Neg Hx     Breast cancer Neg Hx     Ovarian cancer Neg Hx     Celiac disease Neg Hx     Esophageal cancer Neg Hx     Rectal cancer Neg Hx     Stomach cancer Neg Hx     Liver disease Neg Hx     Liver cancer Neg Hx          Objective:     Lab Results   Component Value Date    WBC 9.65 02/18/2020    HGB 12.4 02/18/2020    HCT 40.8 02/18/2020     02/18/2020    CHOL 172 01/04/2019    TRIG 86 01/04/2019    HDL  "69 01/04/2019    ALT 10 02/18/2020    AST 12 02/18/2020     02/18/2020    K 4.4 02/18/2020     02/18/2020    CREATININE 0.8 02/18/2020    BUN 7 02/18/2020    CO2 22 (L) 02/18/2020    TSH 1.316 01/04/2019    HGBA1C 5.0 01/04/2019       Vitals:    03/04/20 1101   BP: 102/64   Pulse: 80   Temp: 97.9 °F (36.6 °C)   TempSrc: Oral   SpO2: 97%   Weight: 78 kg (171 lb 15.3 oz)   Height: 5' 4" (1.626 m)       Body mass index is 29.52 kg/m².    Physical Exam   Constitutional: She is oriented to person, place, and time. She appears well-developed and well-nourished.   HENT:   Head: Normocephalic and atraumatic.   Nose: Nose normal.   Eyes: Conjunctivae and EOM are normal.   Neck: Normal range of motion. Neck supple. No JVD present. No tracheal deviation present. No thyromegaly present.   Cardiovascular: Normal rate, regular rhythm, normal heart sounds and intact distal pulses.   No murmur heard.  Pulmonary/Chest: Effort normal and breath sounds normal. No respiratory distress. She has no wheezes.   Musculoskeletal: Normal range of motion.   Neurological: She is alert and oriented to person, place, and time.   Skin: Skin is warm and dry.   Psychiatric: Her behavior is normal. Judgment and thought content normal. Her mood appears anxious.   Nursing note and vitals reviewed.    Assessment:     1. Fatigue, unspecified type    2. Migraine without aura and without status migrainosus, not intractable    3. Irritable bowel syndrome, unspecified type      Plan:     Venkatesh was seen today for fatigue.    Diagnoses and all orders for this visit:    Fatigue, unspecified type  -     Vitamin D; Future  -     TSH; Future  -     Comprehensive metabolic panel; Future  -     CBC auto differential; Future    Migraine without aura and without status migrainosus, not intractable  -     amitriptyline (ELAVIL) 10 MG tablet; Take 1 tablet (10 mg total) by mouth every evening.    Irritable bowel syndrome, unspecified type  Chronic. Continue " regimen. Followed by Gastroenterology.    Other orders  -     Cancel: CBC auto differential; Future          Health Maintenance   Topic Date Due    Pap Smear with HPV Cotest  05/11/2018    Mammogram  10/07/2018    TETANUS VACCINE  03/23/2025    Lipid Panel  Completed       Patient Instructions     Call if symptoms worsen or fail to improve.  Try the dietary modifications discussed below.    What Is a Hiatal Hernia?    Hiatal hernia is when the area where the stomach and esophagus meet bulges up through the diaphragm into the chest cavity. In some cases, part of the stomach may bulge above the diaphragm. Stomach acid may move up into the esophagus and cause symptoms. The symptoms are often blamed on gastroesophageal reflux disease (GERD). You may only know about the hernia when it shows up on an X-ray taken for other reasons.   What you may feel  The hiatus is a normal hole in the diaphragm. The esophagus passes through this hole and leads to the stomach. In some cases, part of the stomach may bulge above the diaphragm. This bulge is called a hernia. Stomach acid may move up into the esophagus and cause symptoms.  When you eat, the muscle at the hiatus relaxes to allow food to pass into the stomach. It tightens again to keep food and digestive acids in the stomach.  Many people with hiatal hernias have mild symptoms. You may notice the following GERD symptoms:  · Heartburn or other chest discomfort  · A feeling of chest fullness after a meal  · Frequent burping  · Acid taste in the mouth  · Trouble swallowing  Treating symptoms  If you have been diagnosed with hiatal hernia, these suggestions may help improve symptoms:  · Lose excess weight. Extra weight puts pressure on the stomach and esophagus.  · Dont lie down after eating. Sit up for at least an hour after eating. Lying down after eating can increase symptoms.  · Avoid certain foods and drinks. These include fatty foods, chocolate, coffee, mint, and other  foods that cause symptoms for you.  · Dont smoke or drink alcohol. These can worsen symptoms.  · Look at your medicines. Discuss your medicines with your healthcare provider. Many medicines can cause symptoms.  · Consider an antacid medicine. Ask your healthcare provider about over-the-counter and prescription medicines that may help.  · Ask about surgery, if needed. Surgery is a treatment choice for some people. Your healthcare provider can determine if surgery is an option for you.    Date Last Reviewed: 10/1/2016  © 7830-7789 [employer]. [address] [place] [address], [place] [address]. All rights reserved. This information is not intended as a substitute for professional medical care. Always follow your healthcare professional's instructions.        Lifestyle Changes for Controlling GERD  When you have GERD, stomach acid feels as if its backing up toward your mouth. Whether or not you take medicine to control your GERD, your symptoms can often be improved with lifestyle changes. Talk to your healthcare provider about the following suggestions. These suggestions may help you get relief from your symptoms.      Raise your head  Reflux is more likely to strike when youre lying down flat, because stomach fluid can flow backward more easily. Raising the head of your bed 4 to 6 inches can help. To do this:  · Slide blocks or books under the legs at the head of your bed. Or, place a wedge under the mattress. Many [employer] can make a suitable wedge for you. The wedge should run from your waist to the top of your head.  · Dont just prop your head on several pillows. This increases pressure on your stomach. It can make GERD worse.  Watch your eating habits  Certain foods may increase the acid in your stomach or relax the lower esophageal sphincter. This makes GERD more likely. Its best to avoid the following if they cause you symptoms:  · Coffee, tea, and carbonated drinks (with and without  caffeine)  · Fatty, fried, or spicy food  · Mint, chocolate, onions, and tomatoes  · Peppermint  · Any other foods that seem to irritate your stomach or cause you pain  Relieve the pressure  Tips include the following:  · Eat smaller meals, even if you have to eat more often.  · Dont lie down right after you eat. Wait a few hours for your stomach to empty.  · Avoid tight belts and tight-fitting clothes.  · Lose excess weight.  Tobacco and alcohol  Avoid smoking tobacco and drinking alcohol. They can make GERD symptoms worse.  Date Last Reviewed: 7/1/2016  © 5805-3291 [employer]. [address] [place] [address], [place] [address]. All rights reserved. This information is not intended as a substitute for professional medical care. Always follow your healthcare professional's instructions.

## 2020-08-20 ENCOUNTER — PATIENT OUTREACH (OUTPATIENT)
Dept: ADMINISTRATIVE | Facility: HOSPITAL | Age: 40
End: 2020-08-20

## 2020-08-20 DIAGNOSIS — Z11.59 NEED FOR HEPATITIS C SCREENING TEST: Primary | ICD-10-CM

## 2020-09-03 ENCOUNTER — OFFICE VISIT (OUTPATIENT)
Dept: INTERNAL MEDICINE | Facility: CLINIC | Age: 40
End: 2020-09-03
Payer: COMMERCIAL

## 2020-09-03 VITALS
OXYGEN SATURATION: 98 % | BODY MASS INDEX: 29.96 KG/M2 | HEART RATE: 108 BPM | HEIGHT: 64 IN | DIASTOLIC BLOOD PRESSURE: 62 MMHG | WEIGHT: 175.5 LBS | SYSTOLIC BLOOD PRESSURE: 100 MMHG | TEMPERATURE: 98 F

## 2020-09-03 DIAGNOSIS — R10.11 RUQ PAIN: ICD-10-CM

## 2020-09-03 DIAGNOSIS — K21.9 GASTROESOPHAGEAL REFLUX DISEASE, ESOPHAGITIS PRESENCE NOT SPECIFIED: Primary | ICD-10-CM

## 2020-09-03 PROCEDURE — 3008F BODY MASS INDEX DOCD: CPT | Mod: CPTII,S$GLB,, | Performed by: INTERNAL MEDICINE

## 2020-09-03 PROCEDURE — 99214 OFFICE O/P EST MOD 30 MIN: CPT | Mod: S$GLB,,, | Performed by: INTERNAL MEDICINE

## 2020-09-03 PROCEDURE — 3008F PR BODY MASS INDEX (BMI) DOCUMENTED: ICD-10-PCS | Mod: CPTII,S$GLB,, | Performed by: INTERNAL MEDICINE

## 2020-09-03 PROCEDURE — 99214 PR OFFICE/OUTPT VISIT, EST, LEVL IV, 30-39 MIN: ICD-10-PCS | Mod: S$GLB,,, | Performed by: INTERNAL MEDICINE

## 2020-09-03 PROCEDURE — 99999 PR PBB SHADOW E&M-EST. PATIENT-LVL IV: ICD-10-PCS | Mod: PBBFAC,,, | Performed by: INTERNAL MEDICINE

## 2020-09-03 PROCEDURE — 99999 PR PBB SHADOW E&M-EST. PATIENT-LVL IV: CPT | Mod: PBBFAC,,, | Performed by: INTERNAL MEDICINE

## 2020-09-03 RX ORDER — PANTOPRAZOLE SODIUM 40 MG/1
40 TABLET, DELAYED RELEASE ORAL DAILY
Qty: 90 TABLET | Refills: 3 | Status: SHIPPED | OUTPATIENT
Start: 2020-09-03 | End: 2020-11-09

## 2020-09-03 RX ORDER — FLUOXETINE 10 MG/1
10 CAPSULE ORAL DAILY
Qty: 90 CAPSULE | Refills: 3 | Status: SHIPPED | OUTPATIENT
Start: 2020-09-03 | End: 2020-12-14

## 2020-09-03 NOTE — PROGRESS NOTES
Subjective:       Patient ID: Venkatesh Pritchett is a 39 y.o. female.    Chief Complaint: Annual Exam    HPI Pt is having heart burn symptoms - nexium helped but she stopped it after the month.  She is also having some RUQ discomfort that goes into her shoulder and chest sometimes.  She plans pregnancy next year.  She has lot of anxiety and took fluoxetine in the past and it was helpful.  Has physical symptoms fatigue, joint pain when she is anxious.  Review of Systems   Respiratory: Negative for shortness of breath (PND or orthopnea).    Cardiovascular: Negative for chest pain (arm pain or jaw pain).   Gastrointestinal: Negative for abdominal pain, diarrhea, nausea and vomiting.   Genitourinary: Negative for dysuria.   Neurological: Negative for seizures, syncope and headaches.       Objective:      Physical Exam  Constitutional:       General: She is not in acute distress.     Appearance: She is well-developed.   HENT:      Head: Normocephalic.   Neck:      Musculoskeletal: Neck supple.      Thyroid: No thyromegaly.      Vascular: No JVD.   Cardiovascular:      Rate and Rhythm: Normal rate and regular rhythm.      Heart sounds: Normal heart sounds. No murmur. No friction rub. No gallop.    Pulmonary:      Effort: Pulmonary effort is normal.      Breath sounds: Normal breath sounds. No wheezing or rales.   Abdominal:      General: Bowel sounds are normal. There is no distension.      Palpations: Abdomen is soft. There is no mass.      Tenderness: There is no abdominal tenderness. There is no guarding or rebound.   Lymphadenopathy:      Cervical: No cervical adenopathy.   Skin:     General: Skin is warm and dry.   Neurological:      Mental Status: She is alert and oriented to person, place, and time.      Deep Tendon Reflexes: Reflexes are normal and symmetric.   Psychiatric:         Behavior: Behavior normal.         Thought Content: Thought content normal.         Judgment: Judgment normal.         Assessment:       1.  Gastroesophageal reflux disease, esophagitis presence not specified    2. RUQ pain        Plan:   Gastroesophageal reflux disease, esophagitis presence not specified  -     Case request GI: ESOPHAGOGASTRODUODENOSCOPY (EGD)    RUQ pain  -     US Abdomen Limited_Gallbladder; Future; Expected date: 09/03/2020    Other orders  -     pantoprazole (PROTONIX) 40 MG tablet; Take 1 tablet (40 mg total) by mouth once daily.  Dispense: 90 tablet; Refill: 3  -     FLUoxetine 10 MG capsule; Take 1 capsule (10 mg total) by mouth once daily.  Dispense: 90 capsule; Refill: 3  -     prenatal vit86-iron-folic acid 32-1,000 mg-mcg Tab; One daily  Dispense: 90 tablet; Refill: 3

## 2020-10-04 ENCOUNTER — OFFICE VISIT (OUTPATIENT)
Dept: URGENT CARE | Facility: CLINIC | Age: 40
End: 2020-10-04
Payer: COMMERCIAL

## 2020-10-04 VITALS
TEMPERATURE: 98 F | DIASTOLIC BLOOD PRESSURE: 78 MMHG | OXYGEN SATURATION: 98 % | HEIGHT: 64 IN | HEART RATE: 88 BPM | SYSTOLIC BLOOD PRESSURE: 116 MMHG | BODY MASS INDEX: 30.39 KG/M2 | WEIGHT: 178 LBS

## 2020-10-04 DIAGNOSIS — S92.354A CLOSED NONDISPLACED FRACTURE OF FIFTH METATARSAL BONE OF RIGHT FOOT, INITIAL ENCOUNTER: Primary | ICD-10-CM

## 2020-10-04 DIAGNOSIS — M79.671 RIGHT FOOT PAIN: ICD-10-CM

## 2020-10-04 PROCEDURE — 73630 X-RAY EXAM OF FOOT: CPT | Mod: FY,RT,S$GLB, | Performed by: RADIOLOGY

## 2020-10-04 PROCEDURE — 99214 PR OFFICE/OUTPT VISIT, EST, LEVL IV, 30-39 MIN: ICD-10-PCS | Mod: S$GLB,,, | Performed by: PHYSICIAN ASSISTANT

## 2020-10-04 PROCEDURE — 99214 OFFICE O/P EST MOD 30 MIN: CPT | Mod: S$GLB,,, | Performed by: PHYSICIAN ASSISTANT

## 2020-10-04 PROCEDURE — 73630 XR FOOT COMPLETE 3 VIEW RIGHT: ICD-10-PCS | Mod: FY,RT,S$GLB, | Performed by: RADIOLOGY

## 2020-10-04 NOTE — PROGRESS NOTES
"Subjective:       Patient ID: Venkatesh Pritchett is a 39 y.o. female.    Vitals:  height is 5' 4" (1.626 m) and weight is 80.7 kg (178 lb). Her temperature is 97.9 °F (36.6 °C). Her blood pressure is 116/78 and her pulse is 88. Her oxygen saturation is 98%.     Chief Complaint: Fall    Ms. Pritchett presents for evaluation of right foot pain.  Three days ago, she was jumping up and came down on her foot wrong.  She has had pain since that time that is much worse with walking, almost relieved with rest.  She denies any paresthesias.  She has been elevating and icing the foot with some relief.    Fall  The accident occurred 3 to 5 days ago (3 days). Fall occurred: while jumping up. She landed on carpet. There was no blood loss. Point of impact: right foot. Pain location: right foot. The pain is at a severity of 8/10. The pain is moderate. Exacerbated by: walking. Pertinent negatives include no abdominal pain, bowel incontinence, fever, headaches, hearing loss, hematuria, loss of consciousness, nausea, numbness, tingling, visual change or vomiting. Associated symptoms comments: Swollen and pain on right foot. She has tried nothing for the symptoms.       Constitution: Negative for fatigue and fever.   HENT: Negative for facial swelling and facial trauma.    Neck: Negative for neck stiffness.   Cardiovascular: Negative for chest trauma, chest pain, leg swelling, palpitations, sob on exertion and passing out.   Eyes: Negative for eye trauma, double vision and blurred vision.   Respiratory: Negative for cough and shortness of breath.    Gastrointestinal: Negative for abdominal trauma, abdominal pain, nausea, vomiting, rectal bleeding and bowel incontinence.   Genitourinary: Negative for hematuria, missed menses, genital trauma and pelvic pain.   Musculoskeletal: Positive for pain, trauma, joint pain and joint swelling. Negative for abnormal ROM of joint, muscle cramps and muscle ache.   Skin: Negative for color change, wound, " abrasion, laceration and bruising.   Neurological: Negative for dizziness, history of vertigo, light-headedness, passing out, facial drooping, speech difficulty, coordination disturbances, loss of balance, headaches, altered mental status, loss of consciousness and numbness.   Hematologic/Lymphatic: Negative for history of bleeding disorder.   Psychiatric/Behavioral: Negative for altered mental status.       Objective:      Physical Exam   Constitutional: She is oriented to person, place, and time. She appears well-developed. She is cooperative.  Non-toxic appearance. She does not appear ill. No distress.   HENT:   Head: Normocephalic and atraumatic.   Ears:   Right Ear: Hearing and external ear normal.   Left Ear: Hearing and external ear normal.   Nose: Nose normal. No rhinorrhea or nasal deformity. No epistaxis.   Mouth/Throat: Mucous membranes are normal. No tonsillar exudate.   Eyes: Conjunctivae and lids are normal. Right eye exhibits no discharge. Left eye exhibits no discharge. No scleral icterus.   Neck: Trachea normal, normal range of motion, full passive range of motion without pain and phonation normal. Neck supple.   Cardiovascular: Normal rate, regular rhythm, normal heart sounds and normal pulses.   Pulmonary/Chest: Effort normal and breath sounds normal. No respiratory distress.   Abdominal: Soft. Normal appearance and bowel sounds are normal. She exhibits no distension, no pulsatile midline mass and no mass. There is no abdominal tenderness.   Musculoskeletal: Normal range of motion.         General: No deformity.      Right foot: Normal range of motion and normal capillary refill. Tenderness and bony tenderness present. No swelling, crepitus, deformity or laceration.        Feet:       Comments: Right 5th MT with edema & ecchymosis.  Full ROM toe & ankle.  No TTP throughout the remainder of the foot or ankle.  DP & PT pulse 2+, sensation intact.    Neurological: She is alert and oriented to person,  place, and time. She exhibits normal muscle tone. Coordination normal.   Skin: Skin is warm, dry, intact, not diaphoretic and not pale. not right footPsychiatric: Her speech is normal and behavior is normal. Judgment and thought content normal.   Nursing note and vitals reviewed.      XR R foot - Acute mildly displaced fracture of the 5th metatarsal shaft.    Assessment:       1. Closed nondisplaced fracture of fifth metatarsal bone of right foot, initial encounter    2. Right foot pain        Plan:         Closed nondisplaced fracture of fifth metatarsal bone of right foot, initial encounter  -     NON-PNEUMATIC WALKING BOOT FOR HOME USE  -     Ambulatory referral/consult to Orthopedics  -     CRUTCHES FOR HOME USE    Right foot pain  -     X-Ray Foot Complete 3 view Right; Future; Expected date: 10/04/2020    Patient placed in walking boot with crutches & told NWB until ortho appt.  Orthopedics referral placed.    Diagnoses and plan discussed with the patient, as well as the expected course and duration of her symptoms. All questions and concerns were addressed prior to discharge.  She was advised to follow up with her PCP within 1 week if symptoms do not improve. Emergency department precautions were given. Patient verbalized understanding and was happy with the plan of care.     Patient Instructions     PLEASE READ YOUR DISCHARGE INSTRUCTIONS ENTIRELY AS IT CONTAINS IMPORTANT INFORMATION.  A referral to orthopedics was placed for you.  You should receive a phone call in the next few days to schedule an appt.  Please call [phone] to schedule an appt if have not gotten a phone call in the next few days.  - Rest.    - Drink plenty of fluids.    - Tylenol or Ibuprofen as directed as needed for fever/pain.    - If you were prescribed antibiotics, please take them to completion.  - If you are female and on birth control pills - please use additional methods of contraception to prevent pregnancy while on  antibiotics and for one cycle after.   - If you were prescribed a narcotic medication or muscle relaxer, do not drive or operate heavy equipment or machinery while taking these medications, as they can cause drowsiness.   - If you smoke, please stop smoking.  -You must understand that you've received an Urgent Care treatment only and that you may be released before all your medical problems are known or treated. You, the patient, will    arrange for follow up care as instructed. Please arrange follow up with your primary medical clinic as soon as possible.   - Follow up with your PCP or specialty clinic as directed in the next 1-2 weeks if not improved or as needed.  You can call [phone] to schedule an appointment with the appropriate provider.    - Please return to Urgent Care or to the Emergency Department if your symptoms worsen.    Patient aware and verbalized understanding.    Understanding Fifth Metatarsal Fracture    A fifth metatarsal fracture is a type of broken bone in your foot. You have 5 metatarsals. They are the middle bones in your feet, between your toes and your anklebones (tarsals). The fifth metatarsal connects your smallest toe to your ankle. These bones help with arch support and balance.     How to say it  met--TAHR-sal   What causes a fifth metatarsal fracture?  A direct blow to the bone is often the cause of a fracture of the fifth metatarsal. That may happen if you drop a heavy object on your foot or land wrong on your foot or ankle. Twisting activities can also break the bone. Pivoting while playing basketball is one example.  Repeatedly placing too much stress on the bone can also cause a fracture of the fifth metatarsal. This is called a stress fracture. People who do physical activities like dancing or running tend to be more prone to stress fractures.  Symptoms of a fifth metatarsal fracture  Sudden pain along the outside of your foot is the main symptom. A stress fracture may  develop more slowly. You may feel chronic pain for a period of time. Your foot may also swell up and bruise. You may have trouble walking.  Treatment for a fifth metatarsal fracture  Treatment for this type of fracture depends on where the bone is broken and how severe the breakage is. Healing can take up to several months. Treatment may include:  · Cold therapy. Putting ice on the area may reduce swelling and pain, especially in the first few days after injury.  · Elevation. Propping up the foot so it is above the level of your heart may ease swelling.  · Prescription or over-the-counter pain medicines. These help reduce pain and swelling.  · Immobilization. Devices such as a splint, cast, or walking boot can protect the bone and ease pain. They can help keep the bone in place so it heals properly. You may need to avoid putting any weight on the broken bone for a period of time. Severe fractures usually need a longer limit on weight-bearing activities.  · Stretching and strengthening exercises. Certain exercises can help you regain flexibility and strength in your foot.  · Surgery. You usually will not need surgery. But you may need it if the bone is broken into 2 or more pieces and is not aligned (displaced), doesnt heal properly, or takes a long time to heal.  Possible complications of a fifth metatarsal fracture  · The bone doesnt heal correctly  · Acute compartment syndrome. This is when pressure builds up in the muscles of the foot and affects blood flow.     When to call your healthcare provider  Call your healthcare provider right away if you have any of these:  · Fever of 100.4°F (38°C) or higher, or as directed  · Symptoms that dont get better, or get worse  · Numbness or coldness in your foot  · Toe nails that turn blue or grey in color  · New symptoms   Date Last Reviewed: 3/10/2016  © 3091-6127 "[employer]. [address] [place] [address], [place] [address]. All rights reserved. This information  is not intended as a substitute for professional medical care. Always follow your healthcare professional's instructions.

## 2020-10-04 NOTE — PATIENT INSTRUCTIONS
PLEASE READ YOUR DISCHARGE INSTRUCTIONS ENTIRELY AS IT CONTAINS IMPORTANT INFORMATION.  A referral to orthopedics was placed for you.  You should receive a phone call in the next few days to schedule an appt.  Please call [phone] to schedule an appt if have not gotten a phone call in the next few days.  - Rest.    - Drink plenty of fluids.    - Tylenol or Ibuprofen as directed as needed for fever/pain.    - If you were prescribed antibiotics, please take them to completion.  - If you are female and on birth control pills - please use additional methods of contraception to prevent pregnancy while on antibiotics and for one cycle after.   - If you were prescribed a narcotic medication or muscle relaxer, do not drive or operate heavy equipment or machinery while taking these medications, as they can cause drowsiness.   - If you smoke, please stop smoking.  -You must understand that you've received an Urgent Care treatment only and that you may be released before all your medical problems are known or treated. You, the patient, will    arrange for follow up care as instructed. Please arrange follow up with your primary medical clinic as soon as possible.   - Follow up with your PCP or specialty clinic as directed in the next 1-2 weeks if not improved or as needed.  You can call [phone] to schedule an appointment with the appropriate provider.    - Please return to Urgent Care or to the Emergency Department if your symptoms worsen.    Patient aware and verbalized understanding.    Understanding Fifth Metatarsal Fracture    A fifth metatarsal fracture is a type of broken bone in your foot. You have 5 metatarsals. They are the middle bones in your feet, between your toes and your anklebones (tarsals). The fifth metatarsal connects your smallest toe to your ankle. These bones help with arch support and balance.     How to say it  met-ah-TAHR-sal   What causes a fifth metatarsal fracture?  A direct blow to the  bone is often the cause of a fracture of the fifth metatarsal. That may happen if you drop a heavy object on your foot or land wrong on your foot or ankle. Twisting activities can also break the bone. Pivoting while playing basketball is one example.  Repeatedly placing too much stress on the bone can also cause a fracture of the fifth metatarsal. This is called a stress fracture. People who do physical activities like dancing or running tend to be more prone to stress fractures.  Symptoms of a fifth metatarsal fracture  Sudden pain along the outside of your foot is the main symptom. A stress fracture may develop more slowly. You may feel chronic pain for a period of time. Your foot may also swell up and bruise. You may have trouble walking.  Treatment for a fifth metatarsal fracture  Treatment for this type of fracture depends on where the bone is broken and how severe the breakage is. Healing can take up to several months. Treatment may include:  · Cold therapy. Putting ice on the area may reduce swelling and pain, especially in the first few days after injury.  · Elevation. Propping up the foot so it is above the level of your heart may ease swelling.  · Prescription or over-the-counter pain medicines. These help reduce pain and swelling.  · Immobilization. Devices such as a splint, cast, or walking boot can protect the bone and ease pain. They can help keep the bone in place so it heals properly. You may need to avoid putting any weight on the broken bone for a period of time. Severe fractures usually need a longer limit on weight-bearing activities.  · Stretching and strengthening exercises. Certain exercises can help you regain flexibility and strength in your foot.  · Surgery. You usually will not need surgery. But you may need it if the bone is broken into 2 or more pieces and is not aligned (displaced), doesnt heal properly, or takes a long time to heal.  Possible complications of a fifth metatarsal  fracture  · The bone doesnt heal correctly  · Acute compartment syndrome. This is when pressure builds up in the muscles of the foot and affects blood flow.     When to call your healthcare provider  Call your healthcare provider right away if you have any of these:  · Fever of 100.4°F (38°C) or higher, or as directed  · Symptoms that dont get better, or get worse  · Numbness or coldness in your foot  · Toe nails that turn blue or grey in color  · New symptoms   Date Last Reviewed: 3/10/2016  © 8055-5247 [employer]. [address] [place] [address]. All rights reserved. This information is not intended as a substitute for professional medical care. Always follow your healthcare professional's instructions.         Mercedes Flap Text: The defect edges were debeveled with a #15 scalpel blade.  Given the location of the defect, shape of the defect and the proximity to free margins a Mercedes flap was deemed most appropriate.  Using a sterile surgical marker, an appropriate advancement flap was drawn incorporating the defect and placing the expected incisions within the relaxed skin tension lines where possible. The area thus outlined was incised deep to adipose tissue with a #15 scalpel blade.  The skin margins were undermined to an appropriate distance in all directions utilizing iris scissors.

## 2020-10-05 ENCOUNTER — OFFICE VISIT (OUTPATIENT)
Dept: ORTHOPEDICS | Facility: CLINIC | Age: 40
End: 2020-10-05
Payer: COMMERCIAL

## 2020-10-05 VITALS
TEMPERATURE: 97 F | RESPIRATION RATE: 18 BRPM | SYSTOLIC BLOOD PRESSURE: 93 MMHG | DIASTOLIC BLOOD PRESSURE: 64 MMHG | HEART RATE: 82 BPM

## 2020-10-05 DIAGNOSIS — R79.89 LOW VITAMIN D LEVEL: ICD-10-CM

## 2020-10-05 DIAGNOSIS — S92.351A CLOSED DISPLACED FRACTURE OF FIFTH METATARSAL BONE OF RIGHT FOOT, INITIAL ENCOUNTER: Primary | ICD-10-CM

## 2020-10-05 PROCEDURE — 99999 PR PBB SHADOW E&M-EST. PATIENT-LVL III: CPT | Mod: PBBFAC,,, | Performed by: NURSE PRACTITIONER

## 2020-10-05 PROCEDURE — 99203 PR OFFICE/OUTPT VISIT, NEW, LEVL III, 30-44 MIN: ICD-10-PCS | Mod: S$GLB,,, | Performed by: NURSE PRACTITIONER

## 2020-10-05 PROCEDURE — 99999 PR PBB SHADOW E&M-EST. PATIENT-LVL III: ICD-10-PCS | Mod: PBBFAC,,, | Performed by: NURSE PRACTITIONER

## 2020-10-05 PROCEDURE — 99203 OFFICE O/P NEW LOW 30 MIN: CPT | Mod: S$GLB,,, | Performed by: NURSE PRACTITIONER

## 2020-10-05 NOTE — PROGRESS NOTES
SUBJECTIVE:     Chief Complaint & History of Present Illness:  Venkatesh Pritchett is a New 39 y.o. year old female patient here for constant right foot/ankle pain which has been present for 3 days.  There is a history of injury.  Patient states she was jumping trying to kill a cockroach and when she came down she landed on her son's toy and fell down.  She states she had pain to her right foot and went to urgent care yesterday when the pain did not improved.  At urgent care, x-rays were done, she has a 5th metatarsal fracture (oblique) with mild displacement.  She was placed into a CAM boot, given crutches and told NWB until evaluated by Ortho.      Patient presents with her spouse today, she reports pain to lateral aspect of her foot only when the area is touched.  She reports she has been walking on her heel and has not always used her crutches.  She endorses history of low vitamin d levels and is currently using 2000 IU of vitamin D supplement daily.  She denies numbness or tingling sensation.  No reported fever or chills.    Review of patient's allergies indicates:  No Known Allergies      Current Outpatient Medications   Medication Sig Dispense Refill    amitriptyline (ELAVIL) 10 MG tablet Take 1 tablet (10 mg total) by mouth every evening. (Patient not taking: Reported on 10/4/2020) 30 tablet 11    esomeprazole (NEXIUM) 40 MG capsule Take 1 capsule (40 mg total) by mouth before breakfast. 30 capsule 11    FLUoxetine 10 MG capsule Take 1 capsule (10 mg total) by mouth once daily. (Patient not taking: Reported on 10/4/2020) 90 capsule 3    pantoprazole (PROTONIX) 40 MG tablet Take 1 tablet (40 mg total) by mouth once daily. (Patient not taking: Reported on 10/4/2020) 90 tablet 3    prenatal vit86-iron-folic acid 32-1,000 mg-mcg Tab One daily (Patient not taking: Reported on 10/4/2020) 90 tablet 3    tiZANidine (ZANAFLEX) 2 MG tablet Take 1-2 tabs around bedtime. No alcohol, no driving with medication.  (Patient not taking: Reported on 2/18/2020) 60 tablet 11     No current facility-administered medications for this visit.        Past Medical History:   Diagnosis Date    IBS (irritable bowel syndrome)        Past Surgical History:   Procedure Laterality Date    DILATION AND CURETTAGE OF UTERUS  2000    [facility]AB       Family History   Problem Relation Age of Onset    No Known Problems Father     No Known Problems Mother     Hypertension Neg Hx     Colon cancer Neg Hx     Amblyopia Neg Hx     Blindness Neg Hx     Cataracts Neg Hx     Glaucoma Neg Hx     Macular degeneration Neg Hx     Retinal detachment Neg Hx     Strabismus Neg Hx     Breast cancer Neg Hx     Ovarian cancer Neg Hx     Celiac disease Neg Hx     Esophageal cancer Neg Hx     Rectal cancer Neg Hx     Stomach cancer Neg Hx     Liver disease Neg Hx     Liver cancer Neg Hx          Review of Systems:  ROS:  Constitutional: no fever or chills  Eyes: no visual changes  ENT: no nasal congestion or sore throat  Respiratory: no cough or shortness of breath  Cardiovascular: no chest pain or palpitations  Gastrointestinal: no nausea or vomiting, tolerating diet  Genitourinary: no hematuria or dysuria  Integument/Breast: no rash or pruritis  Hematologic/Lymphatic: no easy bruising or lymphadenopathy  Musculoskeletal: right foot fracture  Neurological: no seizures or tremors  Behavioral/Psych: no auditory or visual hallucinations  Endocrine: no heat or cold intolerance      OBJECTIVE:     PHYSICAL EXAM:  Vital Signs (Most Recent)  Vitals:    10/05/20 1503   BP: 93/64   Pulse: 82   Resp: 18   Temp: 97.1 °F (36.2 °C)        ,   General Appearance: Well nourished, well developed, in no acute distress.  HENT: Normal cephalic, oropharynx pink and moist  Eyes: PERRLA bilaterally and EOM x 4  Respiratory: Even and unlabored  Skin: Warm and Dry.   Psychiatric: AAO x 4, Mood & affect are normal.    right  Foot/Ankle    General appearance: no acute distress,  alert/oriented x3, appropriate mood and affect, looks stated age and well nourished  The examination was performed out of splint/cast  Skin: normal  Swelling: minimal  Warmth: no warmth  Tenderness: diffuse  ROM: normal  Strength: normal  Gait: antalgic  Stability: stable to testing and Cotton test: negative  Crepitus: no  Neurological Exam: normal  Vascular Exam: normal and pulse present    mild soft tissue swelling to dorsum of foot.  mild tenderness to lateral aspect of foot.      RADIOGRAPHS:  X-ray of right foot dated 10/4/2020 was personally reviewed by me.  She has a mild displaced oblique fracture to 5th metatarsal shaft.  No other fractures or dislocations seen.    ASSESSMENT/PLAN:       ICD-10-CM ICD-9-CM   1. Closed displaced fracture of fifth metatarsal bone of right foot, initial encounter  S92.351A 825.25   2. Low vitamin D level  R79.89 790.6       Plan:  -Venkatesh Pritchett presents to clinic today with c/c right foot/ankle pain for the past 3 days, Date of injury 10/2/2020.  -X-ray as above.  -Recommend RICE therapy.  -Recommend to continue NWB status for 2 more weeks.  Ok to come out of CAM boot while sleeping.  -Recommend adding calcium supplement daily.  -May use Tylenol PRN for pain and alternate with NSAIDS PRN.  -I will see her back in 2 weeks, at time repeat x-ray of right foot.  If fracture is stable, consider changing to post op shoe.

## 2020-10-06 ENCOUNTER — PATIENT OUTREACH (OUTPATIENT)
Dept: ADMINISTRATIVE | Facility: OTHER | Age: 40
End: 2020-10-06

## 2020-10-06 NOTE — PROGRESS NOTES
Health Maintenance Due   Topic Date Due    Hepatitis C Screening  1980    Cervical Cancer Screening  05/11/2018    Mammogram  10/07/2018    Influenza Vaccine (1) 08/01/2020     Updates were requested from care everywhere.  Chart was reviewed for overdue Proactive [facility] Encounters ([facility]) topics (CRS, Breast Cancer Screening, Eye exam)  Health Maintenance has been updated.  LINKS immunization registry triggered.  Immunizations were reconciled.

## 2020-10-07 ENCOUNTER — PATIENT MESSAGE (OUTPATIENT)
Dept: ADMINISTRATIVE | Facility: HOSPITAL | Age: 40
End: 2020-10-07

## 2020-10-19 ENCOUNTER — OFFICE VISIT (OUTPATIENT)
Dept: ORTHOPEDICS | Facility: CLINIC | Age: 40
End: 2020-10-19
Payer: COMMERCIAL

## 2020-10-19 ENCOUNTER — HOSPITAL ENCOUNTER (OUTPATIENT)
Dept: RADIOLOGY | Facility: HOSPITAL | Age: 40
Discharge: HOME | End: 2020-10-19
Attending: PHYSICIAN ASSISTANT
Payer: COMMERCIAL

## 2020-10-19 VITALS
HEART RATE: 77 BPM | DIASTOLIC BLOOD PRESSURE: 74 MMHG | BODY MASS INDEX: 30.38 KG/M2 | HEIGHT: 64 IN | TEMPERATURE: 97 F | RESPIRATION RATE: 18 BRPM | SYSTOLIC BLOOD PRESSURE: 109 MMHG | WEIGHT: 177.94 LBS

## 2020-10-19 DIAGNOSIS — S92.351A CLOSED DISPLACED FRACTURE OF FIFTH METATARSAL BONE OF RIGHT FOOT, INITIAL ENCOUNTER: ICD-10-CM

## 2020-10-19 DIAGNOSIS — S92.351D CLOSED DISPLACED FRACTURE OF FIFTH METATARSAL BONE OF RIGHT FOOT WITH ROUTINE HEALING, SUBSEQUENT ENCOUNTER: Primary | ICD-10-CM

## 2020-10-19 PROCEDURE — 3008F PR BODY MASS INDEX (BMI) DOCUMENTED: ICD-10-PCS | Mod: CPTII,S$GLB,, | Performed by: PHYSICIAN ASSISTANT

## 2020-10-19 PROCEDURE — 99213 OFFICE O/P EST LOW 20 MIN: CPT | Mod: S$GLB,,, | Performed by: PHYSICIAN ASSISTANT

## 2020-10-19 PROCEDURE — 3008F BODY MASS INDEX DOCD: CPT | Mod: CPTII,S$GLB,, | Performed by: PHYSICIAN ASSISTANT

## 2020-10-19 PROCEDURE — 99999 PR PBB SHADOW E&M-EST. PATIENT-LVL III: CPT | Mod: PBBFAC,,, | Performed by: PHYSICIAN ASSISTANT

## 2020-10-19 PROCEDURE — 73630 X-RAY EXAM OF FOOT: CPT | Mod: TC,RT

## 2020-10-19 PROCEDURE — 73630 X-RAY EXAM OF FOOT: CPT | Mod: 26,RT,, | Performed by: RADIOLOGY

## 2020-10-19 PROCEDURE — 73630 XR FOOT COMPLETE 3 VIEW RIGHT: ICD-10-PCS | Mod: 26,RT,, | Performed by: RADIOLOGY

## 2020-10-19 PROCEDURE — 99999 PR PBB SHADOW E&M-EST. PATIENT-LVL III: ICD-10-PCS | Mod: PBBFAC,,, | Performed by: PHYSICIAN ASSISTANT

## 2020-10-19 PROCEDURE — 99213 PR OFFICE/OUTPT VISIT, EST, LEVL III, 20-29 MIN: ICD-10-PCS | Mod: S$GLB,,, | Performed by: PHYSICIAN ASSISTANT

## 2020-10-19 NOTE — PROGRESS NOTES
SUBJECTIVE:     Chief Complaint & History of Present Illness:  Venkatesh Pritchett is a New 40 y.o. year old female patient here for follow up of right midshaft fifth metatarsal fracture that occurred 10/02/2020 secondary to  jumping trying to kill a cockroach and when she came down she landed on her son's toy and fell down.  She was placed into a CAM boot, given crutches and told NWB .  She reports that over all she is doing better.  She reports she has been walking on her heel and has not always used her crutches.   She denies numbness or tingling sensation.  No reported fever or chills.    Review of patient's allergies indicates:  No Known Allergies      Current Outpatient Medications   Medication Sig Dispense Refill    amitriptyline (ELAVIL) 10 MG tablet Take 1 tablet (10 mg total) by mouth every evening. 30 tablet 11    esomeprazole (NEXIUM) 40 MG capsule Take 1 capsule (40 mg total) by mouth before breakfast. 30 capsule 11    FLUoxetine 10 MG capsule Take 1 capsule (10 mg total) by mouth once daily. 90 capsule 3    pantoprazole (PROTONIX) 40 MG tablet Take 1 tablet (40 mg total) by mouth once daily. 90 tablet 3    prenatal vit86-iron-folic acid 32-1,000 mg-mcg Tab One daily 90 tablet 3    tiZANidine (ZANAFLEX) 2 MG tablet Take 1-2 tabs around bedtime. No alcohol, no driving with medication. 60 tablet 11     No current facility-administered medications for this visit.        Past Medical History:   Diagnosis Date    IBS (irritable bowel syndrome)        Past Surgical History:   Procedure Laterality Date    DILATION AND CURETTAGE OF UTERUS  2000    MAB       Family History   Problem Relation Age of Onset    No Known Problems Father     No Known Problems Mother     Hypertension Neg Hx     Colon cancer Neg Hx     Amblyopia Neg Hx     Blindness Neg Hx     Cataracts Neg Hx     Glaucoma Neg Hx     Macular degeneration Neg Hx     Retinal detachment Neg Hx     Strabismus Neg Hx     Breast cancer Neg Hx      Ovarian cancer Neg Hx     Celiac disease Neg Hx     Esophageal cancer Neg Hx     Rectal cancer Neg Hx     Stomach cancer Neg Hx     Liver disease Neg Hx     Liver cancer Neg Hx          Review of Systems:  ROS:  Constitutional: no fever or chills  Eyes: no visual changes  ENT: no nasal congestion or sore throat  Respiratory: no cough or shortness of breath  Cardiovascular: no chest pain or palpitations  Gastrointestinal: no nausea or vomiting, tolerating diet  Genitourinary: no hematuria or dysuria  Integument/Breast: no rash or pruritis  Hematologic/Lymphatic: no easy bruising or lymphadenopathy  Musculoskeletal: right foot fracture  Neurological: no seizures or tremors  Behavioral/Psych: no auditory or visual hallucinations  Endocrine: no heat or cold intolerance      OBJECTIVE:     PHYSICAL EXAM:  Vital Signs (Most Recent)  There were no vitals filed for this visit.     ,   General Appearance: Well nourished, well developed, in no acute distress.  HENT: Normal cephalic, oropharynx pink and moist  Eyes: PERRLA bilaterally and EOM x 4  Respiratory: Even and unlabored  Skin: Warm and Dry.   Psychiatric: AAO x 4, Mood & affect are normal.    right  Foot/Ankle    General appearance: no acute distress, alert/oriented x3, appropriate mood and affect, looks stated age and well nourished  The examination was performed out of splint/cast  Skin: normal  Swelling: minimal  Warmth: no warmth  Tenderness: diffuse  ROM: normal  Strength: normal  Gait: antalgic  Stability: stable to testing and Cotton test: negative  Crepitus: no  Neurological Exam: normal  Vascular Exam: normal and pulse present    full range of motion of ankle. mild soft tissue swelling to dorsum of foot.  no tenderness to lateral aspect of foot.      RADIOGRAPHS:  X-ray of right foot Bones are satisfactorily mineralized.  Moderate hallux valgus and bunion formation.  Some flattening of the longitudinal arch of the foot as seen on the lateral view.   A recent oblique fracture is again identified involving the 5th metatarsal shaft.  Position and alignment appear similar to the previous study no significant callus formation identified.  No other fracture or dislocation is seen.  Small calcaneal spur at the insertion of the plantar fascia.    ASSESSMENT/PLAN:       ICD-10-CM ICD-9-CM   1. Closed displaced fracture of fifth metatarsal bone of right foot with routine healing, subsequent encounter  S92.351D V54.19       Plan:  -Discussed treatment plan.  -Recommend RICE therapy.  -placed into post op shoe, weight bearing as tolerated, range of motion as tolerated  -Recommend adding calcium supplement daily.  -May use Tylenol PRN for pain and alternate with NSAIDS PRN.  -I will see her back in 4 weeks, at time repeat x-ray of right foot.  If fracture is stable, consider changing regular shoe

## 2020-11-06 ENCOUNTER — TELEPHONE (OUTPATIENT)
Dept: INTERNAL MEDICINE | Facility: CLINIC | Age: 40
End: 2020-11-06

## 2020-11-06 ENCOUNTER — NURSE TRIAGE (OUTPATIENT)
Dept: ADMINISTRATIVE | Facility: CLINIC | Age: 40
End: 2020-11-06

## 2020-11-06 NOTE — TELEPHONE ENCOUNTER
----- Message from Heaven Smith sent at 11/6/2020  8:30 AM CST -----  Regarding: Pt self Mobile/Home [phone]  Patient would like a call back in regards to her saying that she's having chest pains, patient said that she spoke with an on call nurse and she was told that she can be scheduled to be seen. I did not talk to any nurse who told me that its okay to schedule this patient so I would like for you to give the patient a call please.

## 2020-11-06 NOTE — TELEPHONE ENCOUNTER
Spoke with pt, advised that she should seek care in UC, Pt advised that there are no appointments with Dr. Carrion today. Offered to schedule pt to be seen in UC, pt declined, states that she will go to UC near her home.

## 2020-11-06 NOTE — TELEPHONE ENCOUNTER
Pt reporting chest pain on the left side. Per protocol, advised to go to be seen today by MD. No appts in Epic. Pt wishes to see Dr Dupree. Message sent to Dr Carrion to contact pt ASAP. Advised pt to call back with questions or concerns or worsening symptoms.    Reason for Disposition   Intermittent chest pains persist > 3 days    Additional Information   Negative: SEVERE difficulty breathing (e.g., struggling for each breath, speaks in single words)   Negative: Passed out (i.e., fainted, collapsed and was not responding)   Negative: Chest pain lasting longer than 5 minutes and ANY of the following:* Over 50 years old* Over 30 years old and at least one cardiac risk factor (i.e., high blood pressure, diabetes, high cholesterol, obesity, smoker or strong family history of heart disease)* Pain is crushing, pressure-like, or heavy * Took nitroglycerin and chest pain was not relieved* History of heart disease (i.e., angina, heart attack, bypass surgery, angioplasty, CHF)   Negative: Visible sweat on face or sweat dripping down face   Negative: Sounds like a life-threatening emergency to the triager   Negative: SEVERE chest pain   Negative: Pain also present in shoulder(s) or arm(s) or jaw   Negative: Difficulty breathing   Negative: Cocaine use within last 3 days   Negative: History of prior 'blood clot' in leg or lungs (i.e., deep vein thrombosis, pulmonary embolism)   Negative: Recent illness requiring prolonged bed rest (i.e., immobilization)   Negative: Hip or leg fracture in past 2 months (e.g, or had cast on leg or ankle)   Negative: Major surgery in the past month   Negative: Recent long-distance travel with prolonged time in car, bus, plane, or train (i.e., within past 2 weeks; 6 or more hours duration)   Negative: Heart beating irregularly or very rapidly   Negative: Chest pain lasting longer than 5 minutes   Negative: Intermittent chest pain and pain has been increasing in severity or  frequency   Negative: Dizziness or lightheadedness   Negative: Coughing up blood   Negative: Patient sounds very sick or weak to the triager   Negative: Fever > 100.5 F (38.1 C)    Protocols used: ST CHEST PAIN-A-[place]

## 2020-11-09 ENCOUNTER — OFFICE VISIT (OUTPATIENT)
Dept: INTERNAL MEDICINE | Facility: CLINIC | Age: 40
End: 2020-11-09
Payer: COMMERCIAL

## 2020-11-09 VITALS
HEIGHT: 64 IN | HEART RATE: 94 BPM | TEMPERATURE: 97 F | BODY MASS INDEX: 30.26 KG/M2 | DIASTOLIC BLOOD PRESSURE: 70 MMHG | OXYGEN SATURATION: 99 % | WEIGHT: 177.25 LBS | SYSTOLIC BLOOD PRESSURE: 110 MMHG

## 2020-11-09 DIAGNOSIS — G89.29 CHRONIC NONINTRACTABLE HEADACHE, UNSPECIFIED HEADACHE TYPE: Primary | ICD-10-CM

## 2020-11-09 DIAGNOSIS — R51.9 CHRONIC NONINTRACTABLE HEADACHE, UNSPECIFIED HEADACHE TYPE: Primary | ICD-10-CM

## 2020-11-09 DIAGNOSIS — F41.9 ANXIETY: ICD-10-CM

## 2020-11-09 PROCEDURE — 99999 PR PBB SHADOW E&M-EST. PATIENT-LVL IV: CPT | Mod: PBBFAC,,, | Performed by: NURSE PRACTITIONER

## 2020-11-09 PROCEDURE — 99213 OFFICE O/P EST LOW 20 MIN: CPT | Mod: S$GLB,,, | Performed by: NURSE PRACTITIONER

## 2020-11-09 PROCEDURE — 99213 PR OFFICE/OUTPT VISIT, EST, LEVL III, 20-29 MIN: ICD-10-PCS | Mod: S$GLB,,, | Performed by: NURSE PRACTITIONER

## 2020-11-09 PROCEDURE — 3008F PR BODY MASS INDEX (BMI) DOCUMENTED: ICD-10-PCS | Mod: CPTII,S$GLB,, | Performed by: NURSE PRACTITIONER

## 2020-11-09 PROCEDURE — 99999 PR PBB SHADOW E&M-EST. PATIENT-LVL IV: ICD-10-PCS | Mod: PBBFAC,,, | Performed by: NURSE PRACTITIONER

## 2020-11-09 PROCEDURE — 3008F BODY MASS INDEX DOCD: CPT | Mod: CPTII,S$GLB,, | Performed by: NURSE PRACTITIONER

## 2020-11-09 NOTE — Clinical Note
Ousmane SANTIZO  Ms Venkatesh came in with her . She wasn't clear on what she needed to be seen for. Her most concerning issue was that she felt discomfort with deep palpation of an area on her left mid chest. I did not note any abnormality on exam. Chest X ray offered, patient declined today.  She stopped taking the Amitriptyline for a few months, now headaches, anxiety, and her focus that something is wrong has returned. She states that she doesn't experience this when taking medications regularly. I offered a psych referral. She will think about it, her  seemed supportive of this idea but she declined today.  She never started the Fluoxetine but has picked up the prescription. I have encouraged her to start this. She has a follow up visit with you on 12/3.   Mammogram due, patient deferred scheduling.

## 2020-11-09 NOTE — PROGRESS NOTES
Subjective:      Patient ID: Venkatesh Pritchett is a 40 y.o. female.    Chief Complaint: No chief complaint on file.    Ms Riddle is an established patient of Dr Carrion. I have seen her in the past for generalized fatigue.  Mrs Pritchett is here today with multiple issues.  She states that back in September she saw Dr SANTIZO and discussed that the right chest showed some discomfort she does report or applying ice to the area on a few occasions and notes symptoms have resolved.  She does state that she is experiencing the same thing on the mid chest left side and I have advised her do the same today.  Deep palpation recreates this discomfort. It does not radiate. She was very concerned about this. She states that she has seen her friend who is a physician several times regarding this. Reassurance was given.   She and her  both state that at times, she has multiple ailments and concerns because of patient's anxiety. She has restarted taking Amitriptyline @ 20 mg nightly. She has been taking this the last 5 days.  This was initially prescribed at 10 mg daily. She never started the Fluoxetine.   She reports that she doesn't experience many of these symptoms when taking Amitriptyline regularly, however when she is feeling well, she discontinues the medication.    She is in fact due for mammogram, it has been ordered by her PCP.      She states that she continues to have periodic headaches which are accompanied by light dizziness which are relieved with Tylenol.  Provoking factors include stress and anxiety.  They have not changed in duration and intensity.  It is not accompanied by nausea, vomiting, visual changes, weakness, numbness. She does not experience headaches when taking Amitriptyline regularly. It was initially prescribed as a preventative measure for migraines. She was evaluated by Neurology in 2018.       Review of Systems   Constitutional: Negative for activity change, appetite change, chills, diaphoresis,  fatigue, fever and unexpected weight change.   Respiratory: Negative for cough, shortness of breath and wheezing.    Cardiovascular: Negative for chest pain, palpitations and leg swelling.   Gastrointestinal: Negative for diarrhea, nausea and vomiting.   Musculoskeletal: Negative for arthralgias, gait problem, joint swelling and neck stiffness.   Allergic/Immunologic: Negative for immunocompromised state.   Neurological: Positive for dizziness and headaches. Negative for light-headedness and numbness.        History of migraine with associated dizziness (evaluated by Neurology) same presentation as 2018  Not currently symptomatic   Psychiatric/Behavioral: The patient is nervous/anxious.        Review of patient's allergies indicates:  No Known Allergies    Current Outpatient Medications   Medication Sig Dispense Refill    amitriptyline (ELAVIL) 10 MG tablet Take 1 tablet (10 mg total) by mouth every evening. 30 tablet 11    FLUoxetine 10 MG capsule Take 1 capsule (10 mg total) by mouth once daily. (Patient not taking: Reported on 11/9/2020) 90 capsule 3    prenatal vit86-iron-folic acid 32-1,000 mg-mcg Tab One daily (Patient not taking: Reported on 11/9/2020) 90 tablet 3     No current facility-administered medications for this visit.        Patient Active Problem List    Diagnosis Date Noted    Anxiety 11/09/2020    Insufficiency of tear film of both eyes 12/31/2018    Strain of left pectoralis muscle 07/28/2017    Conjunctivitis 02/06/2015    IBS (irritable bowel syndrome) 09/05/2014       Past Medical History:   Diagnosis Date    IBS (irritable bowel syndrome)        Past Surgical History:   Procedure Laterality Date    DILATION AND CURETTAGE OF UTERUS  2000    MAB       Family History   Problem Relation Age of Onset    No Known Problems Father     No Known Problems Mother     Hypertension Neg Hx     Colon cancer Neg Hx     Amblyopia Neg Hx     Blindness Neg Hx     Cataracts Neg Hx     Glaucoma  "Neg Hx     Macular degeneration Neg Hx     Retinal detachment Neg Hx     Strabismus Neg Hx     Breast cancer Neg Hx     Ovarian cancer Neg Hx     Celiac disease Neg Hx     Esophageal cancer Neg Hx     Rectal cancer Neg Hx     Stomach cancer Neg Hx     Liver disease Neg Hx     Liver cancer Neg Hx          Objective:     Lab Results   Component Value Date    WBC 9.65 02/18/2020    HGB 12.4 02/18/2020    HCT 40.8 02/18/2020     02/18/2020    CHOL 172 01/04/2019    TRIG 86 01/04/2019    HDL 69 01/04/2019    ALT 12 03/04/2020    AST 14 03/04/2020     03/04/2020    K 4.1 03/04/2020     03/04/2020    CREATININE 0.8 03/04/2020    BUN 12 03/04/2020    CO2 27 03/04/2020    TSH 1.036 03/04/2020    HGBA1C 5.0 01/04/2019       Vitals:    11/09/20 0953   BP: 110/70   Pulse: 94   Temp: 97 °F (36.1 °C)   TempSrc: Oral   SpO2: 99%   Weight: 80.4 kg (177 lb 4 oz)   Height: 5' 4" (1.626 m)   PainSc: 0-No pain       Body mass index is 30.42 kg/m².    Physical Exam  Vitals signs and nursing note reviewed.   Constitutional:       Appearance: She is well-developed.   HENT:      Head: Normocephalic and atraumatic.   Cardiovascular:      Rate and Rhythm: Normal rate and regular rhythm.      Pulses: Normal pulses.      Heart sounds: Normal heart sounds. No murmur.   Pulmonary:      Effort: Pulmonary effort is normal. No respiratory distress.      Breath sounds: Normal breath sounds. No wheezing.   Musculoskeletal: Normal range of motion.      Right lower leg: No edema.      Left lower leg: No edema.   Skin:     General: Skin is warm and dry.   Neurological:      Mental Status: She is alert and oriented to person, place, and time.      Cranial Nerves: No cranial nerve deficit.      Motor: Motor function is intact. No weakness, tremor, abnormal muscle tone or pronator drift.      Coordination: Romberg sign negative. Coordination normal. Heel to Shin Test normal.      Gait: Gait and tandem walk normal. "   Psychiatric:         Mood and Affect: Mood normal.         Behavior: Behavior normal.         Thought Content: Thought content normal.         Judgment: Judgment normal.       Assessment:     1. Chronic nonintractable headache, unspecified headache type    2. Anxiety      Plan:     Diagnoses and all orders for this visit:    Chronic nonintractable headache, unspecified headache type  Chronic.Continue Amitriptyline as ordered.   Not currently symptomatic, controlled when taking medications as prescribed.    Anxiety  Start Fluoxetine as prescribed by PCP.  Continue cool compresses as advised mid chest and L lateral chest.  Mammogram is due. Encouraged to schedule.  No abnormality on palpation, discussed additional imaging such as X ray chest; patient declined today.     Health Maintenance   Topic Date Due    Hepatitis C Screening  1980    Mammogram  10/07/2018    TETANUS VACCINE  03/23/2025    Lipid Panel  Completed       Patient Instructions   Call or return to clinic if symptoms worsen or do not improve with discussed treatment plan.     Keep appointment with Dr Carrion on 12/3. Follow up sooner with me if problems.    Take medications as prescribed.

## 2020-11-09 NOTE — PATIENT INSTRUCTIONS
Call or return to clinic if symptoms worsen or do not improve with discussed treatment plan.     Keep appointment with Dr Carrion on 12/3. Follow up sooner with me if problems.    Take medications as prescribed.

## 2020-11-15 ENCOUNTER — PATIENT OUTREACH (OUTPATIENT)
Dept: ADMINISTRATIVE | Facility: OTHER | Age: 40
End: 2020-11-15

## 2020-11-16 NOTE — PROGRESS NOTES
Requested updates within Care Everywhere.  Patient's chart was reviewed for overdue BOS[facility] topics.  Immunizations reconciled.    Tasked appt: Mammogram 10/07 PCP/[facility]

## 2020-11-24 DIAGNOSIS — G43.009 MIGRAINE WITHOUT AURA AND WITHOUT STATUS MIGRAINOSUS, NOT INTRACTABLE: ICD-10-CM

## 2020-11-24 RX ORDER — AMITRIPTYLINE HYDROCHLORIDE 10 MG/1
10 TABLET, FILM COATED ORAL NIGHTLY
Qty: 30 TABLET | Refills: 11 | Status: CANCELLED | OUTPATIENT
Start: 2020-11-24 | End: 2021-11-24

## 2020-11-25 ENCOUNTER — TELEPHONE (OUTPATIENT)
Dept: INTERNAL MEDICINE | Facility: CLINIC | Age: 40
End: 2020-11-25

## 2020-11-25 RX ORDER — AMITRIPTYLINE HYDROCHLORIDE 10 MG/1
20 TABLET, FILM COATED ORAL NIGHTLY
Qty: 60 TABLET | Refills: 0 | Status: SHIPPED | OUTPATIENT
Start: 2020-11-25 | End: 2020-12-14 | Stop reason: SDUPTHER

## 2020-11-25 NOTE — TELEPHONE ENCOUNTER
----- Message from Tessa Singh sent at 11/25/2020  1:34 PM CST -----  Contact: Milton (spouse)  [phone]  Patient  stated  she wanted  her Medication for  amitriptyline (ELAVIL) 10 MG tablet mg changed to 40 mg. Please call this to  [facility]   [phone]. This was requested earlier.

## 2020-11-25 NOTE — TELEPHONE ENCOUNTER
----- Message from Georgia Dee sent at 11/25/2020  9:52 AM CST -----  Contact: Self   Patient is returning a phone call.  Who left a message for the patient: Mark Joy MA  Does patient know what this is regarding:    Comments:

## 2020-11-27 ENCOUNTER — PATIENT OUTREACH (OUTPATIENT)
Dept: ADMINISTRATIVE | Facility: HOSPITAL | Age: 40
End: 2020-11-27

## 2020-12-03 ENCOUNTER — HOSPITAL ENCOUNTER (OUTPATIENT)
Dept: RADIOLOGY | Facility: HOSPITAL | Age: 40
Discharge: HOME | End: 2020-12-03
Attending: PHYSICIAN ASSISTANT
Payer: COMMERCIAL

## 2020-12-03 ENCOUNTER — OFFICE VISIT (OUTPATIENT)
Dept: ORTHOPEDICS | Facility: CLINIC | Age: 40
End: 2020-12-03
Payer: COMMERCIAL

## 2020-12-03 VITALS — HEIGHT: 64 IN | BODY MASS INDEX: 30.26 KG/M2 | TEMPERATURE: 97 F | WEIGHT: 177.25 LBS

## 2020-12-03 DIAGNOSIS — S92.351D CLOSED DISPLACED FRACTURE OF FIFTH METATARSAL BONE OF RIGHT FOOT WITH ROUTINE HEALING, SUBSEQUENT ENCOUNTER: ICD-10-CM

## 2020-12-03 DIAGNOSIS — S92.351D CLOSED DISPLACED FRACTURE OF FIFTH METATARSAL BONE OF RIGHT FOOT WITH ROUTINE HEALING, SUBSEQUENT ENCOUNTER: Primary | ICD-10-CM

## 2020-12-03 PROCEDURE — 3008F PR BODY MASS INDEX (BMI) DOCUMENTED: ICD-10-PCS | Mod: CPTII,S$GLB,, | Performed by: PHYSICIAN ASSISTANT

## 2020-12-03 PROCEDURE — 73630 X-RAY EXAM OF FOOT: CPT | Mod: 26,RT,, | Performed by: RADIOLOGY

## 2020-12-03 PROCEDURE — 3008F BODY MASS INDEX DOCD: CPT | Mod: CPTII,S$GLB,, | Performed by: PHYSICIAN ASSISTANT

## 2020-12-03 PROCEDURE — 1125F PR PAIN SEVERITY QUANTIFIED, PAIN PRESENT: ICD-10-PCS | Mod: S$GLB,,, | Performed by: PHYSICIAN ASSISTANT

## 2020-12-03 PROCEDURE — 99999 PR PBB SHADOW E&M-EST. PATIENT-LVL III: CPT | Mod: PBBFAC,,, | Performed by: PHYSICIAN ASSISTANT

## 2020-12-03 PROCEDURE — 73630 X-RAY EXAM OF FOOT: CPT | Mod: TC,RT

## 2020-12-03 PROCEDURE — 99213 PR OFFICE/OUTPT VISIT, EST, LEVL III, 20-29 MIN: ICD-10-PCS | Mod: S$GLB,,, | Performed by: PHYSICIAN ASSISTANT

## 2020-12-03 PROCEDURE — 73630 XR FOOT COMPLETE 3 VIEW RIGHT: ICD-10-PCS | Mod: 26,RT,, | Performed by: RADIOLOGY

## 2020-12-03 PROCEDURE — 99213 OFFICE O/P EST LOW 20 MIN: CPT | Mod: S$GLB,,, | Performed by: PHYSICIAN ASSISTANT

## 2020-12-03 PROCEDURE — 99999 PR PBB SHADOW E&M-EST. PATIENT-LVL III: ICD-10-PCS | Mod: PBBFAC,,, | Performed by: PHYSICIAN ASSISTANT

## 2020-12-03 PROCEDURE — 1125F AMNT PAIN NOTED PAIN PRSNT: CPT | Mod: S$GLB,,, | Performed by: PHYSICIAN ASSISTANT

## 2020-12-03 NOTE — PROGRESS NOTES
SUBJECTIVE:     Chief Complaint & History of Present Illness:  Venkatesh Pritchett is a New 40 y.o. year old female patient here for follow up of right midshaft fifth metatarsal fracture that occurred 10/02/2020 secondary to  jumping trying to kill a cockroach and when she came down she landed on her son's toy and fell down.  Has been in a post op show weight bearing as tolerated .  She reports that over all she is doing better.   She denies numbness or tingling sensation.  No reported fever or chills.    Review of patient's allergies indicates:  No Known Allergies      Current Outpatient Medications   Medication Sig Dispense Refill    amitriptyline (ELAVIL) 10 MG tablet Take 2 tablets (20 mg total) by mouth every evening. 60 tablet 0    FLUoxetine 10 MG capsule Take 1 capsule (10 mg total) by mouth once daily. (Patient not taking: Reported on 11/9/2020) 90 capsule 3    prenatal vit86-iron-folic acid 32-1,000 mg-mcg Tab One daily (Patient not taking: Reported on 11/9/2020) 90 tablet 3     No current facility-administered medications for this visit.        Past Medical History:   Diagnosis Date    IBS (irritable bowel syndrome)        Past Surgical History:   Procedure Laterality Date    DILATION AND CURETTAGE OF UTERUS  2000    MAB       Family History   Problem Relation Age of Onset    No Known Problems Father     No Known Problems Mother     Hypertension Neg Hx     Colon cancer Neg Hx     Amblyopia Neg Hx     Blindness Neg Hx     Cataracts Neg Hx     Glaucoma Neg Hx     Macular degeneration Neg Hx     Retinal detachment Neg Hx     Strabismus Neg Hx     Breast cancer Neg Hx     Ovarian cancer Neg Hx     Celiac disease Neg Hx     Esophageal cancer Neg Hx     Rectal cancer Neg Hx     Stomach cancer Neg Hx     Liver disease Neg Hx     Liver cancer Neg Hx          Review of Systems:  ROS:  Constitutional: no fever or chills  Eyes: no visual changes  ENT: no nasal congestion or sore  throat  Respiratory: no cough or shortness of breath  Cardiovascular: no chest pain or palpitations  Gastrointestinal: no nausea or vomiting, tolerating diet  Genitourinary: no hematuria or dysuria  Integument/Breast: no rash or pruritis  Hematologic/Lymphatic: no easy bruising or lymphadenopathy  Musculoskeletal: right foot fracture  Neurological: no seizures or tremors  Behavioral/Psych: no auditory or visual hallucinations  Endocrine: no heat or cold intolerance      OBJECTIVE:     PHYSICAL EXAM:  Vital Signs (Most Recent)  There were no vitals filed for this visit.     ,   General Appearance: Well nourished, well developed, in no acute distress.  HENT: Normal cephalic, oropharynx pink and moist  Eyes: PERRLA bilaterally and EOM x 4  Respiratory: Even and unlabored  Skin: Warm and Dry.   Psychiatric: AAO x 4, Mood & affect are normal.    right  Foot/Ankle    General appearance: no acute distress, alert/oriented x3, appropriate mood and affect, looks stated age and well nourished  The examination was performed out of splint/cast  Skin: normal  Swelling: minimal  Warmth: no warmth  Tenderness: diffuse  ROM: normal  Strength: normal  Gait: antalgic  Stability: stable to testing and Cotton test: negative  Crepitus: no  Neurological Exam: normal  Vascular Exam: normal and pulse present    full range of motion of ankle. mild soft tissue   No TTP at fracture site    RADIOGRAPHS:  X-ray of right foot : reviewed by myself  Healing fracture 5th metatarsal shaft.  Position alignment stable satisfactory.  Remote changes of foot ankle stable    ASSESSMENT/PLAN:       ICD-10-CM ICD-9-CM   1. Closed displaced fracture of fifth metatarsal bone of right foot with routine healing, subsequent encounter  S92.351D V54.19       Plan:  -Discussed treatment plan.  -Recommend RICE therapy.  -wean into a regular hard sole shoe, weight bearing as tolerated, range of motion as tolerated  -Recommend adding calcium supplement daily.  -May use  Tylenol PRN for pain and alternate with NSAIDS PRN.  -I will see her back in 6weeks, if any increase in pain at time repeat x-ray of right foot.

## 2020-12-14 ENCOUNTER — OFFICE VISIT (OUTPATIENT)
Dept: INTERNAL MEDICINE | Facility: CLINIC | Age: 40
End: 2020-12-14
Payer: COMMERCIAL

## 2020-12-14 ENCOUNTER — PATIENT OUTREACH (OUTPATIENT)
Dept: ADMINISTRATIVE | Facility: HOSPITAL | Age: 40
End: 2020-12-14

## 2020-12-14 VITALS
WEIGHT: 179 LBS | BODY MASS INDEX: 30.56 KG/M2 | DIASTOLIC BLOOD PRESSURE: 74 MMHG | HEIGHT: 64 IN | OXYGEN SATURATION: 99 % | SYSTOLIC BLOOD PRESSURE: 122 MMHG | HEART RATE: 99 BPM

## 2020-12-14 DIAGNOSIS — N60.02 BREAST CYST, LEFT: ICD-10-CM

## 2020-12-14 DIAGNOSIS — Z00.00 WELLNESS EXAMINATION: ICD-10-CM

## 2020-12-14 DIAGNOSIS — R07.89 CHEST WALL PAIN: Primary | ICD-10-CM

## 2020-12-14 DIAGNOSIS — G43.009 MIGRAINE WITHOUT AURA AND WITHOUT STATUS MIGRAINOSUS, NOT INTRACTABLE: ICD-10-CM

## 2020-12-14 PROCEDURE — 99213 OFFICE O/P EST LOW 20 MIN: CPT | Mod: S$GLB,,, | Performed by: INTERNAL MEDICINE

## 2020-12-14 PROCEDURE — 3008F PR BODY MASS INDEX (BMI) DOCUMENTED: ICD-10-PCS | Mod: CPTII,S$GLB,, | Performed by: INTERNAL MEDICINE

## 2020-12-14 PROCEDURE — 1125F PR PAIN SEVERITY QUANTIFIED, PAIN PRESENT: ICD-10-PCS | Mod: S$GLB,,, | Performed by: INTERNAL MEDICINE

## 2020-12-14 PROCEDURE — 99213 PR OFFICE/OUTPT VISIT, EST, LEVL III, 20-29 MIN: ICD-10-PCS | Mod: S$GLB,,, | Performed by: INTERNAL MEDICINE

## 2020-12-14 PROCEDURE — 3008F BODY MASS INDEX DOCD: CPT | Mod: CPTII,S$GLB,, | Performed by: INTERNAL MEDICINE

## 2020-12-14 PROCEDURE — 1125F AMNT PAIN NOTED PAIN PRSNT: CPT | Mod: S$GLB,,, | Performed by: INTERNAL MEDICINE

## 2020-12-14 PROCEDURE — 99999 PR PBB SHADOW E&M-EST. PATIENT-LVL III: ICD-10-PCS | Mod: PBBFAC,,, | Performed by: INTERNAL MEDICINE

## 2020-12-14 PROCEDURE — 99999 PR PBB SHADOW E&M-EST. PATIENT-LVL III: CPT | Mod: PBBFAC,,, | Performed by: INTERNAL MEDICINE

## 2020-12-14 RX ORDER — AMITRIPTYLINE HYDROCHLORIDE 10 MG/1
20 TABLET, FILM COATED ORAL NIGHTLY
Qty: 60 TABLET | Refills: 6 | Status: SHIPPED | OUTPATIENT
Start: 2020-12-14 | End: 2022-01-26

## 2020-12-14 RX ORDER — MELOXICAM 15 MG/1
15 TABLET ORAL DAILY
Qty: 15 TABLET | Refills: 0 | Status: SHIPPED | OUTPATIENT
Start: 2020-12-14 | End: 2022-01-26

## 2020-12-14 NOTE — PROGRESS NOTES
Subjective:       Patient ID: Venkatesh Pritchett is a 40 y.o. female.    Chief Complaint: Follow-up and Back Pain    HPIPt had R shoulder and chest wall pain now left shoulder and chest wall pain.  No SOB - worse with movement and palpation.  Likes amitriptyline did not try SSRI.   Review of Systems   Respiratory: Negative for shortness of breath (PND or orthopnea).    Cardiovascular: Negative for chest pain (arm pain or jaw pain).   Gastrointestinal: Negative for abdominal pain, diarrhea, nausea and vomiting.   Genitourinary: Negative for dysuria.   Neurological: Negative for seizures, syncope and headaches.       Objective:      Physical Exam  Constitutional:       General: She is not in acute distress.     Appearance: She is well-developed.   HENT:      Head: Normocephalic.   Eyes:      Pupils: Pupils are equal, round, and reactive to light.   Neck:      Musculoskeletal: Neck supple.      Thyroid: No thyromegaly.      Vascular: No JVD.   Cardiovascular:      Rate and Rhythm: Normal rate and regular rhythm.      Heart sounds: Normal heart sounds. No murmur. No friction rub. No gallop.       Comments: Tender over L chest wall.    Some pain on ROM of L shoulder - soul be from picking up her 5 year old now with R side instead of her left side  Pulmonary:      Effort: Pulmonary effort is normal.      Breath sounds: Normal breath sounds. No wheezing or rales.      Comments: Small pea sized cyst L medial breast - very rubbery and movable  Abdominal:      General: Bowel sounds are normal. There is no distension.      Palpations: Abdomen is soft. There is no mass.      Tenderness: There is no abdominal tenderness. There is no guarding or rebound.   Lymphadenopathy:      Cervical: No cervical adenopathy.   Skin:     General: Skin is warm and dry.   Neurological:      Mental Status: She is alert and oriented to person, place, and time.      Deep Tendon Reflexes: Reflexes are normal and symmetric.   Psychiatric:         Behavior:  Behavior normal.         Thought Content: Thought content normal.         Judgment: Judgment normal.         Assessment:       1. Chest wall pain    2. Migraine without aura and without status migrainosus, not intractable    3. Breast cyst, left    4. Wellness examination        Plan:   Chest wall pain  -     X-Ray Chest PA And Lateral; Future; Expected date: 12/14/2020  EKG - NSR, nl EKG  Migraine without aura and without status migrainosus, not intractable  -     amitriptyline (ELAVIL) 10 MG tablet; Take 2 tablets (20 mg total) by mouth every evening.  Dispense: 60 tablet; Refill: 6    Breast cyst, left  -     Mammo Digital Screening Bilat w/ Stephen; Future; Expected date: 06/14/2021  -      Breast Left Limited; Future; Expected date: 12/14/2020    Wellness examination  -     CBC Auto Differential; Future; Expected date: 12/14/2020  -     Comprehensive Metabolic Panel; Future; Expected date: 12/14/2020  -     Lipid Panel; Future; Expected date: 12/14/2020  -     TSH; Future; Expected date: 12/14/2020  -     Hemoglobin A1C; Future; Expected date: 12/14/2020  -     Vitamin D; Future; Expected date: 12/14/2020  -     Sedimentation rate; Future; Expected date: 12/14/2020  -     C-Reactive Protein; Future; Expected date: 12/14/2020    Costochondritis  -     meloxicam (MOBIC) 15 MG tablet; Take 1 tablet (15 mg total) by mouth once daily.  Dispense: 15 tablet; Refill: 0  Pt reassured

## 2020-12-15 ENCOUNTER — TELEPHONE (OUTPATIENT)
Dept: INTERNAL MEDICINE | Facility: CLINIC | Age: 40
End: 2020-12-15

## 2020-12-15 NOTE — TELEPHONE ENCOUNTER
----- Message from Arianne Oswald sent at 12/15/2020  8:17 AM CST -----  Regarding: EKG ORDER  Please place and link EKG order to the EKG or Doctor appointment scheduled on 12/14/20 thanks. Arianne [id number]

## 2021-01-04 ENCOUNTER — PATIENT MESSAGE (OUTPATIENT)
Dept: ADMINISTRATIVE | Facility: HOSPITAL | Age: 41
End: 2021-01-04

## 2021-02-10 NOTE — PROGRESS NOTES
HPI     Triage pt  Patient states OU pain and dryness x month. Notice more pain when she has   watched television for a long period or computer.  5 on pain scale.  Eye drops:OTC visine prn OU(only used past 2 days)    I have personally interviewed the patient, reviewed the history and   examined the patient and agree with the technician's exam.    Last edited by Jack Love MD on 12/31/2018  1:41 PM. (History)            Assessment /Plan     For exam results, see Encounter Report.    Insufficiency of tear film of both eyes      Ms. Pritchett has a tear insufficiency without significant epithelial damage. I recommended artificial tears four times daily and a lubricating ointment or gel at bedtime. Return if not working in a couple of weeks.                  Additional Safety/Bands:

## 2021-03-10 ENCOUNTER — TELEPHONE (OUTPATIENT)
Dept: INTERNAL MEDICINE | Facility: CLINIC | Age: 41
End: 2021-03-10

## 2021-03-18 ENCOUNTER — IMMUNIZATION (OUTPATIENT)
Dept: FAMILY MEDICINE | Facility: CLINIC | Age: 41
End: 2021-03-18
Payer: COMMERCIAL

## 2021-03-18 DIAGNOSIS — Z23 NEED FOR VACCINATION: Primary | ICD-10-CM

## 2021-03-18 PROCEDURE — 91300 COVID-19, MRNA, LNP-S, PF, 30 MCG/0.3 ML DOSE VACCINE: CPT | Mod: PBBFAC | Performed by: FAMILY MEDICINE

## 2021-03-23 ENCOUNTER — PATIENT OUTREACH (OUTPATIENT)
Dept: ADMINISTRATIVE | Facility: HOSPITAL | Age: 41
End: 2021-03-23

## 2021-03-23 ENCOUNTER — OFFICE VISIT (OUTPATIENT)
Dept: INTERNAL MEDICINE | Facility: CLINIC | Age: 41
End: 2021-03-23
Payer: COMMERCIAL

## 2021-03-23 VITALS
HEART RATE: 102 BPM | SYSTOLIC BLOOD PRESSURE: 122 MMHG | OXYGEN SATURATION: 99 % | HEIGHT: 64 IN | WEIGHT: 184.31 LBS | BODY MASS INDEX: 31.47 KG/M2 | DIASTOLIC BLOOD PRESSURE: 68 MMHG

## 2021-03-23 DIAGNOSIS — F41.9 ANXIETY: Primary | ICD-10-CM

## 2021-03-23 DIAGNOSIS — N64.4 MASTALGIA: ICD-10-CM

## 2021-03-23 PROCEDURE — 99214 PR OFFICE/OUTPT VISIT, EST, LEVL IV, 30-39 MIN: ICD-10-PCS | Mod: S$GLB,,, | Performed by: INTERNAL MEDICINE

## 2021-03-23 PROCEDURE — 3008F PR BODY MASS INDEX (BMI) DOCUMENTED: ICD-10-PCS | Mod: CPTII,S$GLB,, | Performed by: INTERNAL MEDICINE

## 2021-03-23 PROCEDURE — 1125F AMNT PAIN NOTED PAIN PRSNT: CPT | Mod: S$GLB,,, | Performed by: INTERNAL MEDICINE

## 2021-03-23 PROCEDURE — 99214 OFFICE O/P EST MOD 30 MIN: CPT | Mod: S$GLB,,, | Performed by: INTERNAL MEDICINE

## 2021-03-23 PROCEDURE — 1125F PR PAIN SEVERITY QUANTIFIED, PAIN PRESENT: ICD-10-PCS | Mod: S$GLB,,, | Performed by: INTERNAL MEDICINE

## 2021-03-23 PROCEDURE — 3008F BODY MASS INDEX DOCD: CPT | Mod: CPTII,S$GLB,, | Performed by: INTERNAL MEDICINE

## 2021-03-23 PROCEDURE — 99999 PR PBB SHADOW E&M-EST. PATIENT-LVL III: ICD-10-PCS | Mod: PBBFAC,,, | Performed by: INTERNAL MEDICINE

## 2021-03-23 PROCEDURE — 99999 PR PBB SHADOW E&M-EST. PATIENT-LVL III: CPT | Mod: PBBFAC,,, | Performed by: INTERNAL MEDICINE

## 2021-03-23 RX ORDER — PANTOPRAZOLE SODIUM 40 MG/1
40 TABLET, DELAYED RELEASE ORAL DAILY
Qty: 30 TABLET | Refills: 11 | Status: SHIPPED | OUTPATIENT
Start: 2021-03-23 | End: 2022-01-26

## 2021-03-23 RX ORDER — SERTRALINE HYDROCHLORIDE 25 MG/1
25 TABLET, FILM COATED ORAL DAILY
Qty: 30 TABLET | Refills: 3 | Status: SHIPPED | OUTPATIENT
Start: 2021-03-23 | End: 2022-01-26

## 2021-04-05 ENCOUNTER — PATIENT MESSAGE (OUTPATIENT)
Dept: ADMINISTRATIVE | Facility: HOSPITAL | Age: 41
End: 2021-04-05

## 2021-04-08 ENCOUNTER — IMMUNIZATION (OUTPATIENT)
Dept: FAMILY MEDICINE | Facility: CLINIC | Age: 41
End: 2021-04-08
Payer: COMMERCIAL

## 2021-04-08 DIAGNOSIS — Z23 NEED FOR VACCINATION: Primary | ICD-10-CM

## 2021-04-08 PROCEDURE — 91300 COVID-19, MRNA, LNP-S, PF, 30 MCG/0.3 ML DOSE VACCINE: ICD-10-PCS | Mod: S$GLB,,, | Performed by: FAMILY MEDICINE

## 2021-04-08 PROCEDURE — 0002A COVID-19, MRNA, LNP-S, PF, 30 MCG/0.3 ML DOSE VACCINE: CPT | Mod: CV19,S$GLB,, | Performed by: FAMILY MEDICINE

## 2021-04-08 PROCEDURE — 91300 COVID-19, MRNA, LNP-S, PF, 30 MCG/0.3 ML DOSE VACCINE: CPT | Mod: S$GLB,,, | Performed by: FAMILY MEDICINE

## 2021-04-08 PROCEDURE — 0002A COVID-19, MRNA, LNP-S, PF, 30 MCG/0.3 ML DOSE VACCINE: ICD-10-PCS | Mod: CV19,S$GLB,, | Performed by: FAMILY MEDICINE

## 2021-04-20 ENCOUNTER — TELEPHONE (OUTPATIENT)
Dept: INTERNAL MEDICINE | Facility: CLINIC | Age: 41
End: 2021-04-20

## 2021-04-20 ENCOUNTER — OFFICE VISIT (OUTPATIENT)
Dept: INTERNAL MEDICINE | Facility: CLINIC | Age: 41
End: 2021-04-20
Payer: COMMERCIAL

## 2021-04-20 DIAGNOSIS — M54.2 NECK PAIN: Primary | ICD-10-CM

## 2021-04-20 DIAGNOSIS — M89.8X1 PAIN OF LEFT SCAPULA: Primary | ICD-10-CM

## 2021-04-20 PROCEDURE — 99499 NO LOS: ICD-10-PCS | Mod: 95,,, | Performed by: INTERNAL MEDICINE

## 2021-04-20 PROCEDURE — 99499 UNLISTED E&M SERVICE: CPT | Mod: 95,,, | Performed by: INTERNAL MEDICINE

## 2021-04-27 ENCOUNTER — HOSPITAL ENCOUNTER (OUTPATIENT)
Dept: RADIOLOGY | Facility: HOSPITAL | Age: 41
Discharge: HOME | End: 2021-04-27
Attending: INTERNAL MEDICINE
Payer: COMMERCIAL

## 2021-04-27 DIAGNOSIS — N60.02 BREAST CYST, LEFT: ICD-10-CM

## 2021-04-27 DIAGNOSIS — N63.20 LUMP OF LEFT BREAST: ICD-10-CM

## 2021-04-27 DIAGNOSIS — N63.20 LEFT BREAST LUMP: ICD-10-CM

## 2021-04-27 PROCEDURE — 77062 BREAST TOMOSYNTHESIS BI: CPT | Mod: 26,,, | Performed by: RADIOLOGY

## 2021-04-27 PROCEDURE — 77066 MAMMO DIGITAL DIAGNOSTIC BILAT WITH TOMO: ICD-10-PCS | Mod: 26,,, | Performed by: RADIOLOGY

## 2021-04-27 PROCEDURE — 76642 US BREAST LEFT LIMITED: ICD-10-PCS | Mod: 26,LT,, | Performed by: RADIOLOGY

## 2021-04-27 PROCEDURE — 77066 DX MAMMO INCL CAD BI: CPT | Mod: TC

## 2021-04-27 PROCEDURE — 76642 ULTRASOUND BREAST LIMITED: CPT | Mod: TC,LT

## 2021-04-27 PROCEDURE — 76642 ULTRASOUND BREAST LIMITED: CPT | Mod: 26,LT,, | Performed by: RADIOLOGY

## 2021-04-27 PROCEDURE — 77066 DX MAMMO INCL CAD BI: CPT | Mod: 26,,, | Performed by: RADIOLOGY

## 2021-04-27 PROCEDURE — 77062 MAMMO DIGITAL DIAGNOSTIC BILAT WITH TOMO: ICD-10-PCS | Mod: 26,,, | Performed by: RADIOLOGY

## 2021-06-13 NOTE — TELEPHONE ENCOUNTER
Called and spoke to Patient.Informed her to please take 800-1000 iu daily of vitamin D3 which can be obtained over the counter until she is seen by her PCP.She verbalized understanding of instructions given.   · UA with 30-50 WBC, small leukocytes, RBCs and blood  · Urine culture pending

## 2021-07-06 ENCOUNTER — PATIENT MESSAGE (OUTPATIENT)
Dept: ADMINISTRATIVE | Facility: HOSPITAL | Age: 41
End: 2021-07-06

## 2021-10-05 ENCOUNTER — PATIENT MESSAGE (OUTPATIENT)
Dept: ADMINISTRATIVE | Facility: HOSPITAL | Age: 41
End: 2021-10-05

## 2021-10-27 NOTE — TELEPHONE ENCOUNTER
----- Message from Lashay Maynard MA sent at 2/20/2020  9:03 AM CST -----  Pt called wrong department wanting her CT scan results.    [Negative] : Heme/Lymph

## 2021-11-16 ENCOUNTER — OFFICE VISIT (OUTPATIENT)
Dept: URGENT CARE | Facility: CLINIC | Age: 41
End: 2021-11-16
Payer: COMMERCIAL

## 2021-11-16 VITALS
HEART RATE: 88 BPM | TEMPERATURE: 98 F | SYSTOLIC BLOOD PRESSURE: 117 MMHG | OXYGEN SATURATION: 98 % | RESPIRATION RATE: 18 BRPM | DIASTOLIC BLOOD PRESSURE: 80 MMHG

## 2021-11-16 DIAGNOSIS — R10.31 RIGHT LOWER QUADRANT ABDOMINAL PAIN: Primary | ICD-10-CM

## 2021-11-16 PROCEDURE — 3074F PR MOST RECENT SYSTOLIC BLOOD PRESSURE < 130 MM HG: ICD-10-PCS | Mod: CPTII,S$GLB,, | Performed by: FAMILY MEDICINE

## 2021-11-16 PROCEDURE — 3079F DIAST BP 80-89 MM HG: CPT | Mod: CPTII,S$GLB,, | Performed by: FAMILY MEDICINE

## 2021-11-16 PROCEDURE — 1159F MED LIST DOCD IN RCRD: CPT | Mod: CPTII,S$GLB,, | Performed by: FAMILY MEDICINE

## 2021-11-16 PROCEDURE — 3074F SYST BP LT 130 MM HG: CPT | Mod: CPTII,S$GLB,, | Performed by: FAMILY MEDICINE

## 2021-11-16 PROCEDURE — 99213 OFFICE O/P EST LOW 20 MIN: CPT | Mod: S$GLB,,, | Performed by: FAMILY MEDICINE

## 2021-11-16 PROCEDURE — 99213 PR OFFICE/OUTPT VISIT, EST, LEVL III, 20-29 MIN: ICD-10-PCS | Mod: S$GLB,,, | Performed by: FAMILY MEDICINE

## 2021-11-16 PROCEDURE — 1159F PR MEDICATION LIST DOCUMENTED IN MEDICAL RECORD: ICD-10-PCS | Mod: CPTII,S$GLB,, | Performed by: FAMILY MEDICINE

## 2021-11-16 PROCEDURE — 3079F PR MOST RECENT DIASTOLIC BLOOD PRESSURE 80-89 MM HG: ICD-10-PCS | Mod: CPTII,S$GLB,, | Performed by: FAMILY MEDICINE

## 2021-11-16 RX ORDER — DICYCLOMINE HYDROCHLORIDE 20 MG/1
20 TABLET ORAL EVERY 6 HOURS
Qty: 60 TABLET | Refills: 1 | Status: SHIPPED | OUTPATIENT
Start: 2021-11-16 | End: 2021-12-16

## 2021-11-16 RX ORDER — PANTOPRAZOLE SODIUM 40 MG/1
40 TABLET, DELAYED RELEASE ORAL DAILY
Qty: 90 TABLET | Refills: 1 | Status: SHIPPED | OUTPATIENT
Start: 2021-11-16 | End: 2022-01-26

## 2021-11-17 ENCOUNTER — HOSPITAL ENCOUNTER (EMERGENCY)
Facility: HOSPITAL | Age: 41
Discharge: HOME | End: 2021-11-17
Attending: EMERGENCY MEDICINE
Payer: COMMERCIAL

## 2021-11-17 VITALS
TEMPERATURE: 99 F | OXYGEN SATURATION: 99 % | HEIGHT: 64 IN | SYSTOLIC BLOOD PRESSURE: 112 MMHG | HEART RATE: 102 BPM | RESPIRATION RATE: 20 BRPM | WEIGHT: 185 LBS | DIASTOLIC BLOOD PRESSURE: 78 MMHG | BODY MASS INDEX: 31.58 KG/M2

## 2021-11-17 DIAGNOSIS — K44.9 HIATAL HERNIA: ICD-10-CM

## 2021-11-17 DIAGNOSIS — K58.9 IRRITABLE BOWEL SYNDROME, UNSPECIFIED TYPE: Primary | ICD-10-CM

## 2021-11-17 LAB
ALBUMIN SERPL BCP-MCNC: 3.8 G/DL (ref 3.5–5.2)
ALP SERPL-CCNC: 98 U/L (ref 55–135)
ALT SERPL W/O P-5'-P-CCNC: 16 U/L (ref 10–44)
ANION GAP SERPL CALC-SCNC: 12 MMOL/L (ref 8–16)
AST SERPL-CCNC: 20 U/L (ref 10–40)
B-HCG UR QL: NEGATIVE
BASOPHILS # BLD AUTO: 0.04 K/UL (ref 0–0.2)
BASOPHILS NFR BLD: 0.4 % (ref 0–1.9)
BILIRUB SERPL-MCNC: 1.5 MG/DL (ref 0.1–1)
BILIRUB UR QL STRIP: NEGATIVE
BUN SERPL-MCNC: 8 MG/DL (ref 6–20)
CALCIUM SERPL-MCNC: 8.8 MG/DL (ref 8.7–10.5)
CHLORIDE SERPL-SCNC: 105 MMOL/L (ref 95–110)
CLARITY UR: CLEAR
CO2 SERPL-SCNC: 14 MMOL/L (ref 23–29)
COLOR UR: YELLOW
CREAT SERPL-MCNC: 0.9 MG/DL (ref 0.5–1.4)
CTP QC/QA: YES
DIFFERENTIAL METHOD: ABNORMAL
EOSINOPHIL # BLD AUTO: 0 K/UL (ref 0–0.5)
EOSINOPHIL NFR BLD: 0.1 % (ref 0–8)
ERYTHROCYTE [DISTWIDTH] IN BLOOD BY AUTOMATED COUNT: 12.8 % (ref 11.5–14.5)
EST. GFR  (AFRICAN AMERICAN): >60 ML/MIN/1.73 M^2
EST. GFR  (NON AFRICAN AMERICAN): >60 ML/MIN/1.73 M^2
GLUCOSE SERPL-MCNC: 97 MG/DL (ref 70–110)
GLUCOSE UR QL STRIP: NEGATIVE
HCT VFR BLD AUTO: 42.8 % (ref 37–48.5)
HGB BLD-MCNC: 14.2 G/DL (ref 12–16)
HGB UR QL STRIP: ABNORMAL
HYALINE CASTS #/AREA URNS LPF: 1 /LPF
IMM GRANULOCYTES # BLD AUTO: 0.02 K/UL (ref 0–0.04)
IMM GRANULOCYTES NFR BLD AUTO: 0.2 % (ref 0–0.5)
KETONES UR QL STRIP: NEGATIVE
LEUKOCYTE ESTERASE UR QL STRIP: NEGATIVE
LIPASE SERPL-CCNC: 24 U/L (ref 4–60)
LYMPHOCYTES # BLD AUTO: 1.3 K/UL (ref 1–4.8)
LYMPHOCYTES NFR BLD: 14.8 % (ref 18–48)
MCH RBC QN AUTO: 27.4 PG (ref 27–31)
MCHC RBC AUTO-ENTMCNC: 33.2 G/DL (ref 32–36)
MCV RBC AUTO: 83 FL (ref 82–98)
MICROSCOPIC COMMENT: NORMAL
MONOCYTES # BLD AUTO: 0.5 K/UL (ref 0.3–1)
MONOCYTES NFR BLD: 5.7 % (ref 4–15)
NEUTROPHILS # BLD AUTO: 7 K/UL (ref 1.8–7.7)
NEUTROPHILS NFR BLD: 78.8 % (ref 38–73)
NITRITE UR QL STRIP: NEGATIVE
NRBC BLD-RTO: 0 /100 WBC
PH UR STRIP: 6 [PH] (ref 5–8)
PLATELET # BLD AUTO: 239 K/UL (ref 150–450)
PMV BLD AUTO: 9.1 FL (ref 9.2–12.9)
POTASSIUM SERPL-SCNC: 4.7 MMOL/L (ref 3.5–5.1)
PROT SERPL-MCNC: 8 G/DL (ref 6–8.4)
PROT UR QL STRIP: ABNORMAL
RBC # BLD AUTO: 5.19 M/UL (ref 4–5.4)
RBC #/AREA URNS HPF: 0 /HPF (ref 0–4)
SODIUM SERPL-SCNC: 131 MMOL/L (ref 136–145)
SP GR UR STRIP: 1.02 (ref 1–1.03)
SQUAMOUS #/AREA URNS HPF: 5 /HPF
URN SPEC COLLECT METH UR: ABNORMAL
UROBILINOGEN UR STRIP-ACNC: NEGATIVE EU/DL
WBC # BLD AUTO: 8.89 K/UL (ref 3.9–12.7)
WBC #/AREA URNS HPF: 1 /HPF (ref 0–5)

## 2021-11-17 PROCEDURE — 85025 COMPLETE CBC W/AUTO DIFF WBC: CPT | Performed by: PHYSICIAN ASSISTANT

## 2021-11-17 PROCEDURE — 81000 URINALYSIS NONAUTO W/SCOPE: CPT | Performed by: PHYSICIAN ASSISTANT

## 2021-11-17 PROCEDURE — 96361 HYDRATE IV INFUSION ADD-ON: CPT

## 2021-11-17 PROCEDURE — 63600175 PHARM REV CODE 636 W HCPCS: Performed by: STUDENT IN AN ORGANIZED HEALTH CARE EDUCATION/TRAINING PROGRAM

## 2021-11-17 PROCEDURE — 81025 URINE PREGNANCY TEST: CPT | Performed by: PHYSICIAN ASSISTANT

## 2021-11-17 PROCEDURE — 25500020 PHARM REV CODE 255: Performed by: EMERGENCY MEDICINE

## 2021-11-17 PROCEDURE — 99285 EMERGENCY DEPT VISIT HI MDM: CPT | Mod: 25

## 2021-11-17 PROCEDURE — 80053 COMPREHEN METABOLIC PANEL: CPT | Performed by: PHYSICIAN ASSISTANT

## 2021-11-17 PROCEDURE — 96374 THER/PROPH/DIAG INJ IV PUSH: CPT

## 2021-11-17 PROCEDURE — 83690 ASSAY OF LIPASE: CPT | Performed by: PHYSICIAN ASSISTANT

## 2021-11-17 PROCEDURE — 25000003 PHARM REV CODE 250: Performed by: STUDENT IN AN ORGANIZED HEALTH CARE EDUCATION/TRAINING PROGRAM

## 2021-11-17 RX ORDER — ONDANSETRON 2 MG/ML
4 INJECTION INTRAMUSCULAR; INTRAVENOUS
Status: COMPLETED | OUTPATIENT
Start: 2021-11-17 | End: 2021-11-17

## 2021-11-17 RX ORDER — TRAMADOL HYDROCHLORIDE AND ACETAMINOPHEN 37.5; 325 MG/1; MG/1
1 TABLET, FILM COATED ORAL EVERY 6 HOURS PRN
Qty: 9 TABLET | Refills: 0 | Status: SHIPPED | OUTPATIENT
Start: 2021-11-17 | End: 2022-01-26

## 2021-11-17 RX ADMIN — SODIUM CHLORIDE 1000 ML: 0.9 INJECTION, SOLUTION INTRAVENOUS at 02:11

## 2021-11-17 RX ADMIN — ONDANSETRON 4 MG: 2 INJECTION INTRAMUSCULAR; INTRAVENOUS at 02:11

## 2021-11-17 RX ADMIN — IOHEXOL 75 ML: 350 INJECTION, SOLUTION INTRAVENOUS at 03:11

## 2021-12-29 ENCOUNTER — OFFICE VISIT (OUTPATIENT)
Dept: INTERNAL MEDICINE | Facility: CLINIC | Age: 41
End: 2021-12-29
Payer: COMMERCIAL

## 2021-12-29 ENCOUNTER — HOSPITAL ENCOUNTER (OUTPATIENT)
Dept: RADIOLOGY | Facility: OTHER | Age: 41
Discharge: HOME | End: 2021-12-29
Attending: INTERNAL MEDICINE
Payer: COMMERCIAL

## 2021-12-29 VITALS
SYSTOLIC BLOOD PRESSURE: 110 MMHG | DIASTOLIC BLOOD PRESSURE: 70 MMHG | TEMPERATURE: 98 F | HEART RATE: 86 BPM | WEIGHT: 186.94 LBS | HEIGHT: 64 IN | BODY MASS INDEX: 31.91 KG/M2 | OXYGEN SATURATION: 99 %

## 2021-12-29 DIAGNOSIS — R10.11 RUQ PAIN: Primary | ICD-10-CM

## 2021-12-29 DIAGNOSIS — Z00.00 WELLNESS EXAMINATION: ICD-10-CM

## 2021-12-29 DIAGNOSIS — R10.11 RUQ PAIN: ICD-10-CM

## 2021-12-29 PROCEDURE — 3078F DIAST BP <80 MM HG: CPT | Mod: CPTII,S$GLB,, | Performed by: INTERNAL MEDICINE

## 2021-12-29 PROCEDURE — 76700 US ABDOMEN COMPLETE: ICD-10-PCS | Mod: 26,,, | Performed by: INTERNAL MEDICINE

## 2021-12-29 PROCEDURE — 3074F PR MOST RECENT SYSTOLIC BLOOD PRESSURE < 130 MM HG: ICD-10-PCS | Mod: CPTII,S$GLB,, | Performed by: INTERNAL MEDICINE

## 2021-12-29 PROCEDURE — 1159F PR MEDICATION LIST DOCUMENTED IN MEDICAL RECORD: ICD-10-PCS | Mod: CPTII,S$GLB,, | Performed by: INTERNAL MEDICINE

## 2021-12-29 PROCEDURE — 3008F PR BODY MASS INDEX (BMI) DOCUMENTED: ICD-10-PCS | Mod: CPTII,S$GLB,, | Performed by: INTERNAL MEDICINE

## 2021-12-29 PROCEDURE — 76700 US EXAM ABDOM COMPLETE: CPT | Mod: 26,,, | Performed by: INTERNAL MEDICINE

## 2021-12-29 PROCEDURE — 3074F SYST BP LT 130 MM HG: CPT | Mod: CPTII,S$GLB,, | Performed by: INTERNAL MEDICINE

## 2021-12-29 PROCEDURE — 1159F MED LIST DOCD IN RCRD: CPT | Mod: CPTII,S$GLB,, | Performed by: INTERNAL MEDICINE

## 2021-12-29 PROCEDURE — 3078F PR MOST RECENT DIASTOLIC BLOOD PRESSURE < 80 MM HG: ICD-10-PCS | Mod: CPTII,S$GLB,, | Performed by: INTERNAL MEDICINE

## 2021-12-29 PROCEDURE — 99213 OFFICE O/P EST LOW 20 MIN: CPT | Mod: S$GLB,,, | Performed by: INTERNAL MEDICINE

## 2021-12-29 PROCEDURE — 99999 PR PBB SHADOW E&M-EST. PATIENT-LVL V: CPT | Mod: PBBFAC,,, | Performed by: INTERNAL MEDICINE

## 2021-12-29 PROCEDURE — 76700 US EXAM ABDOM COMPLETE: CPT | Mod: TC

## 2021-12-29 PROCEDURE — 3008F BODY MASS INDEX DOCD: CPT | Mod: CPTII,S$GLB,, | Performed by: INTERNAL MEDICINE

## 2021-12-29 PROCEDURE — 99999 PR PBB SHADOW E&M-EST. PATIENT-LVL V: ICD-10-PCS | Mod: PBBFAC,,, | Performed by: INTERNAL MEDICINE

## 2021-12-29 PROCEDURE — 99213 PR OFFICE/OUTPT VISIT, EST, LEVL III, 20-29 MIN: ICD-10-PCS | Mod: S$GLB,,, | Performed by: INTERNAL MEDICINE

## 2021-12-29 RX ORDER — HYOSCYAMINE SULFATE 0.125 MG
TABLET ORAL
Qty: 90 TABLET | Refills: 1 | Status: SHIPPED | OUTPATIENT
Start: 2021-12-29 | End: 2022-01-26

## 2021-12-29 NOTE — PROGRESS NOTES
Subjective:       Patient ID: Venkatesh Pritchett is a 41 y.o. female.    Chief Complaint: Leg Pain and Follow-up    HPIPt reports RUQ pain and R back pain with some pain into the R leg - no numbness or weakness - no bowel or bladder incontinence.  No N/V.  Was in ED 6 weeks ago with abdominal pain with normal CT abd and pelvis.  She denies pregnancy.   Review of Systems   Respiratory: Negative for shortness of breath (PND or orthopnea).    Cardiovascular: Negative for chest pain (arm pain or jaw pain).   Gastrointestinal: Positive for abdominal pain. Negative for diarrhea, nausea and vomiting.   Genitourinary: Negative for dysuria.   Musculoskeletal: Positive for back pain.   Neurological: Negative for seizures, syncope and headaches.       Objective:      Physical Exam  Constitutional:       General: She is not in acute distress.     Appearance: She is well-developed.   HENT:      Head: Normocephalic.   Neck:      Thyroid: No thyromegaly.      Vascular: No JVD.   Cardiovascular:      Rate and Rhythm: Normal rate and regular rhythm.      Heart sounds: Normal heart sounds. No murmur heard.  No friction rub. No gallop.    Pulmonary:      Effort: Pulmonary effort is normal.      Breath sounds: Normal breath sounds. No wheezing or rales.   Abdominal:      General: Bowel sounds are normal. There is no distension.      Palpations: Abdomen is soft. There is no mass.      Tenderness: There is abdominal tenderness (RUQ). There is no guarding or rebound.   Musculoskeletal:      Cervical back: Neck supple.   Lymphadenopathy:      Cervical: No cervical adenopathy.   Skin:     General: Skin is warm and dry.   Neurological:      Mental Status: She is alert and oriented to person, place, and time.      Deep Tendon Reflexes: Reflexes are normal and symmetric.   Psychiatric:         Behavior: Behavior normal.         Thought Content: Thought content normal.         Judgment: Judgment normal.       5/5 strength in BLE, 2+reflexes at  knees and ankles bilaterally, neg SLR bilaterally, easily walks on heels and toes    Assessment:       1. RUQ pain    2. Wellness examination        Plan:   RUQ pain  -     US Abdomen Complete; Future; Expected date: 12/29/2021    Wellness examination  -     CBC Auto Differential; Future; Expected date: 12/29/2021  -     Comprehensive Metabolic Panel; Future; Expected date: 12/29/2021  -     Lipid Panel; Future; Expected date: 12/29/2021  -     TSH; Future; Expected date: 12/29/2021  -     Hemoglobin A1C; Future; Expected date: 12/29/2021  -     CALCITRIOL; Future; Expected date: 12/29/2021  -     Sedimentation rate; Future; Expected date: 12/29/2021  -     C-Reactive Protein; Future; Expected date: 12/29/2021  -     GIBRAN Screen w/Reflex; Future; Expected date: 12/29/2021  -     Rheumatoid Factor; Future; Expected date: 12/29/2021  -     Cyclic Citrullinated Peptide Antibody, IgG; Future; Expected date: 12/29/2021  -     Ambulatory referral/consult to Physical/Occupational Therapy; Future; Expected date: 01/05/2022    Other orders  -     hyoscyamine (ANASPAZ,LEVSIN) 0.125 mg Tab; One tablet three times daily as needed for IBS cramping  Dispense: 90 tablet; Refill: 1      We discussed possible Irritable bowel and need for good hydration, fiber, levsin given.  Try probiotics too - consider gastro consult.    PT for musculoskeletal complaints.

## 2021-12-30 ENCOUNTER — PATIENT MESSAGE (OUTPATIENT)
Dept: INTERNAL MEDICINE | Facility: CLINIC | Age: 41
End: 2021-12-30
Payer: COMMERCIAL

## 2022-01-11 ENCOUNTER — OFFICE VISIT (OUTPATIENT)
Dept: URGENT CARE | Facility: CLINIC | Age: 42
End: 2022-01-11
Payer: COMMERCIAL

## 2022-01-11 VITALS
SYSTOLIC BLOOD PRESSURE: 116 MMHG | WEIGHT: 185 LBS | HEART RATE: 90 BPM | DIASTOLIC BLOOD PRESSURE: 80 MMHG | OXYGEN SATURATION: 98 % | BODY MASS INDEX: 31.58 KG/M2 | TEMPERATURE: 99 F | HEIGHT: 64 IN | RESPIRATION RATE: 16 BRPM

## 2022-01-11 DIAGNOSIS — J02.9 SORE THROAT: Primary | ICD-10-CM

## 2022-01-11 LAB
CTP QC/QA: YES
SARS-COV-2 RDRP RESP QL NAA+PROBE: NEGATIVE

## 2022-01-11 PROCEDURE — U0002 COVID-19 LAB TEST NON-CDC: HCPCS | Mod: QW,S$GLB,, | Performed by: FAMILY MEDICINE

## 2022-01-11 PROCEDURE — 3008F PR BODY MASS INDEX (BMI) DOCUMENTED: ICD-10-PCS | Mod: CPTII,S$GLB,, | Performed by: FAMILY MEDICINE

## 2022-01-11 PROCEDURE — 3079F PR MOST RECENT DIASTOLIC BLOOD PRESSURE 80-89 MM HG: ICD-10-PCS | Mod: CPTII,S$GLB,, | Performed by: FAMILY MEDICINE

## 2022-01-11 PROCEDURE — 3074F PR MOST RECENT SYSTOLIC BLOOD PRESSURE < 130 MM HG: ICD-10-PCS | Mod: CPTII,S$GLB,, | Performed by: FAMILY MEDICINE

## 2022-01-11 PROCEDURE — 3074F SYST BP LT 130 MM HG: CPT | Mod: CPTII,S$GLB,, | Performed by: FAMILY MEDICINE

## 2022-01-11 PROCEDURE — 3008F BODY MASS INDEX DOCD: CPT | Mod: CPTII,S$GLB,, | Performed by: FAMILY MEDICINE

## 2022-01-11 PROCEDURE — 99213 PR OFFICE/OUTPT VISIT, EST, LEVL III, 20-29 MIN: ICD-10-PCS | Mod: S$GLB,,, | Performed by: FAMILY MEDICINE

## 2022-01-11 PROCEDURE — 3079F DIAST BP 80-89 MM HG: CPT | Mod: CPTII,S$GLB,, | Performed by: FAMILY MEDICINE

## 2022-01-11 PROCEDURE — 99213 OFFICE O/P EST LOW 20 MIN: CPT | Mod: S$GLB,,, | Performed by: FAMILY MEDICINE

## 2022-01-11 PROCEDURE — U0002: ICD-10-PCS | Mod: QW,S$GLB,, | Performed by: FAMILY MEDICINE

## 2022-01-11 PROCEDURE — 1159F MED LIST DOCD IN RCRD: CPT | Mod: CPTII,S$GLB,, | Performed by: FAMILY MEDICINE

## 2022-01-11 PROCEDURE — 1159F PR MEDICATION LIST DOCUMENTED IN MEDICAL RECORD: ICD-10-PCS | Mod: CPTII,S$GLB,, | Performed by: FAMILY MEDICINE

## 2022-01-12 NOTE — PATIENT INSTRUCTIONS
You may leave home and/or return to work when the following conditions are met:  · 24 hours fever free without fever-reducing medications AND  · Improved symptoms  · You are fully vaccinated or have not had close contact with someone with COVID-19 (within 6 feet for 15 minutes or more)    If you are fully vaccinated and had a close contact, there is no need for quarantine, unless you develop symptoms.      If you are not fully vaccinated and had a close contact:  · Retest at 5 to 7 days post-exposure  · If possible, it is recommended that you quarantine for 5 days from the time of contact regardless of your test status.  · A mask should be worn indoors post quarantine.

## 2022-01-14 ENCOUNTER — OFFICE VISIT (OUTPATIENT)
Dept: URGENT CARE | Facility: CLINIC | Age: 42
End: 2022-01-14
Payer: COMMERCIAL

## 2022-01-14 VITALS
SYSTOLIC BLOOD PRESSURE: 119 MMHG | HEIGHT: 64 IN | TEMPERATURE: 100 F | OXYGEN SATURATION: 97 % | WEIGHT: 185 LBS | RESPIRATION RATE: 19 BRPM | BODY MASS INDEX: 31.58 KG/M2 | DIASTOLIC BLOOD PRESSURE: 83 MMHG | HEART RATE: 101 BPM

## 2022-01-14 DIAGNOSIS — U07.1 COVID-19: Primary | ICD-10-CM

## 2022-01-14 LAB
CTP QC/QA: YES
SARS-COV-2 RDRP RESP QL NAA+PROBE: POSITIVE

## 2022-01-14 PROCEDURE — 1159F PR MEDICATION LIST DOCUMENTED IN MEDICAL RECORD: ICD-10-PCS | Mod: CPTII,S$GLB,, | Performed by: NURSE PRACTITIONER

## 2022-01-14 PROCEDURE — U0002 COVID-19 LAB TEST NON-CDC: HCPCS | Mod: QW,S$GLB,, | Performed by: NURSE PRACTITIONER

## 2022-01-14 PROCEDURE — 1160F RVW MEDS BY RX/DR IN RCRD: CPT | Mod: CPTII,S$GLB,, | Performed by: NURSE PRACTITIONER

## 2022-01-14 PROCEDURE — 3074F PR MOST RECENT SYSTOLIC BLOOD PRESSURE < 130 MM HG: ICD-10-PCS | Mod: CPTII,S$GLB,, | Performed by: NURSE PRACTITIONER

## 2022-01-14 PROCEDURE — 3079F PR MOST RECENT DIASTOLIC BLOOD PRESSURE 80-89 MM HG: ICD-10-PCS | Mod: CPTII,S$GLB,, | Performed by: NURSE PRACTITIONER

## 2022-01-14 PROCEDURE — 3008F BODY MASS INDEX DOCD: CPT | Mod: CPTII,S$GLB,, | Performed by: NURSE PRACTITIONER

## 2022-01-14 PROCEDURE — 3074F SYST BP LT 130 MM HG: CPT | Mod: CPTII,S$GLB,, | Performed by: NURSE PRACTITIONER

## 2022-01-14 PROCEDURE — 99213 OFFICE O/P EST LOW 20 MIN: CPT | Mod: S$GLB,,, | Performed by: NURSE PRACTITIONER

## 2022-01-14 PROCEDURE — U0002: ICD-10-PCS | Mod: QW,S$GLB,, | Performed by: NURSE PRACTITIONER

## 2022-01-14 PROCEDURE — 3008F PR BODY MASS INDEX (BMI) DOCUMENTED: ICD-10-PCS | Mod: CPTII,S$GLB,, | Performed by: NURSE PRACTITIONER

## 2022-01-14 PROCEDURE — 1159F MED LIST DOCD IN RCRD: CPT | Mod: CPTII,S$GLB,, | Performed by: NURSE PRACTITIONER

## 2022-01-14 PROCEDURE — 3079F DIAST BP 80-89 MM HG: CPT | Mod: CPTII,S$GLB,, | Performed by: NURSE PRACTITIONER

## 2022-01-14 PROCEDURE — 99213 PR OFFICE/OUTPT VISIT, EST, LEVL III, 20-29 MIN: ICD-10-PCS | Mod: S$GLB,,, | Performed by: NURSE PRACTITIONER

## 2022-01-14 PROCEDURE — 1160F PR REVIEW ALL MEDS BY PRESCRIBER/CLIN PHARMACIST DOCUMENTED: ICD-10-PCS | Mod: CPTII,S$GLB,, | Performed by: NURSE PRACTITIONER

## 2022-01-14 NOTE — PROGRESS NOTES
"Subjective:       Patient ID: Venkatesh Pritchett is a 41 y.o. female.    Vitals:  height is 5' 4" (1.626 m) and weight is 83.9 kg (185 lb). Her temperature is 100 °F (37.8 °C). Her blood pressure is 119/83 and her pulse is 101. Her respiration is 19 and oxygen saturation is 97%.     Chief Complaint: URI and Back Pain    This is a 41 y.o. female who presents today with a chief complaint of  muscle ache and upper back pain with body aches started yesterday, patient reports she is vaccinated, patient reports her son tested positive COVID-19,patient denies fever,  or chills, denies cough, wheezing or shortness of breath, denies nausea, vomiting, diarrhea or abdominal pain, denies chest pain or dizziness positional lightheadedness, denies sore throat or trouble swallowing, denies loss of taste or smell, or any other symptoms      URI   This is a new problem. The current episode started in the past 7 days. The problem has been unchanged. There has been no fever. Pertinent negatives include no abdominal pain, chest pain, congestion, coughing, diarrhea, nausea, vomiting or wheezing. Treatments tried: Tylenol. The treatment provided mild relief.       Constitution: Negative for chills, sweating, fatigue and fever.   HENT: Negative for congestion.    Cardiovascular: Negative for chest pain.   Respiratory: Negative for chest tightness, cough, sputum production, shortness of breath and wheezing.    Gastrointestinal: Negative for abdominal pain, nausea, vomiting and diarrhea.   Musculoskeletal: Positive for muscle ache.        Body aches and upper back pain muscle ache   Allergic/Immunologic: Negative for environmental allergies and seasonal allergies.   Neurological: Negative for dizziness.       Objective:      Physical Exam   Constitutional: She is oriented to person, place, and time. She appears well-developed and well-nourished. She is cooperative.  Non-toxic appearance. She does not have a sickly appearance. She does not " appear ill. No distress.   HENT:   Head: Normocephalic and atraumatic.   Ears:   Right Ear: Hearing, tympanic membrane, external ear and ear canal normal.   Left Ear: Hearing, tympanic membrane, external ear and ear canal normal.   Nose: Nose normal. No mucosal edema, rhinorrhea or nasal deformity. No epistaxis. Right sinus exhibits no maxillary sinus tenderness and no frontal sinus tenderness. Left sinus exhibits no maxillary sinus tenderness and no frontal sinus tenderness.   Mouth/Throat: Uvula is midline, oropharynx is clear and moist and mucous membranes are normal. No trismus in the jaw. Normal dentition. No uvula swelling. No oropharyngeal exudate, posterior oropharyngeal edema or posterior oropharyngeal erythema.   Eyes: Conjunctivae and lids are normal. No scleral icterus.   Neck: Trachea normal and phonation normal. Neck supple. No edema present. No erythema present. No neck rigidity present.   Cardiovascular: Normal rate, regular rhythm, normal heart sounds, intact distal pulses and normal pulses.   Pulmonary/Chest: Effort normal and breath sounds normal. No stridor. No respiratory distress. She has no decreased breath sounds. She has no wheezes. She has no rhonchi. She has no rales.   Abdominal: Normal appearance.   Musculoskeletal: Normal range of motion.         General: No deformity or edema. Normal range of motion.      Thoracic back: Normal.      Lumbar back: Normal.   Neurological: She is alert and oriented to person, place, and time. She exhibits normal muscle tone. Coordination normal.   Skin: Skin is warm, dry, intact, not diaphoretic and not pale.   Psychiatric: She has a normal mood and affect. Her speech is normal and behavior is normal. Judgment and thought content normal. Cognition and memory  Nursing note and vitals reviewed.        Results for orders placed or performed in visit on 01/14/22   POCT COVID-19 Rapid Screening   Result Value Ref Range    POC Rapid COVID Positive (A) Negative      Acceptable Yes          Patient in no acute distress.  Vitals reassuring.  Positive COVID 19 results discussed with patient in detail.  Detailed education provided about COVID 19 precautions and recommendations as per CDC guidelines.  Risk of complication score 1.  Over-the-counter medications discussed.  Discussed results/diagnosis/plan in depth with patient in clinic. Strict precautions given to patient to monitor for worsening signs and symptoms. Advised to follow up with primary.All questions answered. Strict ER precautions given. If your symptoms worsens or fail to improve you should go to the Emergency Room. Discharge and follow-up instructions given verbally/printed. Discharge and follow-up instructions discussed with the patient who expressed understanding and willingness to comply with my recommendations.Patient voiced understanding and in agreement with current treatment plan.     Please be advised this text was dictated with [employer] software and may contain errors due to translation.      Assessment:       1. COVID-19          Plan:         COVID-19  -     POCT COVID-19 Rapid Screening      Patient Instructions     You have tested positive for COVID-19 today.        ISOLATION    If you tested positive and do not have symptoms, you must isolate for 5 days starting on the day of the positive test. I    If you tested positive and have symptoms, you must isolate for 5 days starting on the day of the first symptoms,  not the day of the positive test.    This is the most important part, both the CDC and the [facility] emphasize that you do not test out of isolation.    After 5 days, if your symptoms have improved and you have not had fever on day 5, you can return to the community on day 6- NO TESTING REQUIRED!     In fact, we do not retest if you were positive in the last 90 days.    After your 5 days of isolation are completed, the CDC recommends strict mask use for the first 5 days that you come  out of isolation    CDC Testing and Quarantine Guidelines for patients with exposure to a known-positive COVID-19 person:    ·     A 'close exposure' is defined as anyone who has had an exposure (masked or unmasked) to a known COVID -19 positive person            within 6 feet of someone            for a cumulative total of 15 minutes or more over a 24-hour period.    ·     vaccinated Have been boosted or completed the primary series of Pfizer or Moderna vaccine within the last 6 months or completed the primary series of J&J vaccine within the last 2 months and/or had a positive test within 90 days            do NOT need to quarantine after contact with someone who had COVID-19 unless they have symptoms.            fully vaccinated people who have not had a positive test within 90 days, should get tested 3-5 days after their exposure, even if they don't have symptoms and wear a mask indoors in public for 10 days following exposure or until their test result is negative on day 5.            If you develop symptoms test and quarantine.      ·     Unvaccinated, or are more than six months out from their second mRNA dose (or more than 2 months after the J&J vaccine) and not yet boosted,  and/or NOT had a positive test within 90 days and meet 'close exposure'    you are required by CDC guidelines to quarantine for at least 5 days from time of exposure followed by 5 days of strict masking. It is recommended, but not required to test after 5 days, unless you develop symptoms, in which case you should test at that time.    If you do decide to test at 5 days and are asymptomatic, the risk is that if you test without symptoms on Day 5 for example) and you are positive, your 5 day isolation begins on that day, and you turned your 5 day quarantine into 10 days.            If your exposure does not meet the above definition, you can return to your normal daily activities to include social distancing, wearing a mask and frequent  handwashing.    Alternatively, if a 5-day quarantine is not feasible, it is imperative that an exposed person wear a well-fitting mask at all times when around others for 10 days after exposure.            PLEASE READ YOUR DISCHARGE INSTRUCTIONS ENTIRELY AS IT CONTAINS IMPORTANT INFORMATION.      Please drink plenty of fluids.    Please get plenty of rest.    Please return here or go to the Emergency Department for any concerns or worsening of condition.    Please take an over the counter antihistamine medication (allegra/Claritin/Zyrtec) of your choice as directed.    Try an over the counter decongestant like Mucinex D or Sudafed. You buy this behind the pharmacy counter    If you do have Hypertension or palpitations, it is safe to take Coricidin HBP for relief of sinus symptoms.    If not allergic, please take over the counter Tylenol (Acetaminophen) and/or Motrin (Ibuprofen) as directed for control of pain and/or fever.  Please follow up with your primary care doctor or specialist as needed.    Sore throat recommendations: Warm fluids, warm salt water gargles, throat lozenges, tea, honey, soup, rest, hydration.    Use over the counter flonase: one spray each nostril twice daily OR two sprays each nostril once daily.     If you  smoke, please stop smoking.      Please return or see your primary care doctor if you develop new or worsening symptoms.     Please arrange follow up with your primary medical clinic as soon as possible. You must understand that you've received an Urgent Care treatment only and that you may be released before all of your medical problems are known or treated. You, the patient, will arrange for follow up as instructed. If your symptoms worsen or fail to improve you should go to the Emergency Room.  Patient Education       COVID-19 Discharge Instructions   About this topic   Coronavirus disease 2019 is also known as COVID-19. It is a viral illness that infects the lungs. It is caused by a  virus called SARS-associated coronavirus (SARS-CoV-2).  The signs of COVID-19 most often start a few days after you have been infected. In some people, it takes longer to show signs. Others never show signs of the infection. You may have a cough, fever, shaking chills and it may be hard to breathe. You may be very tired, have muscle aches, a headache or sore throat. Some people have an upset stomach or loose stools. Others lose their sense of smell or taste. You may not have these signs all the time and they may come and go while you are sick.  The virus spreads easily through droplets when you talk, sneeze, or cough. You can pass the virus to others when you are talking close together, singing, hugging, sharing food, or shaking hands. Doctors believe the germs also survive on surfaces like tables, door handles, and telephones. However, this is not a common way that COVID-19 spreads. Doctors believe you can also spread the infection even if you dont have any symptoms, but they do not know how that happens. This is why getting vaccinated is one of the best ways to keep you healthy and slow the spread of the virus.  Some people have a mild case of COVID-19 and are able to stay at home and away from others until they feel better. Others may need to be in the hospital if they are very sick. Some people with COVID-19 can have some symptoms for weeks or months. People with COVID-19 must isolate themselves. You can start to be around others when your doctor says it is safe to do so.       What care is needed at home?   · Ask your doctor what you need to do when you go home. Make sure you ask questions if you do not understand what the doctor says.  · Drink lots of water, juice, or broth to replace fluids lost from a fever.  · You may use cool mist humidifiers to help ease congestion and coughing.  · Use 2 to 3 pillows to prop yourself up when you lie down to make it easier to breathe and sleep.  · Do not smoke and do not  drink beer, wine, and mixed drinks (alcohol).  · To lower the chance of passing the infection to others, get a COVID-19 vaccine after your infection has resolved.  · If you have not been fully vaccinated:  ? Wear a mask over your mouth and nose if you are around others who are not sick. Cloth masks work best if they have more than one layer of fabric.  ? Wash your hands often.  ? Stay home in a separate room, if possible, away from others. Only go out to get medical care.  ? Use a separate bathroom if possible.  ? Do not make food for others.  What follow-up care is needed?   · Your doctor may ask you to make visits to the office to check on your progress. Be sure to keep these visits. Make sure you wear a mask at these visits.  · If you can, tell the staff you have COVID-19 ahead of time so they can take extra care to stop the disease from spreading.  · It may take a few weeks before your health returns to normal.  What drugs may be needed?   The doctor may order drugs to:  · Help with breathing  · Help with fever  · Help with swelling in your airways and lungs  · Control coughing  · Ease a sore throat  · Help a runny or stuffy nose  Will physical activity be limited?   You may have to limit your physical activity. Talk to your doctor about the right amount of activity for you. If you have been very sick with COVID-19, it can take some time to get your strength back.  Will there be any other care needed?   Doctors do not know how long you can pass the virus on to others after you are sick. This is why it is important to stay in a separate room, if possible, when you are sick. For now, doctors are giving general guidelines for you to follow after you have been sick. Before you go around other people, you should:  · Be fever free for 24 hours without taking any drugs to lower the fever  · Have no symptoms of cough or shortness of breath  · Wait at least 10 days after first having symptoms or your first positive test,  and you need to be symptom free as above. Some experts suggest waiting 20 days if you have had a more severe infection.  Talk with your doctor about getting a COVID-19 vaccine.  What problems could happen?   · Fluid loss. This is dehydration.  · Short-term or long-term lung damage  · Heart problems  · Death  When do I need to call the doctor?   · You are having so much trouble breathing that you can only say one or two words at a time.  · You need to sit upright at all times to be able to breathe and/or cannot lie down.  · You are very confused or cannot stay awake.  · Your lips or skin start to turn blue or grey.  · You think you might be having a medical emergency. Some examples of medical emergencies are:  ? Severe chest pain.  ? Not able to speak or move normally.  · You have trouble breathing when talking or sitting still.  · You have new shortness of breath.  · You become weak or dizzy.  · You have very dark urine or do not pass urine for more than 8 hours.  · You have new or worsening COVID-19 symptoms like:  ? Fever  ? Cough  ? Feeling very tired  ? Shaking chills  ? Headache  ? Trouble swallowing  ? Throwing up  ? Loose stools  ? Reddish purple spots on your fingers or toes  Teach Back: Helping You Understand   The Teach Back Method helps you understand the information we are giving you. After you talk with the staff, tell them in your own words what you learned. This helps to make sure the staff has described each thing clearly. It also helps to explain things that may have been confusing. Before going home, make sure you can do these:  · I can tell you about my condition.  · I can tell you what may help ease my breathing.  · I can tell you what I can do to help avoid passing the infection to others.  · I can tell you what I will do if I have trouble breathing; feel sleepy or confused; or my fingertips, fingernails, skin, or lips are blue.  Where can I learn more?   Centers for Disease Control and  Prevention  https://www.cdc.gov/coronavirus/2019-ncov/about/index.html   Centers for Disease Control and Prevention  https://www.cdc.gov/coronavirus/2019-ncov/hcp/disposition-in-home-patients.html   World Health Organization  https://www.who.int/news-room/q-a-detail/c-k-otdfxofbgwxoo   Last Reviewed Date   2021-10-05  Consumer Information Use and Disclaimer   This information is not specific medical advice and does not replace information you receive from your health care provider. This is only a brief summary of general information. It does NOT include all information about conditions, illnesses, injuries, tests, procedures, treatments, therapies, discharge instructions or life-style choices that may apply to you. You must talk with your health care provider for complete information about your health and treatment options. This information should not be used to decide whether or not to accept your health care providers advice, instructions or recommendations. Only your health care provider has the knowledge and training to provide advice that is right for you.  Copyright   Copyright © 2021 [employer] and its affiliates and/or licensors. All rights reserved.

## 2022-01-14 NOTE — PATIENT INSTRUCTIONS
You have tested positive for COVID-19 today.        ISOLATION    If you tested positive and do not have symptoms, you must isolate for 5 days starting on the day of the positive test. I    If you tested positive and have symptoms, you must isolate for 5 days starting on the day of the first symptoms,  not the day of the positive test.    This is the most important part, both the CDC and the [facility] emphasize that you do not test out of isolation.    After 5 days, if your symptoms have improved and you have not had fever on day 5, you can return to the community on day 6- NO TESTING REQUIRED!     In fact, we do not retest if you were positive in the last 90 days.    After your 5 days of isolation are completed, the CDC recommends strict mask use for the first 5 days that you come out of isolation    CDC Testing and Quarantine Guidelines for patients with exposure to a known-positive COVID-19 person:    ·     A 'close exposure' is defined as anyone who has had an exposure (masked or unmasked) to a known COVID -19 positive person            within 6 feet of someone            for a cumulative total of 15 minutes or more over a 24-hour period.    ·     vaccinated Have been boosted or completed the primary series of Pfizer or Moderna vaccine within the last 6 months or completed the primary series of J&J vaccine within the last 2 months and/or had a positive test within 90 days            do NOT need to quarantine after contact with someone who had COVID-19 unless they have symptoms.            fully vaccinated people who have not had a positive test within 90 days, should get tested 3-5 days after their exposure, even if they don't have symptoms and wear a mask indoors in public for 10 days following exposure or until their test result is negative on day 5.            If you develop symptoms test and quarantine.      ·     Unvaccinated, or are more than six months out from their second mRNA dose (or more than 2 months  after the J&J vaccine) and not yet boosted,  and/or NOT had a positive test within 90 days and meet 'close exposure'    you are required by CDC guidelines to quarantine for at least 5 days from time of exposure followed by 5 days of strict masking. It is recommended, but not required to test after 5 days, unless you develop symptoms, in which case you should test at that time.    If you do decide to test at 5 days and are asymptomatic, the risk is that if you test without symptoms on Day 5 for example) and you are positive, your 5 day isolation begins on that day, and you turned your 5 day quarantine into 10 days.            If your exposure does not meet the above definition, you can return to your normal daily activities to include social distancing, wearing a mask and frequent handwashing.    Alternatively, if a 5-day quarantine is not feasible, it is imperative that an exposed person wear a well-fitting mask at all times when around others for 10 days after exposure.            PLEASE READ YOUR DISCHARGE INSTRUCTIONS ENTIRELY AS IT CONTAINS IMPORTANT INFORMATION.      Please drink plenty of fluids.    Please get plenty of rest.    Please return here or go to the Emergency Department for any concerns or worsening of condition.    Please take an over the counter antihistamine medication (allegra/Claritin/Zyrtec) of your choice as directed.    Try an over the counter decongestant like Mucinex D or Sudafed. You buy this behind the pharmacy counter    If you do have Hypertension or palpitations, it is safe to take Coricidin HBP for relief of sinus symptoms.    If not allergic, please take over the counter Tylenol (Acetaminophen) and/or Motrin (Ibuprofen) as directed for control of pain and/or fever.  Please follow up with your primary care doctor or specialist as needed.    Sore throat recommendations: Warm fluids, warm salt water gargles, throat lozenges, tea, honey, soup, rest, hydration.    Use over the counter  flonase: one spray each nostril twice daily OR two sprays each nostril once daily.     If you  smoke, please stop smoking.      Please return or see your primary care doctor if you develop new or worsening symptoms.     Please arrange follow up with your primary medical clinic as soon as possible. You must understand that you've received an Urgent Care treatment only and that you may be released before all of your medical problems are known or treated. You, the patient, will arrange for follow up as instructed. If your symptoms worsen or fail to improve you should go to the Emergency Room.  Patient Education       COVID-19 Discharge Instructions   About this topic   Coronavirus disease 2019 is also known as COVID-19. It is a viral illness that infects the lungs. It is caused by a virus called SARS-associated coronavirus (SARS-CoV-2).  The signs of COVID-19 most often start a few days after you have been infected. In some people, it takes longer to show signs. Others never show signs of the infection. You may have a cough, fever, shaking chills and it may be hard to breathe. You may be very tired, have muscle aches, a headache or sore throat. Some people have an upset stomach or loose stools. Others lose their sense of smell or taste. You may not have these signs all the time and they may come and go while you are sick.  The virus spreads easily through droplets when you talk, sneeze, or cough. You can pass the virus to others when you are talking close together, singing, hugging, sharing food, or shaking hands. Doctors believe the germs also survive on surfaces like tables, door handles, and telephones. However, this is not a common way that COVID-19 spreads. Doctors believe you can also spread the infection even if you dont have any symptoms, but they do not know how that happens. This is why getting vaccinated is one of the best ways to keep you healthy and slow the spread of the virus.  Some people have a mild  case of COVID-19 and are able to stay at home and away from others until they feel better. Others may need to be in the hospital if they are very sick. Some people with COVID-19 can have some symptoms for weeks or months. People with COVID-19 must isolate themselves. You can start to be around others when your doctor says it is safe to do so.       What care is needed at home?   · Ask your doctor what you need to do when you go home. Make sure you ask questions if you do not understand what the doctor says.  · Drink lots of water, juice, or broth to replace fluids lost from a fever.  · You may use cool mist humidifiers to help ease congestion and coughing.  · Use 2 to 3 pillows to prop yourself up when you lie down to make it easier to breathe and sleep.  · Do not smoke and do not drink beer, wine, and mixed drinks (alcohol).  · To lower the chance of passing the infection to others, get a COVID-19 vaccine after your infection has resolved.  · If you have not been fully vaccinated:  ? Wear a mask over your mouth and nose if you are around others who are not sick. Cloth masks work best if they have more than one layer of fabric.  ? Wash your hands often.  ? Stay home in a separate room, if possible, away from others. Only go out to get medical care.  ? Use a separate bathroom if possible.  ? Do not make food for others.  What follow-up care is needed?   · Your doctor may ask you to make visits to the office to check on your progress. Be sure to keep these visits. Make sure you wear a mask at these visits.  · If you can, tell the staff you have COVID-19 ahead of time so they can take extra care to stop the disease from spreading.  · It may take a few weeks before your health returns to normal.  What drugs may be needed?   The doctor may order drugs to:  · Help with breathing  · Help with fever  · Help with swelling in your airways and lungs  · Control coughing  · Ease a sore throat  · Help a runny or stuffy nose  Will  physical activity be limited?   You may have to limit your physical activity. Talk to your doctor about the right amount of activity for you. If you have been very sick with COVID-19, it can take some time to get your strength back.  Will there be any other care needed?   Doctors do not know how long you can pass the virus on to others after you are sick. This is why it is important to stay in a separate room, if possible, when you are sick. For now, doctors are giving general guidelines for you to follow after you have been sick. Before you go around other people, you should:  · Be fever free for 24 hours without taking any drugs to lower the fever  · Have no symptoms of cough or shortness of breath  · Wait at least 10 days after first having symptoms or your first positive test, and you need to be symptom free as above. Some experts suggest waiting 20 days if you have had a more severe infection.  Talk with your doctor about getting a COVID-19 vaccine.  What problems could happen?   · Fluid loss. This is dehydration.  · Short-term or long-term lung damage  · Heart problems  · Death  When do I need to call the doctor?   · You are having so much trouble breathing that you can only say one or two words at a time.  · You need to sit upright at all times to be able to breathe and/or cannot lie down.  · You are very confused or cannot stay awake.  · Your lips or skin start to turn blue or grey.  · You think you might be having a medical emergency. Some examples of medical emergencies are:  ? Severe chest pain.  ? Not able to speak or move normally.  · You have trouble breathing when talking or sitting still.  · You have new shortness of breath.  · You become weak or dizzy.  · You have very dark urine or do not pass urine for more than 8 hours.  · You have new or worsening COVID-19 symptoms like:  ? Fever  ? Cough  ? Feeling very tired  ? Shaking chills  ? Headache  ? Trouble swallowing  ? Throwing up  ? Loose  stools  ? Reddish purple spots on your fingers or toes  Teach Back: Helping You Understand   The Teach Back Method helps you understand the information we are giving you. After you talk with the staff, tell them in your own words what you learned. This helps to make sure the staff has described each thing clearly. It also helps to explain things that may have been confusing. Before going home, make sure you can do these:  · I can tell you about my condition.  · I can tell you what may help ease my breathing.  · I can tell you what I can do to help avoid passing the infection to others.  · I can tell you what I will do if I have trouble breathing; feel sleepy or confused; or my fingertips, fingernails, skin, or lips are blue.  Where can I learn more?   Centers for Disease Control and Prevention  https://www.cdc.gov/coronavirus/2019-ncov/about/index.html   Centers for Disease Control and Prevention  https://www.cdc.gov/coronavirus/2019-ncov/hcp/disposition-in-home-patients.html   World Health Organization  https://www.who.int/news-room/q-a-detail/k-q-jylmwdoxmausx   Last Reviewed Date   2021-10-05  Consumer Information Use and Disclaimer   This information is not specific medical advice and does not replace information you receive from your health care provider. This is only a brief summary of general information. It does NOT include all information about conditions, illnesses, injuries, tests, procedures, treatments, therapies, discharge instructions or life-style choices that may apply to you. You must talk with your health care provider for complete information about your health and treatment options. This information should not be used to decide whether or not to accept your health care providers advice, instructions or recommendations. Only your health care provider has the knowledge and training to provide advice that is right for you.  Copyright   Copyright © 2021 [employer] and its affiliates and/or  licensors. All rights reserved.

## 2022-01-26 ENCOUNTER — TELEPHONE (OUTPATIENT)
Dept: ORTHOPEDICS | Facility: CLINIC | Age: 42
End: 2022-01-26
Payer: COMMERCIAL

## 2022-01-26 ENCOUNTER — HOSPITAL ENCOUNTER (OUTPATIENT)
Dept: RADIOLOGY | Facility: HOSPITAL | Age: 42
Discharge: HOME | End: 2022-01-26
Attending: PHYSICIAN ASSISTANT
Payer: COMMERCIAL

## 2022-01-26 ENCOUNTER — OFFICE VISIT (OUTPATIENT)
Dept: ORTHOPEDICS | Facility: CLINIC | Age: 42
End: 2022-01-26
Payer: COMMERCIAL

## 2022-01-26 ENCOUNTER — PATIENT MESSAGE (OUTPATIENT)
Dept: ADMINISTRATIVE | Facility: HOSPITAL | Age: 42
End: 2022-01-26
Payer: COMMERCIAL

## 2022-01-26 VITALS
SYSTOLIC BLOOD PRESSURE: 102 MMHG | DIASTOLIC BLOOD PRESSURE: 72 MMHG | HEIGHT: 64 IN | WEIGHT: 185 LBS | BODY MASS INDEX: 31.58 KG/M2 | HEART RATE: 88 BPM

## 2022-01-26 DIAGNOSIS — M79.672 LEFT FOOT PAIN: Primary | ICD-10-CM

## 2022-01-26 DIAGNOSIS — M79.672 LEFT FOOT PAIN: ICD-10-CM

## 2022-01-26 DIAGNOSIS — M76.822 POSTERIOR TIBIAL TENDINITIS, LEFT LEG: Primary | ICD-10-CM

## 2022-01-26 PROCEDURE — 3078F PR MOST RECENT DIASTOLIC BLOOD PRESSURE < 80 MM HG: ICD-10-PCS | Mod: CPTII,S$GLB,, | Performed by: PHYSICIAN ASSISTANT

## 2022-01-26 PROCEDURE — 1160F RVW MEDS BY RX/DR IN RCRD: CPT | Mod: CPTII,S$GLB,, | Performed by: PHYSICIAN ASSISTANT

## 2022-01-26 PROCEDURE — 99213 PR OFFICE/OUTPT VISIT, EST, LEVL III, 20-29 MIN: ICD-10-PCS | Mod: S$GLB,,, | Performed by: PHYSICIAN ASSISTANT

## 2022-01-26 PROCEDURE — 99999 PR PBB SHADOW E&M-EST. PATIENT-LVL III: ICD-10-PCS | Mod: PBBFAC,,, | Performed by: PHYSICIAN ASSISTANT

## 2022-01-26 PROCEDURE — 99213 OFFICE O/P EST LOW 20 MIN: CPT | Mod: S$GLB,,, | Performed by: PHYSICIAN ASSISTANT

## 2022-01-26 PROCEDURE — 99999 PR PBB SHADOW E&M-EST. PATIENT-LVL III: CPT | Mod: PBBFAC,,, | Performed by: PHYSICIAN ASSISTANT

## 2022-01-26 PROCEDURE — 1160F PR REVIEW ALL MEDS BY PRESCRIBER/CLIN PHARMACIST DOCUMENTED: ICD-10-PCS | Mod: CPTII,S$GLB,, | Performed by: PHYSICIAN ASSISTANT

## 2022-01-26 PROCEDURE — 3074F PR MOST RECENT SYSTOLIC BLOOD PRESSURE < 130 MM HG: ICD-10-PCS | Mod: CPTII,S$GLB,, | Performed by: PHYSICIAN ASSISTANT

## 2022-01-26 PROCEDURE — 1159F PR MEDICATION LIST DOCUMENTED IN MEDICAL RECORD: ICD-10-PCS | Mod: CPTII,S$GLB,, | Performed by: PHYSICIAN ASSISTANT

## 2022-01-26 PROCEDURE — 73610 XR ANKLE COMPLETE 3 VIEW LEFT: ICD-10-PCS | Mod: 26,LT,, | Performed by: RADIOLOGY

## 2022-01-26 PROCEDURE — 3008F BODY MASS INDEX DOCD: CPT | Mod: CPTII,S$GLB,, | Performed by: PHYSICIAN ASSISTANT

## 2022-01-26 PROCEDURE — 3008F PR BODY MASS INDEX (BMI) DOCUMENTED: ICD-10-PCS | Mod: CPTII,S$GLB,, | Performed by: PHYSICIAN ASSISTANT

## 2022-01-26 PROCEDURE — 73630 XR FOOT COMPLETE 3 VIEW LEFT: ICD-10-PCS | Mod: 26,LT,, | Performed by: RADIOLOGY

## 2022-01-26 PROCEDURE — 73610 X-RAY EXAM OF ANKLE: CPT | Mod: 26,LT,, | Performed by: RADIOLOGY

## 2022-01-26 PROCEDURE — 3078F DIAST BP <80 MM HG: CPT | Mod: CPTII,S$GLB,, | Performed by: PHYSICIAN ASSISTANT

## 2022-01-26 PROCEDURE — 73630 X-RAY EXAM OF FOOT: CPT | Mod: 26,LT,, | Performed by: RADIOLOGY

## 2022-01-26 PROCEDURE — 3074F SYST BP LT 130 MM HG: CPT | Mod: CPTII,S$GLB,, | Performed by: PHYSICIAN ASSISTANT

## 2022-01-26 PROCEDURE — 1159F MED LIST DOCD IN RCRD: CPT | Mod: CPTII,S$GLB,, | Performed by: PHYSICIAN ASSISTANT

## 2022-01-26 PROCEDURE — 73610 X-RAY EXAM OF ANKLE: CPT | Mod: TC,FY,LT

## 2022-01-26 PROCEDURE — 73630 X-RAY EXAM OF FOOT: CPT | Mod: TC,FY,LT

## 2022-01-26 NOTE — TELEPHONE ENCOUNTER
----- Message from Linda Scott sent at 1/26/2022  8:08 AM CST -----      of Venkatesh Pritchett MRN [id number] - can barely walk, leg pain, hard to get further information from him they want to be seen today by Tami does she have room on her schedule today?    Please reach out to patient  @# [phone]

## 2022-01-26 NOTE — PROGRESS NOTES
"  Subjective:      Patient ID: Venkatesh Pritchett is a 41 y.o. female.    Chief Complaint: Pain of the Left Ankle and Pain of the Left Foot      40yo F presents with a couple day history of left ankle pain. Denies injury. Woke up with pain medial ankle. Worse with weightbearing. Unable to walk now. Using wheelchair.  Notes instability and swelling in the ankle also. Denies paresthesias and weakness. Not currently taking anything for pain. Has no previous history of ankle or foot issues.       Review of Systems   Constitutional: Negative for chills and fever.   Cardiovascular: Negative for chest pain.   Respiratory: Negative for cough.    Hematologic/Lymphatic: Does not bruise/bleed easily.   Skin: Negative for poor wound healing and rash.   Musculoskeletal: Positive for joint pain, joint swelling, myalgias and stiffness. Negative for muscle weakness.   Gastrointestinal: Negative for abdominal pain.   Genitourinary: Negative for bladder incontinence.   Neurological: Negative for dizziness, loss of balance, paresthesias and weakness.   Psychiatric/Behavioral: Negative for altered mental status.       Review of patient's allergies indicates:  No Known Allergies     No current outpatient medications on file.     No current facility-administered medications for this visit.        The patient's relevant past medical, surgical, and social history was reviewed in Epic.       Objective:      VITAL SIGNS: /72 (BP Location: Left arm, Patient Position: Sitting, BP Method: Large (Automatic))   Pulse 88   Ht 5' 4" (1.626 m)   Wt 83.9 kg (185 lb)   LMP 12/27/2021   BMI 31.76 kg/m²     General    Nursing note and vitals reviewed.  Constitutional: She is oriented to person, place, and time. She appears well-developed and well-nourished.   Neurological: She is alert and oriented to person, place, and time.     General Musculoskeletal Exam   Gait: antalgic     Left Ankle/Foot Exam     Inspection  Effusion: Ankle - present "     Pain   The patient exhibits pain of the posterior tibial tendon.    Swelling   The patient is swollen on the posterior tibial tendon.    Range of Motion   Ankle Joint  Dorsiflexion: abnormal   Plantar flexion: abnormal     Subtalar Joint   Inversion: abnormal   Eversion: abnormal     Tests   Anterior drawer: negative  Varus tilt: negative  Heel Walk: unable to perform  Single Heel Rise: unable to perform    Other   Sensation: normal      Muscle Strength   Left Lower Extremity   Anterior tibial:  4/5   Posterior tibial:  4/5   Gastrocsoleus:  4/5   Peroneal muscle:  4/5     Vascular Exam       Left Pulses  Dorsalis Pedis:      2+  Posterior Tibial:      1+           X-Ray Ankle Complete Left  Narrative: EXAMINATION:  XR ANKLE COMPLETE 3 VIEW LEFT    CLINICAL HISTORY:  Pain in left foot    TECHNIQUE:  3 views of the left ankle.    Of note, there is no true AP and or mortise views of the ankle, partially limiting evaluation.    COMPARISON:  None    FINDINGS:  No acute displaced fracture, dislocation or suspicious osseous lesion. Anatomic alignment is maintained.    Regional soft tissues are grossly unremarkable.  Impression: 1. No acute displaced fracture, dislocation or suspicious osseous lesion.    Electronically signed by: Shayne Freeman  Date:    01/26/2022  Time:    11:26  X-Ray Foot Complete Left  Narrative: EXAMINATION:  XR FOOT COMPLETE 3 VIEW LEFT    CLINICAL HISTORY:  .  Pain in left foot    TECHNIQUE:  3 views of the left foot    COMPARISON:  None    FINDINGS:  No acute displaced fracture, dislocation or suspicious osseous lesion.    Mild hallux valgus and metatarsus primus varus deformities associated with degenerative changes at the 1st MTP joint.    Regional soft tissues are grossly unremarkable.  Impression: 1. No acute displaced fracture, dislocation or suspicious osseous lesion.  2. Hallux valgus and metatarsus primus varus deformities associated with degenerative changes at the 1st MTP  joint.    Electronically signed by: Shayne Freeman  Date:    01/26/2022  Time:    11:25      I have reviewed the above radiograph and agree with the findings stated by the radiologist.         Assessment:       1. Posterior tibial tendinitis, left leg          Plan:         Venkatesh was seen today for pain and pain.    Diagnoses and all orders for this visit:    Posterior tibial tendinitis, left leg  -     AIR CAST WALKER BOOT FOR HOME USE      Posterior tib tendinitis of the left ankle. Explained the natural history of the condition with the patient. I recommend getting her a tall cam walker boot at this time. WBAT. Activities  As tolerated. I want her to follow up in 4 weeks for repeat clinical assessment.         Poonam Linda PA-C   01/26/2022

## 2022-01-26 NOTE — TELEPHONE ENCOUNTER
Spoke with pt. MIGUEL Samano PA-C available appointment is 1/27/22 @ 7:00 am. Pt states that she will keep the appointment she have in [facility]. Patient states verbal understanding and has no further questions.

## 2022-03-02 ENCOUNTER — PATIENT OUTREACH (OUTPATIENT)
Dept: ADMINISTRATIVE | Facility: OTHER | Age: 42
End: 2022-03-02
Payer: COMMERCIAL

## 2022-03-02 NOTE — PROGRESS NOTES
Health Maintenance Due   Topic Date Due    Hepatitis C Screening  Never done    Cervical Cancer Screening  05/11/2018    Influenza Vaccine (1) Never done    COVID-19 Vaccine (3 - Booster for Pfizer series) 09/08/2021    Mammogram  04/27/2022     Updates were requested from care everywhere.  Chart was reviewed for overdue Proactive [facility] Encounters ([facility]) topics (CRS, Breast Cancer Screening, Eye exam)  Health Maintenance has been updated.  LINKS immunization registry triggered.  Immunizations were reconciled.

## 2022-04-07 ENCOUNTER — OFFICE VISIT (OUTPATIENT)
Dept: FAMILY MEDICINE | Facility: CLINIC | Age: 42
End: 2022-04-07
Payer: COMMERCIAL

## 2022-04-07 VITALS
HEART RATE: 86 BPM | WEIGHT: 188.69 LBS | OXYGEN SATURATION: 99 % | HEIGHT: 64 IN | DIASTOLIC BLOOD PRESSURE: 84 MMHG | BODY MASS INDEX: 32.21 KG/M2 | SYSTOLIC BLOOD PRESSURE: 132 MMHG

## 2022-04-07 DIAGNOSIS — E66.9 OBESITY (BMI 30.0-34.9): ICD-10-CM

## 2022-04-07 DIAGNOSIS — G89.29 CHRONIC RIGHT-SIDED LOW BACK PAIN WITH SCIATICA, SCIATICA LATERALITY UNSPECIFIED: ICD-10-CM

## 2022-04-07 DIAGNOSIS — K58.9 IRRITABLE BOWEL SYNDROME, UNSPECIFIED TYPE: ICD-10-CM

## 2022-04-07 DIAGNOSIS — M54.40 CHRONIC RIGHT-SIDED LOW BACK PAIN WITH SCIATICA, SCIATICA LATERALITY UNSPECIFIED: ICD-10-CM

## 2022-04-07 DIAGNOSIS — M79.89 LEG SWELLING: Primary | ICD-10-CM

## 2022-04-07 PROCEDURE — 99214 OFFICE O/P EST MOD 30 MIN: CPT | Mod: S$GLB,,, | Performed by: FAMILY MEDICINE

## 2022-04-07 PROCEDURE — 99999 PR PBB SHADOW E&M-EST. PATIENT-LVL III: CPT | Mod: PBBFAC,,, | Performed by: FAMILY MEDICINE

## 2022-04-07 PROCEDURE — 3079F DIAST BP 80-89 MM HG: CPT | Mod: CPTII,S$GLB,, | Performed by: FAMILY MEDICINE

## 2022-04-07 PROCEDURE — 1159F MED LIST DOCD IN RCRD: CPT | Mod: CPTII,S$GLB,, | Performed by: FAMILY MEDICINE

## 2022-04-07 PROCEDURE — 3008F PR BODY MASS INDEX (BMI) DOCUMENTED: ICD-10-PCS | Mod: CPTII,S$GLB,, | Performed by: FAMILY MEDICINE

## 2022-04-07 PROCEDURE — 3008F BODY MASS INDEX DOCD: CPT | Mod: CPTII,S$GLB,, | Performed by: FAMILY MEDICINE

## 2022-04-07 PROCEDURE — 3075F SYST BP GE 130 - 139MM HG: CPT | Mod: CPTII,S$GLB,, | Performed by: FAMILY MEDICINE

## 2022-04-07 PROCEDURE — 1159F PR MEDICATION LIST DOCUMENTED IN MEDICAL RECORD: ICD-10-PCS | Mod: CPTII,S$GLB,, | Performed by: FAMILY MEDICINE

## 2022-04-07 PROCEDURE — 3079F PR MOST RECENT DIASTOLIC BLOOD PRESSURE 80-89 MM HG: ICD-10-PCS | Mod: CPTII,S$GLB,, | Performed by: FAMILY MEDICINE

## 2022-04-07 PROCEDURE — 3075F PR MOST RECENT SYSTOLIC BLOOD PRESS GE 130-139MM HG: ICD-10-PCS | Mod: CPTII,S$GLB,, | Performed by: FAMILY MEDICINE

## 2022-04-07 PROCEDURE — 99214 PR OFFICE/OUTPT VISIT, EST, LEVL IV, 30-39 MIN: ICD-10-PCS | Mod: S$GLB,,, | Performed by: FAMILY MEDICINE

## 2022-04-07 PROCEDURE — 99999 PR PBB SHADOW E&M-EST. PATIENT-LVL III: ICD-10-PCS | Mod: PBBFAC,,, | Performed by: FAMILY MEDICINE

## 2022-04-07 RX ORDER — CYCLOBENZAPRINE HCL 5 MG
5 TABLET ORAL NIGHTLY
Qty: 20 TABLET | Refills: 0 | Status: SHIPPED | OUTPATIENT
Start: 2022-04-07 | End: 2022-04-17

## 2022-04-07 NOTE — PROGRESS NOTES
(Portions of this note were dictated using voice recognition software and may contain dictation related errors in spelling/grammar/syntax not found on text review)    CC:   Chief Complaint   Patient presents with    Leg Pain       HPI: 41 y.o. female presented with leg pain and swelling.  Patient has a medical history significant for IBS, back pain.  She reports that she has bilateral leg swelling for the past year,more on the right side, leg swelling has not worsened and is the same, this is intermittent problem, she also has right leg pain, she reports that the pain starts in the back on the right side for the past few months and feels like muscle pain and tightness, it radiates towards the buttocks and right leg, it feels more like a dullness and achiness in the leg, there is no numbness and tingling.  Patient has abdominal pain going on for some time, she has extensive workup including CT scan, ultrasound and GI follow-up, she was also checked for celiac disease, pancreatitis and H pylori and workup was negative for any acute abnormality she takes hyoscyamine but does not think it helps with her symptoms.  She endorses eating large fatty meals with lots of carbohydrates, reports having flatulence and gas.  Her bowel movements are regular.  She denies having fever, chills, cough, shortness of breath, chest pain, nausea, changes in bowel habits, urine problems including burning and dysuria.    Past Medical History:   Diagnosis Date    IBS (irritable bowel syndrome)        Past Surgical History:   Procedure Laterality Date    DILATION AND CURETTAGE OF UTERUS  2000    [facility]AB       Family History   Problem Relation Age of Onset    No Known Problems Father     No Known Problems Mother     Hypertension Neg Hx     Colon cancer Neg Hx     Amblyopia Neg Hx     Blindness Neg Hx     Cataracts Neg Hx     Glaucoma Neg Hx     Macular degeneration Neg Hx     Retinal detachment Neg Hx     Strabismus Neg Hx     Breast  cancer Neg Hx     Ovarian cancer Neg Hx     Celiac disease Neg Hx     Esophageal cancer Neg Hx     Rectal cancer Neg Hx     Stomach cancer Neg Hx     Liver disease Neg Hx     Liver cancer Neg Hx        Social History     Tobacco Use    Smoking status: Never Smoker    Smokeless tobacco: Never Used   Substance Use Topics    Alcohol use: No    Drug use: No       Lab Results   Component Value Date    WBC 9.45 12/29/2021    HGB 13.0 12/29/2021    HCT 41.8 12/29/2021    MCV 87 12/29/2021     12/29/2021    CHOL 178 12/29/2021    TRIG 95 12/29/2021    HDL 64 12/29/2021    ALT 13 12/29/2021    AST 11 12/29/2021    BILITOT 1.5 (H) 12/29/2021    ALKPHOS 96 12/29/2021     12/29/2021    K 4.3 12/29/2021     12/29/2021    CREATININE 0.8 12/29/2021    ESTGFRAFRICA >60.0 12/29/2021    EGFRNONAA >60.0 12/29/2021    CALCIUM 9.9 12/29/2021    ALBUMIN 3.8 12/29/2021    BUN 7 12/29/2021    CO2 24 12/29/2021    TSH 1.242 12/29/2021    HGBA1C 5.3 12/29/2021    LDLCALC 95.0 12/29/2021    GLU 87 12/29/2021    [id number] 13 (L) 03/04/2020             Vital signs reviewed  PE:   APPEARANCE: Well nourished, well developed, in no acute distress.    HEAD: Normocephalic, atraumatic.  EYES: EOMI.  Conjunctivae noninjected.  NOSE: Mucosa pink. Airway clear.  MOUTH & THROAT: No tonsillar enlargement. No pharyngeal erythema or exudate.   NECK: Supple with no cervical lymphadenopathy.    CHEST: Good inspiratory effort. Lungs clear to auscultation with no wheezes or crackles.  CARDIOVASCULAR: Normal S1, S2. No rubs, murmurs, or gallops.  ABDOMEN: Bowel sounds normal. Not distended. Soft. tenderness on right side. No organomegaly.  EXTREMITIES: non pitting edema bilaterally, cyanosis, or clubbing.  BACK: No skin changes, no tenderness on palpation, ROM is normal      Review of Systems   Constitutional: Negative for chills, fatigue and fever.   HENT: Negative.    Respiratory: Negative for cough, shortness of breath and  wheezing.    Cardiovascular: Positive for leg swelling. Negative for chest pain and palpitations.   Gastrointestinal: Positive for abdominal pain and nausea. Negative for change in bowel habit, constipation, diarrhea, vomiting and change in bowel habit.   Genitourinary: Negative.    Musculoskeletal: Negative.    Neurological: Negative.    Psychiatric/Behavioral: Negative.    All other systems reviewed and are negative.      IMPRESSION  1. Leg swelling    2. Irritable bowel syndrome, unspecified type    3. Obesity (BMI 30.0-34.9)    4.      Chronic back pain        PLAN    1. Irritable bowel syndrome, unspecified type  Chronic  She had work up for abdominal pain in the past and also followed up with GI, CT abdomen and ultrasound from last year reviewed- no acute abnormality visible  - continue taking hyoscyamine prescribed to her by GI  -advised patient to consider dietary modification, to cut back on carbohydrates and need heavy meals, drink plenty of water, include more salads and fruits in diet, make sure to have regular bowel movements  - Follow up with GI for worsening symptoms      2. Leg swelling  Chronic problem  Advised to wear compression stockings   Elevate the legs   Restrict salt intake  Monitor for any progression of symptoms, if develops redness, pain, hot and tender, needs to notify PCP ASAP        3. Obesity (BMI 30.0-34.9)  BMI 32  Counseling provided on healthy lifestyle, encouraged to do moderate intensity regular exercise 30 minutes every day 5 days a week, to include vegetables and fruits and cut back on saturated fats and carbohydrates.        4. Chronic right-sided low back pain with sciatica, sciatica laterality unspecified  - cyclobenzaprine (FLEXERIL) 5 MG tablet; Take 1 tablet (5 mg total) by mouth nightly. for 10 days  Dispense: 20 tablet; Refill: 0  Recommended to have back stretching exercises at home, will consider physical therapy referral if symptoms continue to  Continue taking  NSAIDs as needed        SCREENINGS      Immunizations:   UTD with Covid vaccine, denies flu vaccine      Age/demographic appropriate health maintenance:  Done        Chintan Gillespie   4/7/2022

## 2022-05-06 ENCOUNTER — TELEPHONE (OUTPATIENT)
Dept: ENDOSCOPY | Facility: HOSPITAL | Age: 42
End: 2022-05-06
Payer: COMMERCIAL

## 2022-05-06 ENCOUNTER — PATIENT MESSAGE (OUTPATIENT)
Dept: ENDOSCOPY | Facility: HOSPITAL | Age: 42
End: 2022-05-06
Payer: COMMERCIAL

## 2022-05-11 ENCOUNTER — PATIENT OUTREACH (OUTPATIENT)
Dept: ADMINISTRATIVE | Facility: OTHER | Age: 42
End: 2022-05-11
Payer: COMMERCIAL

## 2022-05-11 NOTE — PROGRESS NOTES
Health Maintenance Due   Topic Date Due    Hepatitis C Screening  Never done    Cervical Cancer Screening  05/11/2018    COVID-19 Vaccine (3 - Booster for Pfizer series) 09/08/2021    Mammogram  04/27/2022     Updates were requested from care everywhere.  DIS/Chart was reviewed for overdue Proactive [facility] Encounters ([facility]) topics (CRS, Breast Cancer Screening, Eye exam)  Health Maintenance has been updated.  LINKS immunization registry triggered.  Immunizations were reconciled.

## 2022-05-12 ENCOUNTER — OFFICE VISIT (OUTPATIENT)
Dept: GASTROENTEROLOGY | Facility: CLINIC | Age: 42
End: 2022-05-12
Payer: COMMERCIAL

## 2022-05-12 VITALS
DIASTOLIC BLOOD PRESSURE: 78 MMHG | SYSTOLIC BLOOD PRESSURE: 102 MMHG | BODY MASS INDEX: 32.67 KG/M2 | HEIGHT: 64 IN | HEART RATE: 79 BPM | WEIGHT: 191.38 LBS

## 2022-05-12 DIAGNOSIS — R10.9 ABDOMINAL PAIN, UNSPECIFIED ABDOMINAL LOCATION: Primary | ICD-10-CM

## 2022-05-12 PROCEDURE — 99214 PR OFFICE/OUTPT VISIT, EST, LEVL IV, 30-39 MIN: ICD-10-PCS | Mod: S$GLB,,, | Performed by: INTERNAL MEDICINE

## 2022-05-12 PROCEDURE — 99999 PR PBB SHADOW E&M-EST. PATIENT-LVL III: ICD-10-PCS | Mod: PBBFAC,,, | Performed by: INTERNAL MEDICINE

## 2022-05-12 PROCEDURE — 3008F PR BODY MASS INDEX (BMI) DOCUMENTED: ICD-10-PCS | Mod: CPTII,S$GLB,, | Performed by: INTERNAL MEDICINE

## 2022-05-12 PROCEDURE — 3074F PR MOST RECENT SYSTOLIC BLOOD PRESSURE < 130 MM HG: ICD-10-PCS | Mod: CPTII,S$GLB,, | Performed by: INTERNAL MEDICINE

## 2022-05-12 PROCEDURE — 3078F PR MOST RECENT DIASTOLIC BLOOD PRESSURE < 80 MM HG: ICD-10-PCS | Mod: CPTII,S$GLB,, | Performed by: INTERNAL MEDICINE

## 2022-05-12 PROCEDURE — 3074F SYST BP LT 130 MM HG: CPT | Mod: CPTII,S$GLB,, | Performed by: INTERNAL MEDICINE

## 2022-05-12 PROCEDURE — 1159F MED LIST DOCD IN RCRD: CPT | Mod: CPTII,S$GLB,, | Performed by: INTERNAL MEDICINE

## 2022-05-12 PROCEDURE — 3008F BODY MASS INDEX DOCD: CPT | Mod: CPTII,S$GLB,, | Performed by: INTERNAL MEDICINE

## 2022-05-12 PROCEDURE — 99999 PR PBB SHADOW E&M-EST. PATIENT-LVL III: CPT | Mod: PBBFAC,,, | Performed by: INTERNAL MEDICINE

## 2022-05-12 PROCEDURE — 3078F DIAST BP <80 MM HG: CPT | Mod: CPTII,S$GLB,, | Performed by: INTERNAL MEDICINE

## 2022-05-12 PROCEDURE — 99214 OFFICE O/P EST MOD 30 MIN: CPT | Mod: S$GLB,,, | Performed by: INTERNAL MEDICINE

## 2022-05-12 PROCEDURE — 1159F PR MEDICATION LIST DOCUMENTED IN MEDICAL RECORD: ICD-10-PCS | Mod: CPTII,S$GLB,, | Performed by: INTERNAL MEDICINE

## 2022-05-12 RX ORDER — OMEPRAZOLE 40 MG/1
40 CAPSULE, DELAYED RELEASE ORAL DAILY
Qty: 30 CAPSULE | Refills: 11 | Status: SHIPPED | OUTPATIENT
Start: 2022-05-12 | End: 2023-05-12

## 2022-05-12 NOTE — PROGRESS NOTES
[facility] Consultation     Reason for Consult:  The encounter diagnosis was Abdominal pain, unspecified abdominal location.    PCP:   Darlyn Carrion       Referring MD:  No referring provider defined for this encounter.    HPI:  This is a 41 y.o. female with no significant past medical history who presents to GI clinic.  She describes right-sided abdominal discomfort with occasional radiation to her back and leg. Initially said the pain has been present for months and then also mentioned it has been years.  She says she has longstanding IBS and believes it may be related to that.  She denies any association with food. Sometimes worse with deep palpation. Unclear what relieves it.  She has formed, brown bowel movements daily. Denies any blood in the stool.  Denies fevers, nausea, vomiting, unintentional weight loss.  No prior EGD or colonoscopy.    She underwent CT abdomen 2021 which showed hepatomegaly, hiatal hernia and small umbilical hernia  US abdomen without acute pathology.      ROS:  Constitutional: No fevers, chills, No weight loss  ENT: No allergies  CV: No chest pain  Pulm: No cough, No shortness of breath  Ophtho: No vision changes  GI: see HPI  Derm: No rash  Heme: No lymphadenopathy, No bruising  MSK: No arthritis  : No dysuria, No hematuria  Endo: No hot or cold intolerance  Neuro: No syncope, No seizure  Psych: No anxiety, No depression    Medical History:  has a past medical history of IBS (irritable bowel syndrome).    Surgical History:  has a past surgical history that includes Dilation and curettage of uterus (2000).    Family History: family history includes No Known Problems in her father and mother..   No contributory family history     Social History:  reports that she has never smoked. She has never used smokeless tobacco. She reports that she does not drink alcohol and does not use drugs.    Review of patient's allergies indicates:  No Known  "Allergies        Objective Findings:    Vital Signs:  /78   Pulse 79   Ht 5' 4" (1.626 m)   Wt 86.8 kg (191 lb 6.4 oz)   LMP 04/28/2022   BMI 32.85 kg/m²   Body mass index is 32.85 kg/m².    Physical Exam:  General Appearance: well-appearing, NAD  Head:   Normocephalic, without obvious abnormality  Eyes:    No scleral icterus, EOMI  ENT: Neck supple; moist mucus membranes  Lungs: clear to auscultation bilaterally.  Heart:  regular rate and rhythm, S1, S2 normal, no murmurs heard  Abdomen: soft, non-tender, non-distended with bowel sounds in all four quadrants. No hepatosplenomegaly, ascites, or palpable masses  Extremities: 2+ pulses, no clubbing, cyanosis or edema  Skin: No visible rash  Neurologic: no focal motor deficits      Labs:  Lab Results   Component Value Date    WBC 9.45 12/29/2021    HGB 13.0 12/29/2021    HCT 41.8 12/29/2021     12/29/2021    CHOL 178 12/29/2021    TRIG 95 12/29/2021    HDL 64 12/29/2021    ALT 13 12/29/2021    AST 11 12/29/2021     12/29/2021    K 4.3 12/29/2021     12/29/2021    CREATININE 0.8 12/29/2021    BUN 7 12/29/2021    CO2 24 12/29/2021    TSH 1.242 12/29/2021    HGBA1C 5.3 12/29/2021       IMAGING:    CT abdomen 2021 which showed hepatomegaly, hiatal hernia and small umbilical hernia  US abdomen without acute pathology.    Assessment/Plan:  This is a 41 y.o. female with no significant past medical history who presents to GI clinic.    Abdominal discomfort, right > left. This seems to be a chronic issue for the patient.  Given she reports some radiation to her leg / back - I do suspect either a functional or MSK component. CT abdomen and US abdomen without etiology of the pain  Will check hpylori stool antigen  Check celiac serologies  Trial omeprazole 40 mg daily before breakfast for 6 weeks  Take IBgard as needed  Will consider upper endoscopy in 6-8 weeks if no improvement        Order summary:  Orders Placed This Encounter    H. pylori " antigen, stool    Celiac Disease Panel    omeprazole (PRILOSEC) 40 MG capsule         Thank you so much for allowing me to participate in the care of Venkatesh Marinelli MD  Gastroenterology   [facility]

## 2022-05-16 ENCOUNTER — OFFICE VISIT (OUTPATIENT)
Dept: INTERNAL MEDICINE | Facility: CLINIC | Age: 42
End: 2022-05-16
Payer: COMMERCIAL

## 2022-05-16 VITALS
HEART RATE: 93 BPM | OXYGEN SATURATION: 99 % | HEIGHT: 64 IN | BODY MASS INDEX: 33.2 KG/M2 | DIASTOLIC BLOOD PRESSURE: 66 MMHG | SYSTOLIC BLOOD PRESSURE: 110 MMHG | WEIGHT: 194.44 LBS

## 2022-05-16 DIAGNOSIS — M79.609 PAIN IN EXTREMITY, UNSPECIFIED EXTREMITY: Primary | ICD-10-CM

## 2022-05-16 PROCEDURE — 1159F MED LIST DOCD IN RCRD: CPT | Mod: CPTII,S$GLB,, | Performed by: INTERNAL MEDICINE

## 2022-05-16 PROCEDURE — 3008F PR BODY MASS INDEX (BMI) DOCUMENTED: ICD-10-PCS | Mod: CPTII,S$GLB,, | Performed by: INTERNAL MEDICINE

## 2022-05-16 PROCEDURE — 1159F PR MEDICATION LIST DOCUMENTED IN MEDICAL RECORD: ICD-10-PCS | Mod: CPTII,S$GLB,, | Performed by: INTERNAL MEDICINE

## 2022-05-16 PROCEDURE — 3078F PR MOST RECENT DIASTOLIC BLOOD PRESSURE < 80 MM HG: ICD-10-PCS | Mod: CPTII,S$GLB,, | Performed by: INTERNAL MEDICINE

## 2022-05-16 PROCEDURE — 3008F BODY MASS INDEX DOCD: CPT | Mod: CPTII,S$GLB,, | Performed by: INTERNAL MEDICINE

## 2022-05-16 PROCEDURE — 99999 PR PBB SHADOW E&M-EST. PATIENT-LVL IV: ICD-10-PCS | Mod: PBBFAC,,, | Performed by: INTERNAL MEDICINE

## 2022-05-16 PROCEDURE — 99213 OFFICE O/P EST LOW 20 MIN: CPT | Mod: S$GLB,,, | Performed by: INTERNAL MEDICINE

## 2022-05-16 PROCEDURE — 99213 PR OFFICE/OUTPT VISIT, EST, LEVL III, 20-29 MIN: ICD-10-PCS | Mod: S$GLB,,, | Performed by: INTERNAL MEDICINE

## 2022-05-16 PROCEDURE — 99999 PR PBB SHADOW E&M-EST. PATIENT-LVL IV: CPT | Mod: PBBFAC,,, | Performed by: INTERNAL MEDICINE

## 2022-05-16 PROCEDURE — 3078F DIAST BP <80 MM HG: CPT | Mod: CPTII,S$GLB,, | Performed by: INTERNAL MEDICINE

## 2022-05-16 PROCEDURE — 3074F SYST BP LT 130 MM HG: CPT | Mod: CPTII,S$GLB,, | Performed by: INTERNAL MEDICINE

## 2022-05-16 PROCEDURE — 3074F PR MOST RECENT SYSTOLIC BLOOD PRESSURE < 130 MM HG: ICD-10-PCS | Mod: CPTII,S$GLB,, | Performed by: INTERNAL MEDICINE

## 2022-05-16 RX ORDER — SULFAMETHOXAZOLE AND TRIMETHOPRIM 400; 80 MG/1; MG/1
1 TABLET ORAL 2 TIMES DAILY
Qty: 14 TABLET | Refills: 0 | Status: SHIPPED | OUTPATIENT
Start: 2022-05-16

## 2022-05-16 RX ORDER — CELECOXIB 200 MG/1
CAPSULE ORAL
Qty: 20 CAPSULE | Refills: 0 | Status: SHIPPED | OUTPATIENT
Start: 2022-05-16 | End: 2024-02-12 | Stop reason: SDUPTHER

## 2022-05-16 NOTE — PROGRESS NOTES
Subjective:       Patient ID: Venkatesh Pritchett is a 41 y.o. female.    Chief Complaint: Leg Pain    HPIPt with R hip and R ankle pain.  Had foot injury a few months ago unclear if related.  Also gets boils on her skin - has one that is red and tender but no fluctuance yet.  No CP or SOB.  Review of Systems   Respiratory: Negative for shortness of breath (PND or orthopnea).    Cardiovascular: Negative for chest pain (arm pain or jaw pain).   Gastrointestinal: Negative for abdominal pain, diarrhea, nausea and vomiting.   Genitourinary: Negative for dysuria.   Neurological: Negative for seizures, syncope and headaches.       Objective:      Physical Exam  Constitutional:       General: She is not in acute distress.     Appearance: She is well-developed.   HENT:      Head: Normocephalic.   Neck:      Thyroid: No thyromegaly.      Vascular: No JVD.   Cardiovascular:      Rate and Rhythm: Normal rate and regular rhythm.      Heart sounds: Normal heart sounds. No murmur heard.    No friction rub. No gallop.   Pulmonary:      Effort: Pulmonary effort is normal.      Breath sounds: Normal breath sounds. No wheezing or rales.   Abdominal:      General: Bowel sounds are normal. There is no distension.      Palpations: Abdomen is soft. There is no mass.      Tenderness: There is no abdominal tenderness. There is no guarding or rebound.   Musculoskeletal:      Cervical back: Neck supple.      Comments: Some pain on rotation  of the R hip   Lymphadenopathy:      Cervical: No cervical adenopathy.   Skin:     General: Skin is warm and dry.   Neurological:      Mental Status: She is alert and oriented to person, place, and time.      Deep Tendon Reflexes: Reflexes are normal and symmetric.   Psychiatric:         Behavior: Behavior normal.         Thought Content: Thought content normal.         Judgment: Judgment normal.         Assessment:       1. Pain in extremity, unspecified extremity        Plan:   Pain in extremity,  unspecified extremity  -     X-Ray Hip 2 or 3 views Right (with Pelvis when performed); Future; Expected date: 05/16/2022  -     X-Ray Ankle Complete Right; Future; Expected date: 05/16/2022  -     Ambulatory referral/consult to Physical/Occupational Therapy; Future; Expected date: 05/23/2022    Other orders  -     celecoxib (CELEBREX) 200 MG capsule; One in the morning with breakfast  Dispense: 20 capsule; Refill: 0  -     sulfamethoxazole-trimethoprim 400-80mg (BACTRIM,SEPTRA) 400-80 mg per tablet; Take 1 tablet by mouth 2 (two) times daily.  Dispense: 14 tablet; Refill: 0    Bactrim for early abscess  Use hibiclens three times weekly  Was hit in the head by a soccer ball - reassured

## 2022-05-18 ENCOUNTER — PATIENT MESSAGE (OUTPATIENT)
Dept: GASTROENTEROLOGY | Facility: CLINIC | Age: 42
End: 2022-05-18
Payer: COMMERCIAL

## 2022-05-22 ENCOUNTER — PATIENT MESSAGE (OUTPATIENT)
Dept: INTERNAL MEDICINE | Facility: CLINIC | Age: 42
End: 2022-05-22
Payer: COMMERCIAL

## 2022-05-30 ENCOUNTER — PATIENT MESSAGE (OUTPATIENT)
Dept: ADMINISTRATIVE | Facility: HOSPITAL | Age: 42
End: 2022-05-30
Payer: COMMERCIAL

## 2022-06-03 ENCOUNTER — LAB VISIT (OUTPATIENT)
Dept: LAB | Facility: HOSPITAL | Age: 42
End: 2022-06-03
Attending: INTERNAL MEDICINE
Payer: COMMERCIAL

## 2022-06-03 DIAGNOSIS — R10.9 ABDOMINAL PAIN, UNSPECIFIED ABDOMINAL LOCATION: ICD-10-CM

## 2022-06-03 PROCEDURE — 87338 HPYLORI STOOL AG IA: CPT | Performed by: INTERNAL MEDICINE

## 2022-06-08 LAB
H PYLORI AG STL QL IA: NOT DETECTED
SPECIMEN SOURCE: NORMAL

## 2022-07-11 ENCOUNTER — PATIENT MESSAGE (OUTPATIENT)
Dept: ADMINISTRATIVE | Facility: HOSPITAL | Age: 42
End: 2022-07-11
Payer: COMMERCIAL

## 2022-10-10 ENCOUNTER — PATIENT MESSAGE (OUTPATIENT)
Dept: ADMINISTRATIVE | Facility: HOSPITAL | Age: 42
End: 2022-10-10
Payer: COMMERCIAL

## 2022-11-11 NOTE — PROGRESS NOTES
"Subjective:       Patient ID: Venkatesh Pritchett is a 41 y.o. female.    Vitals:  height is 5' 4" (1.626 m) and weight is 83.9 kg (185 lb). Her oral temperature is 98.5 °F (36.9 °C). Her blood pressure is 116/80 and her pulse is 90. Her respiration is 16 and oxygen saturation is 98%.     Chief Complaint: Fever    Vaccinated for COVID but not flu  C/o sore throat for few days, no fever or chills    Exposed to SON with covid positive        Fever   This is a new problem. The current episode started in the past 7 days (2 days ago). The problem occurs constantly. The problem has been waxing and waning. Associated symptoms include a sore throat. She has tried acetaminophen for the symptoms. The treatment provided mild relief.       Constitution: Positive for fever.   HENT: Positive for sore throat.        Objective:      Physical Exam   Constitutional: She is oriented to person, place, and time. She appears well-developed and well-nourished. She is cooperative.  Non-toxic appearance. She does not appear ill. No distress.   HENT:   Head: Normocephalic and atraumatic.   Ears:   Right Ear: Hearing, tympanic membrane, external ear and ear canal normal.   Left Ear: Hearing, tympanic membrane, external ear and ear canal normal.   Nose: Nose normal. No mucosal edema, rhinorrhea or nasal deformity. No epistaxis. Right sinus exhibits no maxillary sinus tenderness and no frontal sinus tenderness. Left sinus exhibits no maxillary sinus tenderness and no frontal sinus tenderness.   Mouth/Throat: Uvula is midline, oropharynx is clear and moist and mucous membranes are normal. Mucous membranes are moist. No trismus in the jaw. Normal dentition. No uvula swelling. No posterior oropharyngeal erythema. Oropharynx is clear.   Eyes: Conjunctivae and lids are normal. Pupils are equal, round, and reactive to light. Right eye exhibits no discharge. Left eye exhibits no discharge. No scleral icterus.      extraocular movement intact   Neck: " Trachea normal and phonation normal. Neck supple.   Cardiovascular: Normal rate, regular rhythm, normal heart sounds, intact distal pulses and normal pulses.   Pulmonary/Chest: Effort normal and breath sounds normal. No respiratory distress.   Abdominal: Normal appearance and bowel sounds are normal. She exhibits no distension, no pulsatile midline mass and no mass. Soft. There is no abdominal tenderness.   Musculoskeletal: Normal range of motion.         General: No deformity or edema. Normal range of motion.   Neurological: She is alert and oriented to person, place, and time. She exhibits normal muscle tone. Coordination normal.   Skin: Skin is warm, dry, intact, not diaphoretic and not pale.   Psychiatric: She has a normal mood and affect. Her speech is normal and behavior is normal. Judgment and thought content normal. Cognition and memory  Nursing note and vitals reviewed.        Assessment:       1. Sore throat          Plan:         Sore throat  -     POCT COVID-19 Rapid Screening                Results for orders placed or performed in visit on 01/11/22   POCT COVID-19 Rapid Screening   Result Value Ref Range    POC Rapid COVID Negative Negative     Acceptable Yes              Finasteride Pregnancy And Lactation Text: This medication is absolutely contraindicated during pregnancy. It is unknown if it is excreted in breast milk.

## 2023-04-03 ENCOUNTER — PATIENT MESSAGE (OUTPATIENT)
Dept: ADMINISTRATIVE | Facility: HOSPITAL | Age: 43
End: 2023-04-03
Payer: COMMERCIAL

## 2023-05-22 NOTE — PATIENT INSTRUCTIONS
Call if symptoms worsen or fail to improve.  Try the dietary modifications discussed below.    What Is a Hiatal Hernia?    Hiatal hernia is when the area where the stomach and esophagus meet bulges up through the diaphragm into the chest cavity. In some cases, part of the stomach may bulge above the diaphragm. Stomach acid may move up into the esophagus and cause symptoms. The symptoms are often blamed on gastroesophageal reflux disease (GERD). You may only know about the hernia when it shows up on an X-ray taken for other reasons.   What you may feel  The hiatus is a normal hole in the diaphragm. The esophagus passes through this hole and leads to the stomach. In some cases, part of the stomach may bulge above the diaphragm. This bulge is called a hernia. Stomach acid may move up into the esophagus and cause symptoms.  When you eat, the muscle at the hiatus relaxes to allow food to pass into the stomach. It tightens again to keep food and digestive acids in the stomach.  Many people with hiatal hernias have mild symptoms. You may notice the following GERD symptoms:  · Heartburn or other chest discomfort  · A feeling of chest fullness after a meal  · Frequent burping  · Acid taste in the mouth  · Trouble swallowing  Treating symptoms  If you have been diagnosed with hiatal hernia, these suggestions may help improve symptoms:  · Lose excess weight. Extra weight puts pressure on the stomach and esophagus.  · Dont lie down after eating. Sit up for at least an hour after eating. Lying down after eating can increase symptoms.  · Avoid certain foods and drinks. These include fatty foods, chocolate, coffee, mint, and other foods that cause symptoms for you.  · Dont smoke or drink alcohol. These can worsen symptoms.  · Look at your medicines. Discuss your medicines with your healthcare provider. Many medicines can cause symptoms.  · Consider an antacid medicine. Ask your healthcare provider about over-the-counter  and prescription medicines that may help.  · Ask about surgery, if needed. Surgery is a treatment choice for some people. Your healthcare provider can determine if surgery is an option for you.    Date Last Reviewed: 10/1/2016  © 8422-1341 [employer]. [address] [place] [address], [place] [address]. All rights reserved. This information is not intended as a substitute for professional medical care. Always follow your healthcare professional's instructions.        Lifestyle Changes for Controlling GERD  When you have GERD, stomach acid feels as if its backing up toward your mouth. Whether or not you take medicine to control your GERD, your symptoms can often be improved with lifestyle changes. Talk to your healthcare provider about the following suggestions. These suggestions may help you get relief from your symptoms.      Raise your head  Reflux is more likely to strike when youre lying down flat, because stomach fluid can flow backward more easily. Raising the head of your bed 4 to 6 inches can help. To do this:  · Slide blocks or books under the legs at the head of your bed. Or, place a wedge under the mattress. Many dVisit can make a suitable wedge for you. The wedge should run from your waist to the top of your head.  · Dont just prop your head on several pillows. This increases pressure on your stomach. It can make GERD worse.  Watch your eating habits  Certain foods may increase the acid in your stomach or relax the lower esophageal sphincter. This makes GERD more likely. Its best to avoid the following if they cause you symptoms:  · Coffee, tea, and carbonated drinks (with and without caffeine)  · Fatty, fried, or spicy food  · Mint, chocolate, onions, and tomatoes  · Peppermint  · Any other foods that seem to irritate your stomach or cause you pain  Relieve the pressure  Tips include the following:  · Eat smaller meals, even if you have to eat more often.  · Dont lie down right after you  eat. Wait a few hours for your stomach to empty.  · Avoid tight belts and tight-fitting clothes.  · Lose excess weight.  Tobacco and alcohol  Avoid smoking tobacco and drinking alcohol. They can make GERD symptoms worse.  Date Last Reviewed: 7/1/2016  © 0216-0438 [employer]. [address] [place] [address], [place] [address]. All rights reserved. This information is not intended as a substitute for professional medical care. Always follow your healthcare professional's instructions.         Detail Level: Detailed Detail Level: Simple Detail Level: Zone

## 2024-01-30 ENCOUNTER — PATIENT OUTREACH (OUTPATIENT)
Dept: ADMINISTRATIVE | Facility: HOSPITAL | Age: 44
End: 2024-01-30
Payer: COMMERCIAL

## 2024-02-07 ENCOUNTER — OFFICE VISIT (OUTPATIENT)
Dept: URGENT CARE | Facility: CLINIC | Age: 44
End: 2024-02-07
Payer: COMMERCIAL

## 2024-02-07 VITALS
BODY MASS INDEX: 33.12 KG/M2 | HEART RATE: 66 BPM | TEMPERATURE: 97 F | HEIGHT: 64 IN | RESPIRATION RATE: 18 BRPM | OXYGEN SATURATION: 98 % | WEIGHT: 194 LBS | DIASTOLIC BLOOD PRESSURE: 86 MMHG | SYSTOLIC BLOOD PRESSURE: 104 MMHG

## 2024-02-07 DIAGNOSIS — J02.9 VIRAL PHARYNGITIS: Primary | ICD-10-CM

## 2024-02-07 DIAGNOSIS — H65.199 ACUTE MIDDLE EAR EFFUSION, UNSPECIFIED LATERALITY: ICD-10-CM

## 2024-02-07 DIAGNOSIS — J02.9 SORE THROAT: ICD-10-CM

## 2024-02-07 LAB
CTP QC/QA: YES
CTP QC/QA: YES
MOLECULAR STREP A: NEGATIVE
POC MOLECULAR INFLUENZA A AGN: NEGATIVE
POC MOLECULAR INFLUENZA B AGN: NEGATIVE

## 2024-02-07 PROCEDURE — 87070 CULTURE OTHR SPECIMN AEROBIC: CPT

## 2024-02-07 PROCEDURE — 87651 STREP A DNA AMP PROBE: CPT | Mod: QW,S$GLB,,

## 2024-02-07 PROCEDURE — 87502 INFLUENZA DNA AMP PROBE: CPT | Mod: QW,S$GLB,,

## 2024-02-07 PROCEDURE — 99214 OFFICE O/P EST MOD 30 MIN: CPT | Mod: S$GLB,,,

## 2024-02-07 RX ORDER — FLUTICASONE PROPIONATE 50 MCG
1 SPRAY, SUSPENSION (ML) NASAL 2 TIMES DAILY
Qty: 16 G | Refills: 0 | Status: SHIPPED | OUTPATIENT
Start: 2024-02-07 | End: 2024-02-12

## 2024-02-07 RX ORDER — PREDNISONE 20 MG/1
20 TABLET ORAL 2 TIMES DAILY
Qty: 10 TABLET | Refills: 0 | Status: SHIPPED | OUTPATIENT
Start: 2024-02-07 | End: 2024-02-12

## 2024-02-08 NOTE — PROGRESS NOTES
Subjective:      Patient ID: Venkatesh Pritchett is a 43 y.o. female.    Chief Complaint: No chief complaint on file.    HPI  ROS  Objective:     Physical Exam   Assessment:      No diagnosis found.  Plan:                 No follow-ups on file.

## 2024-02-08 NOTE — PROGRESS NOTES
"Subjective:      Patient ID: Venkatesh Pritchett is a 43 y.o. female.    Vitals:  height is 5' 4" (1.626 m) and weight is 88 kg (194 lb). Her oral temperature is 97 °F (36.1 °C). Her blood pressure is 104/86 and her pulse is 66. Her respiration is 18 and oxygen saturation is 98%.     Chief Complaint: Sore Throat    44 y/o female presents to clinic with chief complaint of sore throat, subclinical fever, ear pressure bilaterally, pain with swallowing, body aches, chills. Symptoms started one day ago. Pain is 8/10. Patient has taken tylenol with no relief. Denies any recent ill exposures.  Denies any recent travel.  Denies history of seasonal allergies. Denies numbness or tingling. Denies radiation of pain. Denies  chest pain, shortness of breath, abdominal pain, nausea, vomiting, diarrhea, or rashes.            Sore Throat   This is a new problem. The current episode started yesterday. There has been no fever. Associated symptoms include ear pain, swollen glands and trouble swallowing. Pertinent negatives include no abdominal pain, congestion, coughing, diarrhea, ear discharge, headaches, neck pain, shortness of breath or vomiting. She has tried acetaminophen for the symptoms.       Constitution: Positive for chills and fever. Negative for activity change, appetite change, sweating, fatigue, generalized weakness and international travel in last 60 days.   HENT:  Positive for ear pain, sore throat and trouble swallowing. Negative for ear discharge, tinnitus, hearing loss, congestion, nosebleeds, foreign body in nose, postnasal drip, sinus pain, sinus pressure and voice change.    Neck: Negative for neck pain, neck stiffness and neck swelling.   Cardiovascular:  Negative for chest pain, leg swelling, palpitations and sob on exertion.   Eyes:  Negative for eye discharge, eye itching, eye pain, eye redness, photophobia, vision loss, double vision and blurred vision.   Respiratory:  Negative for chest tightness, cough, " sputum production, shortness of breath, wheezing and asthma.    Gastrointestinal:  Negative for abdominal pain, nausea, vomiting, constipation, diarrhea, bright red blood in stool and heartburn.   Endocrine: cold intolerance and heat intolerance.   Genitourinary:  Negative for dysuria, frequency, urgency, urine decreased, flank pain and hematuria.   Musculoskeletal:  Positive for muscle ache. Negative for pain, joint pain, joint swelling and back pain.   Skin:  Negative for rash, erythema, bruising and hives.   Allergic/Immunologic: Negative for environmental allergies, seasonal allergies, food allergies, eczema, asthma, hives, itching and sneezing.   Neurological:  Negative for dizziness, light-headedness, headaches, disorientation and altered mental status.   Psychiatric/Behavioral:  Negative for altered mental status, disorientation, confusion, agitation and nervous/anxious. The patient is not nervous/anxious.       Objective:     Physical Exam   Constitutional: She is oriented to person, place, and time. She appears well-developed. She is cooperative.  Non-toxic appearance. She appears ill. No distress.   HENT:   Head: Normocephalic and atraumatic.   Ears:   Right Ear: Hearing, external ear and ear canal normal. Tympanic membrane is bulging.   Left Ear: Hearing, external ear and ear canal normal. Tympanic membrane is bulging.   Nose: No mucosal edema, rhinorrhea or nasal deformity. No epistaxis. Right sinus exhibits no maxillary sinus tenderness and no frontal sinus tenderness. Left sinus exhibits no maxillary sinus tenderness and no frontal sinus tenderness.   Mouth/Throat: Uvula is midline and mucous membranes are normal. No trismus in the jaw. Normal dentition. No uvula swelling. Posterior oropharyngeal edema (moderate) and posterior oropharyngeal erythema present. No oropharyngeal exudate or cobblestoning. Tonsils are 2+ on the right. Tonsils are 3+ on the left. Tonsillar exudate.   Eyes: Conjunctivae and  lids are normal. No scleral icterus. Extraocular movement intact   Neck: Trachea normal and phonation normal. Neck supple. No edema present. No erythema present. No neck rigidity present.   Cardiovascular: Normal rate, regular rhythm, normal heart sounds and normal pulses.   Pulmonary/Chest: Effort normal and breath sounds normal. No stridor. No respiratory distress. She has no decreased breath sounds. She has no wheezes. She has no rhonchi. She has no rales.   Abdominal: Normal appearance.   Musculoskeletal: Normal range of motion.         General: No deformity. Normal range of motion.   Lymphadenopathy:     She has cervical adenopathy.   Neurological: She is alert and oriented to person, place, and time. She exhibits normal muscle tone. Coordination normal.   Skin: Skin is warm, dry, intact, not diaphoretic and not pale. No erythema   Psychiatric: Her speech is normal and behavior is normal. Judgment and thought content normal.   Nursing note and vitals reviewed.      Assessment:     1. Viral pharyngitis    2. Sore throat    3. Acute middle ear effusion, unspecified laterality        Plan:     Results for orders placed or performed in visit on 02/07/24   POCT Strep A, Molecular   Result Value Ref Range    Molecular Strep A, POC Negative Negative     Acceptable Yes    POCT Influenza A/B MOLECULAR   Result Value Ref Range    POC Molecular Influenza A Ag Negative Negative, Not Reported    POC Molecular Influenza B Ag Negative Negative, Not Reported     Acceptable Yes          Viral pharyngitis  -     predniSONE (DELTASONE) 20 MG tablet; Take 1 tablet (20 mg total) by mouth 2 (two) times daily. for 5 days  Dispense: 10 tablet; Refill: 0    Sore throat  -     POCT Strep A, Molecular  -     POCT Influenza A/B MOLECULAR  -     CULTURE, RESPIRATORY  - THROAT    Acute middle ear effusion, unspecified laterality  -     fluticasone propionate (FLONASE) 50 mcg/actuation nasal spray; 1 spray (50  mcg total) by Each Nostril route 2 (two) times daily. for 5 days  Dispense: 16 g; Refill: 0        Recommended for patient to drink hot tea with honey. Consider eating softer foods such as soup and broth for the next couple of days to prevent further throat irritation. Recommended for patient to refrain from acidic foods (such as tomatoes or caffeine) to prevent throat irritation for the next couple of days.  Patient can take OTC Acetaminophen (Tylenol) and/or Ibuprofen (Motrin) as needed for pain relief and/or fever relief. Continue to drink plenty of fluids. Follow up with PCP.           Patient Instructions   Please drink plenty of fluids.  Please get plenty of rest.  Please return here or go to the Emergency Department for any concerns or worsening of condition.  If you were prescribed prednisone, please take them to completion.  Use flonase as directed for 10 days. Throat culture done, patient will be called back with results.   Recommended for patient to drink hot tea with honey. Consider eating softer foods such as soup and broth for the next couple of days to prevent further throat irritation. Recommended for patient to refrain from acidic foods (such as tomatoes or caffeine) to prevent throat irritation for the next couple of days.    If you do not have Hypertension or any history of palpitations, it is ok to take over the counter Sudafed or Mucinex D or Allegra-D or Claritin-D or Zyrtec-D.  If you do take one of the above, it is ok to combine that with plain over the counter Mucinex or Allegra or Claritin or Zyrtec.  If for example you are taking Zyrtec -D, you can combine that with Mucinex, but not Mucinex-D.  If you are taking Mucinex-D, you can combine that with plain Allegra or Claritin or Zyrtec.   If you do have Hypertension or palpitations, it is safe to take Coricidin HBP for relief of sinus symptoms.  If not allergic, please take over the counter Tylenol (Acetaminophen) and/or Motrin (Ibuprofen)  as directed for control of pain and/or fever.  Please follow up with your primary care doctor or specialist as needed.    If you  smoke, please stop smoking.

## 2024-02-08 NOTE — PATIENT INSTRUCTIONS
Please drink plenty of fluids.  Please get plenty of rest.  Please return here or go to the Emergency Department for any concerns or worsening of condition.  If you were prescribed prednisone, please take them to completion.  Use flonase as directed for 10 days. Throat culture done, patient will be called back with results.   Recommended for patient to drink hot tea with honey. Consider eating softer foods such as soup and broth for the next couple of days to prevent further throat irritation. Recommended for patient to refrain from acidic foods (such as tomatoes or caffeine) to prevent throat irritation for the next couple of days.    If you do not have Hypertension or any history of palpitations, it is ok to take over the counter Sudafed or Mucinex D or Allegra-D or Claritin-D or Zyrtec-D.  If you do take one of the above, it is ok to combine that with plain over the counter Mucinex or Allegra or Claritin or Zyrtec.  If for example you are taking Zyrtec -D, you can combine that with Mucinex, but not Mucinex-D.  If you are taking Mucinex-D, you can combine that with plain Allegra or Claritin or Zyrtec.   If you do have Hypertension or palpitations, it is safe to take Coricidin HBP for relief of sinus symptoms.  If not allergic, please take over the counter Tylenol (Acetaminophen) and/or Motrin (Ibuprofen) as directed for control of pain and/or fever.  Please follow up with your primary care doctor or specialist as needed.    If you  smoke, please stop smoking.

## 2024-02-08 NOTE — PROGRESS NOTES
"Subjective:      Patient ID: Venkatesh Pritchett is a 43 y.o. female.    Vitals:  height is 5' 4" (1.626 m) and weight is 88 kg (194 lb). Her oral temperature is 97 °F (36.1 °C). Her blood pressure is 104/86 and her pulse is 66. Her respiration is 18 and oxygen saturation is 98%.     Chief Complaint: Sore Throat    44 y/o female presents to clinic with chief complaint of sore throat, subclinical fever, ear pressure bilaterally, pain with swallowing, body aches, chills. Symptoms started one day ago. Pain is 8/10. Patient has taken tylenol with no relief. Denies any recent ill exposures.  Denies any recent travel.  Denies history of seasonal allergies. Denies numbness or tingling. Denies radiation of pain. Denies  chest pain, shortness of breath, abdominal pain, nausea, vomiting, diarrhea, or rashes.            Sore Throat   This is a new problem. The current episode started yesterday. There has been no fever. Associated symptoms include ear pain, swollen glands and trouble swallowing. Pertinent negatives include no abdominal pain, congestion, coughing, diarrhea, ear discharge, headaches, neck pain, shortness of breath or vomiting. She has tried acetaminophen for the symptoms.       Constitution: Positive for chills and fever. Negative for activity change, appetite change, sweating, fatigue, generalized weakness and international travel in last 60 days.   HENT:  Positive for ear pain, sore throat and trouble swallowing. Negative for ear discharge, tinnitus, hearing loss, congestion, nosebleeds, foreign body in nose, postnasal drip, sinus pain, sinus pressure and voice change.    Neck: Negative for neck pain, neck stiffness and neck swelling.   Cardiovascular:  Negative for chest pain, leg swelling, palpitations and sob on exertion.   Eyes:  Negative for eye discharge, eye itching, eye pain, eye redness, photophobia, vision loss, double vision and blurred vision.   Respiratory:  Negative for chest tightness, cough, " sputum production, shortness of breath, wheezing and asthma.    Gastrointestinal:  Negative for abdominal pain, nausea, vomiting, constipation, diarrhea, bright red blood in stool and heartburn.   Endocrine: cold intolerance and heat intolerance.   Genitourinary:  Negative for dysuria, frequency, urgency, urine decreased, flank pain and hematuria.   Musculoskeletal:  Positive for muscle ache. Negative for pain, joint pain, joint swelling and back pain.   Skin:  Negative for rash, erythema, bruising and hives.   Allergic/Immunologic: Negative for environmental allergies, seasonal allergies, food allergies, eczema, asthma, hives, itching and sneezing.   Neurological:  Negative for dizziness, light-headedness, headaches, disorientation and altered mental status.   Psychiatric/Behavioral:  Negative for altered mental status, disorientation, confusion, agitation and nervous/anxious. The patient is not nervous/anxious.       Objective:     Physical Exam   Constitutional: She is oriented to person, place, and time. She appears well-developed. She is cooperative.  Non-toxic appearance. She appears ill. No distress.   HENT:   Head: Normocephalic and atraumatic.   Ears:   Right Ear: Hearing, external ear and ear canal normal. Tympanic membrane is bulging.   Left Ear: Hearing, external ear and ear canal normal. Tympanic membrane is bulging.   Nose: No mucosal edema, rhinorrhea or nasal deformity. No epistaxis. Right sinus exhibits no maxillary sinus tenderness and no frontal sinus tenderness. Left sinus exhibits no maxillary sinus tenderness and no frontal sinus tenderness.   Mouth/Throat: Uvula is midline and mucous membranes are normal. No trismus in the jaw. Normal dentition. No uvula swelling. Posterior oropharyngeal edema (moderate) and posterior oropharyngeal erythema present. No oropharyngeal exudate or cobblestoning. Tonsils are 2+ on the right. Tonsils are 3+ on the left. Tonsillar exudate.   Eyes: Conjunctivae and  lids are normal. No scleral icterus. Extraocular movement intact   Neck: Trachea normal and phonation normal. Neck supple. No edema present. No erythema present. No neck rigidity present.   Cardiovascular: Normal rate, regular rhythm, normal heart sounds and normal pulses.   Pulmonary/Chest: Effort normal and breath sounds normal. No stridor. No respiratory distress. She has no decreased breath sounds. She has no wheezes. She has no rhonchi. She has no rales.   Abdominal: Normal appearance.   Musculoskeletal: Normal range of motion.         General: No deformity. Normal range of motion.   Lymphadenopathy:     She has cervical adenopathy.   Neurological: She is alert and oriented to person, place, and time. She exhibits normal muscle tone. Coordination normal.   Skin: Skin is warm, dry, intact, not diaphoretic and not pale. No erythema   Psychiatric: Her speech is normal and behavior is normal. Judgment and thought content normal.   Nursing note and vitals reviewed.      Assessment:     1. Viral pharyngitis    2. Sore throat        Plan:       Viral pharyngitis    Sore throat  -     POCT Strep A, Molecular  -     POCT Influenza A/B MOLECULAR  -     CULTURE, RESPIRATORY  - THROAT

## 2024-02-12 ENCOUNTER — PATIENT MESSAGE (OUTPATIENT)
Dept: INTERNAL MEDICINE | Facility: CLINIC | Age: 44
End: 2024-02-12
Payer: COMMERCIAL

## 2024-02-12 ENCOUNTER — TELEPHONE (OUTPATIENT)
Dept: INTERNAL MEDICINE | Facility: CLINIC | Age: 44
End: 2024-02-12
Payer: COMMERCIAL

## 2024-02-12 ENCOUNTER — HOSPITAL ENCOUNTER (OUTPATIENT)
Dept: RADIOLOGY | Facility: HOSPITAL | Age: 44
Discharge: HOME | End: 2024-02-12
Attending: INTERNAL MEDICINE
Payer: COMMERCIAL

## 2024-02-12 ENCOUNTER — OFFICE VISIT (OUTPATIENT)
Dept: INTERNAL MEDICINE | Facility: CLINIC | Age: 44
End: 2024-02-12
Payer: COMMERCIAL

## 2024-02-12 VITALS
DIASTOLIC BLOOD PRESSURE: 64 MMHG | SYSTOLIC BLOOD PRESSURE: 90 MMHG | HEIGHT: 64 IN | WEIGHT: 174.81 LBS | HEART RATE: 94 BPM | RESPIRATION RATE: 16 BRPM | BODY MASS INDEX: 29.84 KG/M2 | OXYGEN SATURATION: 98 % | TEMPERATURE: 98 F

## 2024-02-12 DIAGNOSIS — N39.0 URINARY TRACT INFECTION WITHOUT HEMATURIA, SITE UNSPECIFIED: ICD-10-CM

## 2024-02-12 DIAGNOSIS — M25.551 PAIN OF RIGHT HIP: ICD-10-CM

## 2024-02-12 DIAGNOSIS — R43.9 PROBLEMS WITH SMELL AND TASTE: ICD-10-CM

## 2024-02-12 DIAGNOSIS — Z00.00 WELLNESS EXAMINATION: ICD-10-CM

## 2024-02-12 DIAGNOSIS — J06.9 UPPER RESPIRATORY TRACT INFECTION, UNSPECIFIED TYPE: Primary | ICD-10-CM

## 2024-02-12 LAB
BACTERIA THROAT CULT: NORMAL
INFLUENZA A, MOLECULAR: NEGATIVE
INFLUENZA B, MOLECULAR: NEGATIVE
SARS-COV-2 RNA RESP QL NAA+PROBE: DETECTED
SPECIMEN SOURCE: NORMAL

## 2024-02-12 PROCEDURE — 73502 X-RAY EXAM HIP UNI 2-3 VIEWS: CPT | Mod: TC,PO,RT

## 2024-02-12 PROCEDURE — 87635 SARS-COV-2 COVID-19 AMP PRB: CPT | Performed by: INTERNAL MEDICINE

## 2024-02-12 PROCEDURE — 73502 X-RAY EXAM HIP UNI 2-3 VIEWS: CPT | Mod: 26,RT,, | Performed by: RADIOLOGY

## 2024-02-12 PROCEDURE — 87502 INFLUENZA DNA AMP PROBE: CPT | Mod: PO | Performed by: INTERNAL MEDICINE

## 2024-02-12 PROCEDURE — 3074F SYST BP LT 130 MM HG: CPT | Mod: CPTII,S$GLB,, | Performed by: INTERNAL MEDICINE

## 2024-02-12 PROCEDURE — 3008F BODY MASS INDEX DOCD: CPT | Mod: CPTII,S$GLB,, | Performed by: INTERNAL MEDICINE

## 2024-02-12 PROCEDURE — 3078F DIAST BP <80 MM HG: CPT | Mod: CPTII,S$GLB,, | Performed by: INTERNAL MEDICINE

## 2024-02-12 PROCEDURE — 3044F HG A1C LEVEL LT 7.0%: CPT | Mod: CPTII,S$GLB,, | Performed by: INTERNAL MEDICINE

## 2024-02-12 PROCEDURE — 1159F MED LIST DOCD IN RCRD: CPT | Mod: CPTII,S$GLB,, | Performed by: INTERNAL MEDICINE

## 2024-02-12 PROCEDURE — 99396 PREV VISIT EST AGE 40-64: CPT | Mod: S$GLB,,, | Performed by: INTERNAL MEDICINE

## 2024-02-12 PROCEDURE — 99999 PR PBB SHADOW E&M-EST. PATIENT-LVL IV: CPT | Mod: PBBFAC,,, | Performed by: INTERNAL MEDICINE

## 2024-02-12 RX ORDER — CELECOXIB 200 MG/1
CAPSULE ORAL
Qty: 20 CAPSULE | Refills: 0 | Status: SHIPPED | OUTPATIENT
Start: 2024-02-12 | End: 2024-04-16

## 2024-02-12 RX ORDER — NIRMATRELVIR AND RITONAVIR 300-100 MG
KIT ORAL
Qty: 30 TABLET | Refills: 0 | Status: SHIPPED | OUTPATIENT
Start: 2024-02-12 | End: 2024-02-17

## 2024-02-12 NOTE — PROGRESS NOTES
Subjective:       Patient ID: Venkatesh Pritchett is a 43 y.o. female.    Chief Complaint: Leg Pain (X 6 months), no taste or smell, and Nasal Congestion (With clear mucus)    Leg Pain     Pt with recent URI symptoms - she lost smell and taste. As hip pain for several months.  Denies pregnancy - denies CP or SOB.  Review of Systems   Respiratory:  Negative for shortness of breath (PND or orthopnea).    Cardiovascular:  Negative for chest pain (arm pain or jaw pain).   Gastrointestinal:  Negative for abdominal pain, diarrhea, nausea and vomiting.   Genitourinary:  Negative for dysuria.   Neurological:  Negative for seizures, syncope and headaches.       Objective:      Physical Exam  Constitutional:       General: She is not in acute distress.     Appearance: She is well-developed.   HENT:      Head: Normocephalic.   Eyes:      Pupils: Pupils are equal, round, and reactive to light.   Neck:      Thyroid: No thyromegaly.      Vascular: No JVD.   Cardiovascular:      Rate and Rhythm: Normal rate and regular rhythm.      Heart sounds: Normal heart sounds. No murmur heard.     No friction rub. No gallop.   Pulmonary:      Effort: Pulmonary effort is normal.      Breath sounds: Normal breath sounds. No wheezing or rales.   Abdominal:      General: Bowel sounds are normal. There is no distension.      Palpations: Abdomen is soft. There is no mass.      Tenderness: There is no abdominal tenderness. There is no guarding or rebound.   Musculoskeletal:      Cervical back: Neck supple.   Lymphadenopathy:      Cervical: No cervical adenopathy.   Skin:     General: Skin is warm and dry.   Neurological:      Mental Status: She is alert and oriented to person, place, and time.      Deep Tendon Reflexes: Reflexes are normal and symmetric.   Psychiatric:         Behavior: Behavior normal.         Thought Content: Thought content normal.         Judgment: Judgment normal.         Assessment:       1. Upper respiratory tract infection,  unspecified type    2. Pain of right hip    3. Wellness examination    4. Urinary tract infection without hematuria, site unspecified    5. Problems with smell and taste        Plan:   Upper respiratory tract infection, unspecified type  -     Influenza A & B by Molecular    Pain of right hip  -     X-Ray Hip 2 or 3 views Right (with Pelvis when performed); Future; Expected date: 02/12/2024    Wellness examination  -     CBC Auto Differential; Future; Expected date: 02/12/2024  -     Comprehensive Metabolic Panel; Future; Expected date: 02/12/2024  -     Lipid Panel; Future; Expected date: 02/12/2024  -     TSH; Future; Expected date: 02/12/2024  -     Hemoglobin A1C; Future; Expected date: 02/12/2024  -     Vitamin D; Future; Expected date: 02/12/2024  -     Urinalysis; Future; Expected date: 02/12/2024  -     Urine culture; Future; Expected date: 02/12/2024  -     POCT Urine Pregnancy    Urinary tract infection without hematuria, site unspecified  -     Urinalysis; Future; Expected date: 02/12/2024  -     Urine culture; Future; Expected date: 02/12/2024  -     POCT Urine Pregnancy    Problems with smell and taste  -     COVID-19 Routine Screening    Other orders  -     celecoxib (CELEBREX) 200 MG capsule; Take One capsule by mouth in the morning with breakfast  Dispense: 20 capsule; Refill: 0

## 2024-02-14 ENCOUNTER — TELEPHONE (OUTPATIENT)
Dept: INTERNAL MEDICINE | Facility: CLINIC | Age: 44
End: 2024-02-14
Payer: COMMERCIAL

## 2024-02-14 NOTE — TELEPHONE ENCOUNTER
----- Message from Diomedes Araujo sent at 2/12/2024  4:32 PM CST -----  Contact: [phone]@[email or web address]  Good afternoon patient would like a call back to discuss her lab results. Please give patient a call back  [phone]

## 2024-02-14 NOTE — TELEPHONE ENCOUNTER
Please review 2/12 u/a and culture that is now back  And advise what we can tell patient.    She is calling again today for test results.    I called and told her we would get your comments.   she says she Feels vaginal pressure without urinating. Her period is about start.     What can we recommend? Otc azo?    Thanks bentley

## 2024-02-15 ENCOUNTER — TELEPHONE (OUTPATIENT)
Dept: INTERNAL MEDICINE | Facility: CLINIC | Age: 44
End: 2024-02-15

## 2024-02-15 DIAGNOSIS — N39.0 URINARY TRACT INFECTION WITHOUT HEMATURIA, SITE UNSPECIFIED: Primary | ICD-10-CM

## 2024-02-23 ENCOUNTER — PATIENT MESSAGE (OUTPATIENT)
Dept: INTERNAL MEDICINE | Facility: CLINIC | Age: 44
End: 2024-02-23
Payer: COMMERCIAL

## 2024-02-25 ENCOUNTER — PATIENT MESSAGE (OUTPATIENT)
Dept: INTERNAL MEDICINE | Facility: CLINIC | Age: 44
End: 2024-02-25
Payer: COMMERCIAL

## 2024-03-13 ENCOUNTER — PATIENT MESSAGE (OUTPATIENT)
Dept: INTERNAL MEDICINE | Facility: CLINIC | Age: 44
End: 2024-03-13
Payer: COMMERCIAL

## 2024-03-13 ENCOUNTER — LAB VISIT (OUTPATIENT)
Dept: LAB | Facility: HOSPITAL | Age: 44
End: 2024-03-13
Attending: INTERNAL MEDICINE
Payer: COMMERCIAL

## 2024-03-13 DIAGNOSIS — Z00.00 WELLNESS EXAMINATION: ICD-10-CM

## 2024-03-13 LAB
25(OH)D3+25(OH)D2 SERPL-MCNC: 22 NG/ML (ref 30–96)
ALBUMIN SERPL BCP-MCNC: 3.8 G/DL (ref 3.5–5.2)
ALP SERPL-CCNC: 88 U/L (ref 55–135)
ALT SERPL W/O P-5'-P-CCNC: 9 U/L (ref 10–44)
ANION GAP SERPL CALC-SCNC: 8 MMOL/L (ref 8–16)
AST SERPL-CCNC: 11 U/L (ref 10–40)
BASOPHILS # BLD AUTO: 0.05 K/UL (ref 0–0.2)
BASOPHILS NFR BLD: 0.6 % (ref 0–1.9)
BILIRUB SERPL-MCNC: 1.5 MG/DL (ref 0.1–1)
BUN SERPL-MCNC: 12 MG/DL (ref 6–20)
CALCIUM SERPL-MCNC: 10.2 MG/DL (ref 8.7–10.5)
CHLORIDE SERPL-SCNC: 104 MMOL/L (ref 95–110)
CHOLEST SERPL-MCNC: 185 MG/DL (ref 120–199)
CHOLEST/HDLC SERPL: 2.9 {RATIO} (ref 2–5)
CO2 SERPL-SCNC: 24 MMOL/L (ref 23–29)
CREAT SERPL-MCNC: 0.7 MG/DL (ref 0.5–1.4)
DIFFERENTIAL METHOD BLD: ABNORMAL
EOSINOPHIL # BLD AUTO: 0.1 K/UL (ref 0–0.5)
EOSINOPHIL NFR BLD: 1.4 % (ref 0–8)
ERYTHROCYTE [DISTWIDTH] IN BLOOD BY AUTOMATED COUNT: 13.5 % (ref 11.5–14.5)
EST. GFR  (NO RACE VARIABLE): >60 ML/MIN/1.73 M^2
ESTIMATED AVG GLUCOSE: 103 MG/DL (ref 68–131)
GLUCOSE SERPL-MCNC: 92 MG/DL (ref 70–110)
HBA1C MFR BLD: 5.2 % (ref 4–5.6)
HCT VFR BLD AUTO: 41.4 % (ref 37–48.5)
HDLC SERPL-MCNC: 63 MG/DL (ref 40–75)
HDLC SERPL: 34.1 % (ref 20–50)
HGB BLD-MCNC: 13.1 G/DL (ref 12–16)
IMM GRANULOCYTES # BLD AUTO: 0.02 K/UL (ref 0–0.04)
IMM GRANULOCYTES NFR BLD AUTO: 0.2 % (ref 0–0.5)
LDLC SERPL CALC-MCNC: 105.4 MG/DL (ref 63–159)
LYMPHOCYTES # BLD AUTO: 2.4 K/UL (ref 1–4.8)
LYMPHOCYTES NFR BLD: 27.8 % (ref 18–48)
MCH RBC QN AUTO: 27.5 PG (ref 27–31)
MCHC RBC AUTO-ENTMCNC: 31.6 G/DL (ref 32–36)
MCV RBC AUTO: 87 FL (ref 82–98)
MONOCYTES # BLD AUTO: 0.4 K/UL (ref 0.3–1)
MONOCYTES NFR BLD: 4.5 % (ref 4–15)
NEUTROPHILS # BLD AUTO: 5.7 K/UL (ref 1.8–7.7)
NEUTROPHILS NFR BLD: 65.5 % (ref 38–73)
NONHDLC SERPL-MCNC: 122 MG/DL
NRBC BLD-RTO: 0 /100 WBC
PLATELET # BLD AUTO: 271 K/UL (ref 150–450)
PMV BLD AUTO: 9.7 FL (ref 9.2–12.9)
POTASSIUM SERPL-SCNC: 4.8 MMOL/L (ref 3.5–5.1)
PROT SERPL-MCNC: 7.7 G/DL (ref 6–8.4)
RBC # BLD AUTO: 4.76 M/UL (ref 4–5.4)
SODIUM SERPL-SCNC: 136 MMOL/L (ref 136–145)
TRIGL SERPL-MCNC: 83 MG/DL (ref 30–150)
TSH SERPL DL<=0.005 MIU/L-ACNC: 1.29 UIU/ML (ref 0.4–4)
WBC # BLD AUTO: 8.72 K/UL (ref 3.9–12.7)

## 2024-03-13 PROCEDURE — 84443 ASSAY THYROID STIM HORMONE: CPT | Performed by: INTERNAL MEDICINE

## 2024-03-13 PROCEDURE — 82306 VITAMIN D 25 HYDROXY: CPT | Performed by: INTERNAL MEDICINE

## 2024-03-13 PROCEDURE — 80053 COMPREHEN METABOLIC PANEL: CPT | Performed by: INTERNAL MEDICINE

## 2024-03-13 PROCEDURE — 80061 LIPID PANEL: CPT | Performed by: INTERNAL MEDICINE

## 2024-03-13 PROCEDURE — 85025 COMPLETE CBC W/AUTO DIFF WBC: CPT | Performed by: INTERNAL MEDICINE

## 2024-03-13 PROCEDURE — 36415 COLL VENOUS BLD VENIPUNCTURE: CPT | Performed by: INTERNAL MEDICINE

## 2024-03-13 PROCEDURE — 83036 HEMOGLOBIN GLYCOSYLATED A1C: CPT | Performed by: INTERNAL MEDICINE

## 2024-03-26 ENCOUNTER — HOSPITAL ENCOUNTER (EMERGENCY)
Facility: HOSPITAL | Age: 44
Discharge: HOME | End: 2024-03-26
Attending: EMERGENCY MEDICINE
Payer: COMMERCIAL

## 2024-03-26 VITALS
WEIGHT: 177 LBS | HEIGHT: 64 IN | RESPIRATION RATE: 18 BRPM | TEMPERATURE: 98 F | HEART RATE: 77 BPM | DIASTOLIC BLOOD PRESSURE: 64 MMHG | OXYGEN SATURATION: 100 % | SYSTOLIC BLOOD PRESSURE: 129 MMHG | BODY MASS INDEX: 30.22 KG/M2

## 2024-03-26 DIAGNOSIS — R10.9 LEFT SIDED ABDOMINAL PAIN: ICD-10-CM

## 2024-03-26 LAB
ALBUMIN SERPL BCP-MCNC: 4.1 G/DL (ref 3.5–5.2)
ALP SERPL-CCNC: 89 U/L (ref 55–135)
ALT SERPL W/O P-5'-P-CCNC: 9 U/L (ref 10–44)
ANION GAP SERPL CALC-SCNC: 12 MMOL/L (ref 8–16)
AST SERPL-CCNC: 12 U/L (ref 10–40)
B-HCG UR QL: NEGATIVE
BASOPHILS # BLD AUTO: 0.05 K/UL (ref 0–0.2)
BASOPHILS NFR BLD: 0.3 % (ref 0–1.9)
BILIRUB SERPL-MCNC: 1.5 MG/DL (ref 0.1–1)
BILIRUB UR QL STRIP: NEGATIVE
BUN SERPL-MCNC: 14 MG/DL (ref 6–20)
CALCIUM SERPL-MCNC: 9.7 MG/DL (ref 8.7–10.5)
CHLORIDE SERPL-SCNC: 101 MMOL/L (ref 95–110)
CLARITY UR: CLEAR
CO2 SERPL-SCNC: 23 MMOL/L (ref 23–29)
COLOR UR: YELLOW
CREAT SERPL-MCNC: 0.9 MG/DL (ref 0.5–1.4)
DIFFERENTIAL METHOD BLD: ABNORMAL
EOSINOPHIL # BLD AUTO: 0.1 K/UL (ref 0–0.5)
EOSINOPHIL NFR BLD: 0.5 % (ref 0–8)
ERYTHROCYTE [DISTWIDTH] IN BLOOD BY AUTOMATED COUNT: 13 % (ref 11.5–14.5)
EST. GFR  (NO RACE VARIABLE): >60 ML/MIN/1.73 M^2
GLUCOSE SERPL-MCNC: 109 MG/DL (ref 70–110)
GLUCOSE UR QL STRIP: NEGATIVE
HCT VFR BLD AUTO: 39.1 % (ref 37–48.5)
HGB BLD-MCNC: 13.2 G/DL (ref 12–16)
HGB UR QL STRIP: ABNORMAL
IMM GRANULOCYTES # BLD AUTO: 0.06 K/UL (ref 0–0.04)
IMM GRANULOCYTES NFR BLD AUTO: 0.4 % (ref 0–0.5)
KETONES UR QL STRIP: NEGATIVE
LEUKOCYTE ESTERASE UR QL STRIP: NEGATIVE
LIPASE SERPL-CCNC: 20 U/L (ref 4–60)
LYMPHOCYTES # BLD AUTO: 2.4 K/UL (ref 1–4.8)
LYMPHOCYTES NFR BLD: 14.2 % (ref 18–48)
MCH RBC QN AUTO: 28 PG (ref 27–31)
MCHC RBC AUTO-ENTMCNC: 33.8 G/DL (ref 32–36)
MCV RBC AUTO: 83 FL (ref 82–98)
MICROSCOPIC COMMENT: ABNORMAL
MONOCYTES # BLD AUTO: 0.6 K/UL (ref 0.3–1)
MONOCYTES NFR BLD: 3.8 % (ref 4–15)
NEUTROPHILS # BLD AUTO: 13.5 K/UL (ref 1.8–7.7)
NEUTROPHILS NFR BLD: 80.8 % (ref 38–73)
NITRITE UR QL STRIP: NEGATIVE
NRBC BLD-RTO: 0 /100 WBC
PH UR STRIP: 6 [PH] (ref 5–8)
PLATELET # BLD AUTO: 287 K/UL (ref 150–450)
PMV BLD AUTO: 9.3 FL (ref 9.2–12.9)
POTASSIUM SERPL-SCNC: 4 MMOL/L (ref 3.5–5.1)
PROT SERPL-MCNC: 8.2 G/DL (ref 6–8.4)
PROT UR QL STRIP: ABNORMAL
RBC # BLD AUTO: 4.72 M/UL (ref 4–5.4)
RBC #/AREA URNS HPF: 7 /HPF (ref 0–4)
SODIUM SERPL-SCNC: 136 MMOL/L (ref 136–145)
SP GR UR STRIP: 1.03 (ref 1–1.03)
SQUAMOUS #/AREA URNS HPF: 2 /HPF
URN SPEC COLLECT METH UR: ABNORMAL
UROBILINOGEN UR STRIP-ACNC: NEGATIVE EU/DL
WBC # BLD AUTO: 16.71 K/UL (ref 3.9–12.7)
WBC #/AREA URNS HPF: 2 /HPF (ref 0–5)

## 2024-03-26 PROCEDURE — 99284 EMERGENCY DEPT VISIT MOD MDM: CPT | Mod: 25

## 2024-03-26 PROCEDURE — 80053 COMPREHEN METABOLIC PANEL: CPT | Performed by: PHYSICIAN ASSISTANT

## 2024-03-26 PROCEDURE — 83690 ASSAY OF LIPASE: CPT | Performed by: PHYSICIAN ASSISTANT

## 2024-03-26 PROCEDURE — 96375 TX/PRO/DX INJ NEW DRUG ADDON: CPT

## 2024-03-26 PROCEDURE — 81025 URINE PREGNANCY TEST: CPT | Performed by: PHYSICIAN ASSISTANT

## 2024-03-26 PROCEDURE — 63600175 PHARM REV CODE 636 W HCPCS: Performed by: EMERGENCY MEDICINE

## 2024-03-26 PROCEDURE — 85025 COMPLETE CBC W/AUTO DIFF WBC: CPT | Performed by: PHYSICIAN ASSISTANT

## 2024-03-26 PROCEDURE — 96374 THER/PROPH/DIAG INJ IV PUSH: CPT

## 2024-03-26 PROCEDURE — 81000 URINALYSIS NONAUTO W/SCOPE: CPT | Performed by: PHYSICIAN ASSISTANT

## 2024-03-26 PROCEDURE — 96361 HYDRATE IV INFUSION ADD-ON: CPT

## 2024-03-26 PROCEDURE — 25000003 PHARM REV CODE 250: Performed by: EMERGENCY MEDICINE

## 2024-03-26 RX ORDER — ALUMINUM HYDROXIDE, MAGNESIUM HYDROXIDE, AND SIMETHICONE 2400; 240; 2400 MG/30ML; MG/30ML; MG/30ML
10 SUSPENSION ORAL EVERY 6 HOURS PRN
Qty: 335 ML | Refills: 0 | Status: SHIPPED | OUTPATIENT
Start: 2024-03-26 | End: 2025-03-26

## 2024-03-26 RX ORDER — METOCLOPRAMIDE HYDROCHLORIDE 5 MG/ML
10 INJECTION INTRAMUSCULAR; INTRAVENOUS
Status: COMPLETED | OUTPATIENT
Start: 2024-03-26 | End: 2024-03-26

## 2024-03-26 RX ORDER — DICYCLOMINE HYDROCHLORIDE 20 MG/1
20 TABLET ORAL 3 TIMES DAILY PRN
Qty: 15 TABLET | Refills: 0 | Status: SHIPPED | OUTPATIENT
Start: 2024-03-26 | End: 2024-04-01

## 2024-03-26 RX ORDER — ALUMINUM HYDROXIDE, MAGNESIUM HYDROXIDE, AND SIMETHICONE 1200; 120; 1200 MG/30ML; MG/30ML; MG/30ML
30 SUSPENSION ORAL ONCE
Status: COMPLETED | OUTPATIENT
Start: 2024-03-26 | End: 2024-03-26

## 2024-03-26 RX ORDER — KETOROLAC TROMETHAMINE 30 MG/ML
15 INJECTION, SOLUTION INTRAMUSCULAR; INTRAVENOUS
Status: COMPLETED | OUTPATIENT
Start: 2024-03-26 | End: 2024-03-26

## 2024-03-26 RX ADMIN — SODIUM CHLORIDE 500 ML: 9 INJECTION, SOLUTION INTRAVENOUS at 06:03

## 2024-03-26 RX ADMIN — KETOROLAC TROMETHAMINE 15 MG: 30 INJECTION, SOLUTION INTRAMUSCULAR; INTRAVENOUS at 05:03

## 2024-03-26 RX ADMIN — ALUMINUM HYDROXIDE, MAGNESIUM HYDROXIDE, AND SIMETHICONE 30 ML: 200; 200; 20 SUSPENSION ORAL at 08:03

## 2024-03-26 RX ADMIN — METOCLOPRAMIDE 10 MG: 5 INJECTION, SOLUTION INTRAMUSCULAR; INTRAVENOUS at 06:03

## 2024-03-26 NOTE — ED TRIAGE NOTES
Pt presents to ED today c/o acute onset of left flank pain x 2 hours ago   Denies N/v or  associated urinary sx

## 2024-03-26 NOTE — ED PROVIDER NOTES
Encounter Date: 3/26/2024       History     Chief Complaint   Patient presents with    Flank Pain     Left sided flank pain that started 2 hours PTA. No changes in urination or BM, no N/V.     Venkatesh Pritchett is a 43 y.o. female who  has a past medical history of IBS (irritable bowel syndrome).    The patient presents to the ED due to abdominal pain.  Patient started having left-sided abdominal pain earlier today.  She reports crampy abdominal pain.  She notes she is fasting for Ramadan and confirms her history of IBS. She states the pain feels similar to IBS.  She denies any fever vomiting or urinary complaints. No exacerbating relieving factors or other symptoms.  No other concerns noted today.    The history is provided by the patient and the spouse.     Review of patient's allergies indicates:  No Known Allergies  Past Medical History:   Diagnosis Date    IBS (irritable bowel syndrome)      Past Surgical History:   Procedure Laterality Date    DILATION AND CURETTAGE OF UTERUS  2000    MAB     Family History   Problem Relation Age of Onset    No Known Problems Father     No Known Problems Mother     Hypertension Neg Hx     Colon cancer Neg Hx     Amblyopia Neg Hx     Blindness Neg Hx     Cataracts Neg Hx     Glaucoma Neg Hx     Macular degeneration Neg Hx     Retinal detachment Neg Hx     Strabismus Neg Hx     Breast cancer Neg Hx     Ovarian cancer Neg Hx     Celiac disease Neg Hx     Esophageal cancer Neg Hx     Rectal cancer Neg Hx     Stomach cancer Neg Hx     Liver disease Neg Hx     Liver cancer Neg Hx      Social History     Tobacco Use    Smoking status: Never    Smokeless tobacco: Never   Substance Use Topics    Alcohol use: No    Drug use: No     Review of Systems   Constitutional:  Negative for chills and fever.   HENT:  Negative for sore throat.    Respiratory:  Negative for cough and shortness of breath.    Cardiovascular:  Negative for chest pain.   Gastrointestinal:  Positive for abdominal  pain. Negative for nausea and vomiting.   Genitourinary:  Negative for dysuria, frequency and urgency.   Musculoskeletal:  Negative for back pain, neck pain and neck stiffness.   Skin:  Negative for rash and wound.   Neurological:  Negative for syncope and weakness.   Hematological:  Does not bruise/bleed easily.   Psychiatric/Behavioral:  Negative for agitation, behavioral problems and confusion.        Physical Exam     Initial Vitals [03/26/24 1654]   BP Pulse Resp Temp SpO2   123/79 72 18 97.9 °F (36.6 °C) 98 %      MAP       --         Physical Exam    Nursing note and vitals reviewed.  Constitutional: She appears well-developed and well-nourished. She is not diaphoretic. No distress.   HENT:   Head: Normocephalic and atraumatic.   Mouth/Throat: Oropharynx is clear and moist.   Eyes: EOM are normal. Pupils are equal, round, and reactive to light.   Neck: No tracheal deviation present.   Cardiovascular:  Normal rate, regular rhythm, normal heart sounds and intact distal pulses.           Pulmonary/Chest: Breath sounds normal. No stridor. No respiratory distress. She has no wheezes.   Abdominal: Abdomen is soft. Bowel sounds are normal. She exhibits no distension and no mass. There is abdominal tenderness.   Left upper abdominal pain.  No rebound or guarding.  No apparent tenderness to palpation of her left lower quadrant   Musculoskeletal:         General: No edema. Normal range of motion.     Neurological: She is alert and oriented to person, place, and time. No cranial nerve deficit or sensory deficit.   Skin: Skin is warm and dry. Capillary refill takes less than 2 seconds. No rash noted.   Psychiatric: She has a normal mood and affect.         ED Course   Procedures  Labs Reviewed   CBC W/ AUTO DIFFERENTIAL - Abnormal; Notable for the following components:       Result Value    WBC 16.71 (*)     Gran # (ANC) 13.5 (*)     Immature Grans (Abs) 0.06 (*)     Gran % 80.8 (*)     Lymph % 14.2 (*)     Mono % 3.8  (*)     All other components within normal limits   COMPREHENSIVE METABOLIC PANEL - Abnormal; Notable for the following components:    Total Bilirubin 1.5 (*)     ALT 9 (*)     All other components within normal limits   URINALYSIS, REFLEX TO URINE CULTURE - Abnormal; Notable for the following components:    Protein, UA Trace (*)     Occult Blood UA 1+ (*)     All other components within normal limits    Narrative:     Specimen Source->Urine   URINALYSIS MICROSCOPIC - Abnormal; Notable for the following components:    RBC, UA 7 (*)     All other components within normal limits    Narrative:     Specimen Source->Urine   LIPASE   PREGNANCY TEST, URINE RAPID   PREGNANCY TEST, URINE RAPID    Narrative:     Specimen Source->Urine   LIPASE          Imaging Results              X-Ray Abdomen Flat And Erect (Final result)  Result time 03/26/24 19:44:58      Final result by Hay Girard MD (03/26/24 19:44:58)                   Impression:      Negative Abdomen  .      Electronically signed by: Hay Girard  Date:    03/26/2024  Time:    19:44               Narrative:    EXAMINATION:  XR ABDOMEN FLAT AND ERECT    CLINICAL HISTORY:  Unspecified abdominal pain    TECHNIQUE:  Flat and erect AP views of the abdomen were performed.    COMPARISON:  None    FINDINGS:  Bowel gas pattern is not unusual.  There is no mass, organomegaly or abnormal calcification.  The bony structures are intact.                                       Medications   ketorolac injection 15 mg (15 mg Intravenous Given 3/26/24 1752)   sodium chloride 0.9% bolus 500 mL 500 mL (0 mLs Intravenous Stopped 3/26/24 1956)   aluminum-magnesium hydroxide-simethicone 200-200-20 mg/5 mL suspension 30 mL (30 mLs Oral Given 3/26/24 2012)   metoclopramide injection 10 mg (10 mg Intravenous Given 3/26/24 1849)     Medical Decision Making  Differential Diagnosis includes, but is not limited to:  AAA, aortic dissection, mesenteric ischemia, perforated viscous,  MI/ACS, SBO/volvulus, incarcerated/strangulated hernia, intussusception, ileus, appendicitis, cholecystitis, cholangitis, diverticulitis, esophagitis, hepatitis, nephrolithiasis, pancreatitis, gastroenteritis, colitis, IBD/IBS, biliary colic, GERD, PUD, constipation, UTI/pyelonephritis,  disorder.      Amount and/or Complexity of Data Reviewed  Labs:  Decision-making details documented in ED Course.  Radiology: ordered.    Risk  OTC drugs.  Prescription drug management.  Decision regarding hospitalization.  Risk Details: After taking into careful account the historical factors and physical exam findings of the patient's presentation today, in conjunction with the empirical and objective data obtained on ED workup, no acute emergent medical condition has been identified. The patient appears to be low risk for an emergent medical condition and I feel it is safe and appropriate at this time for the patient to be discharged to follow-up as detailed in their discharge instructions for reevaluation and possible continued outpatient workup and management. I have discussed the specifics of the workup with the patient and the patient has verbalized understanding of the details of the workup, the diagnosis, the treatment plan, and the need for outpatient follow-up.  Although the patient has no emergent etiology today this does not preclude the development of an emergent condition so in addition, I have advised the patient that they can return to the ED and/or activate EMS at any time with worsening of their symptoms, change of their symptoms, or with any other medical complaint.  The patient remained comfortable and stable during their visit in the ED.  Discharge and follow-up instructions discussed with the patient who expressed understanding and willingness to comply with my recommendations.                 ED Course as of 03/27/24 1203   Tue Mar 26, 2024   1831 CBC auto differential(!) [RN]   1832 Comprehensive metabolic  panel(!) [RN]   1832 Urinalysis Microscopic(!) [RN]   2021 Urinalysis, Reflex to Urine Culture Urine, Clean Catch(!) [RN]   2021 Urinalysis Microscopic(!) [RN]   2021 Patient is feeling better at this time.  No distress noted.  Discussed further evaluation of patient's symptoms with CT imaging to evaluate for an emergent cause including diverticulitis or renal stone given lab results however patient has declined further evaluation as her pain has resolved and is requesting DC.  Of note she does have a history of IBS and has been fasting is possible her symptoms might be related to her IBS.  She nevertheless can return if her symptoms return or worsen in any way.  Discussed close follow-up with her PCP and will treat symptomatically.  Return precautions discussed [RN]      ED Course User Index  [RN] Jarocho Sal Jr., MD                             Clinical Impression:  Final diagnoses:  [R10.9] Left sided abdominal pain          ED Disposition Condition    Discharge Stable          ED Prescriptions       Medication Sig Dispense Start Date End Date Auth. Provider    aluminum & magnesium hydroxide-simethicone (MYLANTA MAX STRENGTH) 400-400-40 mg/5 mL suspension Take 10 mLs by mouth every 6 (six) hours as needed for Indigestion. 335 mL 3/26/2024 3/26/2025 Jarocho Sal Jr., MD    dicyclomine (BENTYL) 20 mg tablet Take 1 tablet (20 mg total) by mouth 3 (three) times daily as needed. 15 tablet 3/26/2024 4/1/2024 Jarocho Sal Jr., MD          Follow-up Information       Follow up With Specialties Details Why Contact Info    Darlyn Carrion MD Internal Medicine In 3 days  [address]  [place] [address]  [phone]              Portions of this note were dictated using voice recognition software and may contain dictation related errors in spelling/grammar/syntax not found on text review       Jarocho Sal Jr., MD  03/27/24 0543

## 2024-03-27 ENCOUNTER — OFFICE VISIT (OUTPATIENT)
Dept: INTERNAL MEDICINE | Facility: CLINIC | Age: 44
End: 2024-03-27
Payer: COMMERCIAL

## 2024-03-27 ENCOUNTER — TELEPHONE (OUTPATIENT)
Dept: INTERNAL MEDICINE | Facility: CLINIC | Age: 44
End: 2024-03-27

## 2024-03-27 VITALS
OXYGEN SATURATION: 100 % | TEMPERATURE: 97 F | HEIGHT: 64 IN | SYSTOLIC BLOOD PRESSURE: 121 MMHG | RESPIRATION RATE: 20 BRPM | HEART RATE: 72 BPM | BODY MASS INDEX: 30.66 KG/M2 | WEIGHT: 179.56 LBS | DIASTOLIC BLOOD PRESSURE: 65 MMHG

## 2024-03-27 DIAGNOSIS — N39.0 URINARY TRACT INFECTION WITHOUT HEMATURIA, SITE UNSPECIFIED: ICD-10-CM

## 2024-03-27 DIAGNOSIS — R10.9 LEFT FLANK PAIN: ICD-10-CM

## 2024-03-27 DIAGNOSIS — R10.30 LOWER ABDOMINAL PAIN: Primary | ICD-10-CM

## 2024-03-27 DIAGNOSIS — R31.29 MICROHEMATURIA: ICD-10-CM

## 2024-03-27 LAB
BILIRUB SERPL-MCNC: NORMAL MG/DL
BLOOD URINE, POC: NORMAL
CLARITY, POC UA: NORMAL
COLOR, POC UA: YELLOW
GLUCOSE UR QL STRIP: NORMAL
KETONES UR QL STRIP: NORMAL
LEUKOCYTE ESTERASE URINE, POC: NORMAL
NITRITE, POC UA: NORMAL
PH, POC UA: 6
PROTEIN, POC: 30
SPECIFIC GRAVITY, POC UA: 1
UROBILINOGEN, POC UA: NORMAL

## 2024-03-27 PROCEDURE — 3044F HG A1C LEVEL LT 7.0%: CPT | Mod: CPTII,S$GLB,, | Performed by: INTERNAL MEDICINE

## 2024-03-27 PROCEDURE — 99999 PR PBB SHADOW E&M-EST. PATIENT-LVL IV: CPT | Mod: PBBFAC,,, | Performed by: INTERNAL MEDICINE

## 2024-03-27 PROCEDURE — 81002 URINALYSIS NONAUTO W/O SCOPE: CPT | Mod: S$GLB,,, | Performed by: INTERNAL MEDICINE

## 2024-03-27 PROCEDURE — 3078F DIAST BP <80 MM HG: CPT | Mod: CPTII,S$GLB,, | Performed by: INTERNAL MEDICINE

## 2024-03-27 PROCEDURE — 99214 OFFICE O/P EST MOD 30 MIN: CPT | Mod: S$GLB,,, | Performed by: INTERNAL MEDICINE

## 2024-03-27 PROCEDURE — 87086 URINE CULTURE/COLONY COUNT: CPT | Performed by: INTERNAL MEDICINE

## 2024-03-27 PROCEDURE — 3074F SYST BP LT 130 MM HG: CPT | Mod: CPTII,S$GLB,, | Performed by: INTERNAL MEDICINE

## 2024-03-27 PROCEDURE — 3008F BODY MASS INDEX DOCD: CPT | Mod: CPTII,S$GLB,, | Performed by: INTERNAL MEDICINE

## 2024-03-27 PROCEDURE — 1159F MED LIST DOCD IN RCRD: CPT | Mod: CPTII,S$GLB,, | Performed by: INTERNAL MEDICINE

## 2024-03-27 NOTE — TELEPHONE ENCOUNTER
Called patient. Per Dr. Cardenas, advised the patient that her recent urine had no concerning findings. She verbally acknowledged.

## 2024-03-27 NOTE — DISCHARGE INSTRUCTIONS

## 2024-03-27 NOTE — PROGRESS NOTES
History of present illness:   43-year-old lady established patient of .  The patient is generally healthy.  She presents today follow-up on abdominal pain.  Yesterday the patient developed some predominantly left lower mid abdominal discomfort.  Also seems to be having some left flank pain.  Denied any dysuria or hematuria.  No fever no chills.  A couple of episodes of vomiting after eating.  Denies any reflux symptomatologies.  She went to the emergency room yesterday and her symptoms were felt to be secondary to her irritable bowel.  Lab data noted serum lipase normal, chemistry profile normal, CBC normal other than mildly elevated white blood cell count.  Urinalysis fairly unremarkable.  Pregnancy test negative.  Discharged home.    Review of systems:  Constitutional: Currently without fever chills or generalized body aches.    HEENT:  No URI symptoms.  No hoarseness no dysphagia.    Respiratory:  No cough shortness of breath.    Cardiovascular:  No chest pain or palpitations and no syncope.  GI:  See HPI.  :  No dysuria frequency change in the color character urine.  Skin:  No rashes.      Physical examination:  General: Alert appropriately groomed lady no acute distress.    Vital signs:  All noted and reviewed as normal.    Eyes:  Sclerae white and nonicteric.    HEENT: Normocephalic.  Neck supple no masses no thyromegaly.    Lungs:  Clear to auscultation.    Cardiovascular:  Regular rate rhythm.  No significant murmur.  Abdomen:  Nondistended.  Soft and benign.  No discrete tenderness detected on direct palpation or rebound.  There is mild left CVA tenderness to percussion.  Skin no rashes.    Mental status:  Alert oriented affect mood all appropriate.      Data:  Reviewed the ED evaluation regarding clinic note, lab results, radiographic study reports        Impression:   Persistent left lower mid abdominal pain with some flank tenderness.  Rule out urolithiasis versus other.  Clinically she  feels a bit better today.      Plan:   CT scan abdomen and pelvis without contrast.    Recheck urinalysis and urine for culture and sensitivity.  Observe and let us know if any worsening changing symptoms.  Review after CT results

## 2024-03-27 NOTE — TELEPHONE ENCOUNTER
----- Message from Shamika Rivera sent at 3/27/2024  1:28 PM CDT -----  Contact: [phone] Patient  2TESTRESULTS    Type: Test Results    What test was performed? Urine    Who ordered the test? Dr ARLIN Cardenas    When and where were the test performed?  [facility]    Would you like response via [employer]: Pt stated she received her results in [facility] and noticed some were abnormal and asking if someone can call her to please explain the results. Thank you    Comments:

## 2024-03-28 LAB — BACTERIA UR CULT: NO GROWTH

## 2024-04-12 ENCOUNTER — PATIENT OUTREACH (OUTPATIENT)
Dept: ADMINISTRATIVE | Facility: HOSPITAL | Age: 44
End: 2024-04-12
Payer: COMMERCIAL

## 2024-04-12 NOTE — PROGRESS NOTES
Population Health Chart Review & Patient Outreach Details      Additional [facility] Health Notes:               Updates Requested / Reviewed:      Care Everywhere, , and Immunizations Reconciliation Completed or Queried: [facility] Topics Overdue:      [facility] Score: 3     Cervical Cancer Screening  Colon Cancer Screening  Mammogram                       Health Maintenance Topic(s) Outreach Outcomes & Actions Taken:    Primary Care Appt - Outreach Outcomes & Actions Taken  : Primary Care Appt Scheduled

## 2024-04-16 ENCOUNTER — OFFICE VISIT (OUTPATIENT)
Dept: INTERNAL MEDICINE | Facility: CLINIC | Age: 44
End: 2024-04-16
Payer: COMMERCIAL

## 2024-04-16 VITALS
BODY MASS INDEX: 30.77 KG/M2 | DIASTOLIC BLOOD PRESSURE: 72 MMHG | HEART RATE: 64 BPM | WEIGHT: 180.25 LBS | SYSTOLIC BLOOD PRESSURE: 126 MMHG | HEIGHT: 64 IN | OXYGEN SATURATION: 99 %

## 2024-04-16 DIAGNOSIS — M54.16 RIGHT LUMBAR RADICULOPATHY: ICD-10-CM

## 2024-04-16 DIAGNOSIS — Z12.31 SCREENING MAMMOGRAM, ENCOUNTER FOR: ICD-10-CM

## 2024-04-16 DIAGNOSIS — Z01.419 NORMAL GYNECOLOGIC EXAMINATION: ICD-10-CM

## 2024-04-16 DIAGNOSIS — Z12.11 SCREENING FOR COLON CANCER: ICD-10-CM

## 2024-04-16 DIAGNOSIS — R31.9 HEMATURIA, UNSPECIFIED TYPE: Primary | ICD-10-CM

## 2024-04-16 PROCEDURE — 3078F DIAST BP <80 MM HG: CPT | Mod: CPTII,S$GLB,, | Performed by: INTERNAL MEDICINE

## 2024-04-16 PROCEDURE — 99999 PR PBB SHADOW E&M-EST. PATIENT-LVL V: CPT | Mod: PBBFAC,,, | Performed by: INTERNAL MEDICINE

## 2024-04-16 PROCEDURE — 3008F BODY MASS INDEX DOCD: CPT | Mod: CPTII,S$GLB,, | Performed by: INTERNAL MEDICINE

## 2024-04-16 PROCEDURE — 3074F SYST BP LT 130 MM HG: CPT | Mod: CPTII,S$GLB,, | Performed by: INTERNAL MEDICINE

## 2024-04-16 PROCEDURE — 1159F MED LIST DOCD IN RCRD: CPT | Mod: CPTII,S$GLB,, | Performed by: INTERNAL MEDICINE

## 2024-04-16 PROCEDURE — 99214 OFFICE O/P EST MOD 30 MIN: CPT | Mod: S$GLB,,, | Performed by: INTERNAL MEDICINE

## 2024-04-16 PROCEDURE — 3044F HG A1C LEVEL LT 7.0%: CPT | Mod: CPTII,S$GLB,, | Performed by: INTERNAL MEDICINE

## 2024-04-16 RX ORDER — DICYCLOMINE HYDROCHLORIDE 20 MG/1
TABLET ORAL
Qty: 90 TABLET | Refills: 3 | Status: SHIPPED | OUTPATIENT
Start: 2024-04-16

## 2024-04-16 NOTE — PROGRESS NOTES
Subjective:       Patient ID: Venkatesh Pritchett is a 43 y.o. female.    Chief Complaint: Follow-up    Follow-up  Pertinent negatives include no abdominal pain, chest pain (arm pain or jaw pain), headaches, nausea or vomiting.   Pt with hematuria - she hdid have flank pain about two weeks ago - resolved - no hx of kidney stones.  No CP or SOB.Pt with chronic R sided hip , back and leg pain - no numbness or weakness - no bowel or bladder dysfunction.    Review of Systems   Respiratory:  Negative for shortness of breath (PND or orthopnea).    Cardiovascular:  Negative for chest pain (arm pain or jaw pain).   Gastrointestinal:  Negative for abdominal pain, diarrhea, nausea and vomiting.   Genitourinary:  Negative for dysuria.   Neurological:  Negative for seizures, syncope and headaches.       Objective:      Physical Exam  Constitutional:       General: She is not in acute distress.     Appearance: She is well-developed.   HENT:      Head: Normocephalic.   Eyes:      Pupils: Pupils are equal, round, and reactive to light.   Neck:      Thyroid: No thyromegaly.      Vascular: No JVD.   Cardiovascular:      Rate and Rhythm: Normal rate and regular rhythm.      Heart sounds: Normal heart sounds. No murmur heard.     No friction rub. No gallop.   Pulmonary:      Effort: Pulmonary effort is normal.      Breath sounds: Normal breath sounds. No wheezing or rales.   Abdominal:      General: Bowel sounds are normal. There is no distension.      Palpations: Abdomen is soft. There is no mass.      Tenderness: There is no abdominal tenderness. There is no guarding or rebound.   Musculoskeletal:      Cervical back: Neck supple.      Comments: 5/5 strength in BLE, 2+reflexes at knees and ankles bilaterally, positive SLR R side, easily walks on heels and toes    Lymphadenopathy:      Cervical: No cervical adenopathy.   Skin:     General: Skin is warm and dry.   Neurological:      Mental Status: She is alert and oriented to person,  place, and time.      Deep Tendon Reflexes: Reflexes are normal and symmetric.   Psychiatric:         Behavior: Behavior normal.         Thought Content: Thought content normal.         Judgment: Judgment normal.         Assessment:       1. Hematuria, unspecified type    2. Normal gynecologic examination    3. Screening mammogram, encounter for    4. Screening for colon cancer    5. Right lumbar radiculopathy        Plan:   Hematuria, unspecified type  -     CT Urogram Abd Pelvis W WO; Future; Expected date: 04/16/2024  -     Ambulatory referral/consult to Urology; Future; Expected date: 04/23/2024    Normal gynecologic examination  -     Ambulatory referral/consult to Gynecology; Future; Expected date: 04/23/2024    Screening mammogram, encounter for  -     Mammo Digital Screening Bilat w/ Stephen; Future; Expected date: 10/16/2024    Screening for colon cancer  -     Fecal Immunochemical Test (iFOBT); Future; Expected date: 04/16/2024    Right lumbar radiculopathy  -     Ambulatory referral/consult to Physical/Occupational Therapy; Future; Expected date: 04/23/2024  MRI for lack of improvement  Other orders  -     dicyclomine (BENTYL) 20 mg tablet; Take 1 tablet by mouth three times daily as needed for abdominal cramping  Dispense: 90 tablet; Refill: 3

## 2024-05-13 ENCOUNTER — PATIENT OUTREACH (OUTPATIENT)
Dept: ADMINISTRATIVE | Facility: HOSPITAL | Age: 44
End: 2024-05-13
Payer: COMMERCIAL

## 2024-05-13 NOTE — PROGRESS NOTES
Population Health Chart Review & Patient Outreach Details      Additional [facility] Health Notes:               Updates Requested / Reviewed:      Care Everywhere, , and Immunizations Reconciliation Completed or Queried: [facility] Topics Overdue:      [facility] Score: 2     Colon Cancer Screening  Mammogram                       Health Maintenance Topic(s) Outreach Outcomes & Actions Taken:    Primary Care Appt - Outreach Outcomes & Actions Taken  : Primary Care Appt Scheduled    Cervical Cancer Screening - Outreach Outcomes & Actions Taken  : Pap Smear/HPV Scheduled in Primary Care or OBGYN

## 2024-09-05 ENCOUNTER — PATIENT OUTREACH (OUTPATIENT)
Dept: ADMINISTRATIVE | Facility: HOSPITAL | Age: 44
End: 2024-09-05
Payer: COMMERCIAL

## 2024-09-05 NOTE — PROGRESS NOTES
Population Health Chart Review & Patient Outreach Details      Additional [facility] Health Notes:               Updates Requested / Reviewed:      Care Everywhere, , and Immunizations Reconciliation Completed or Queried: [facility] Topics Overdue:      [facility] Score: 3     Cervical Cancer Screening  Colon Cancer Screening  Mammogram                       Health Maintenance Topic(s) Outreach Outcomes & Actions Taken:    Primary Care Appt - Outreach Outcomes & Actions Taken  : Primary Care Appt Scheduled

## 2024-09-19 ENCOUNTER — TELEPHONE (OUTPATIENT)
Dept: GASTROENTEROLOGY | Facility: CLINIC | Age: 44
End: 2024-09-19
Payer: COMMERCIAL

## 2024-09-19 ENCOUNTER — OFFICE VISIT (OUTPATIENT)
Dept: URGENT CARE | Facility: CLINIC | Age: 44
End: 2024-09-19
Payer: COMMERCIAL

## 2024-09-19 VITALS
RESPIRATION RATE: 20 BRPM | TEMPERATURE: 98 F | HEIGHT: 64 IN | HEART RATE: 75 BPM | WEIGHT: 180.31 LBS | DIASTOLIC BLOOD PRESSURE: 76 MMHG | SYSTOLIC BLOOD PRESSURE: 110 MMHG | BODY MASS INDEX: 30.78 KG/M2 | OXYGEN SATURATION: 97 %

## 2024-09-19 DIAGNOSIS — R11.2 NAUSEA AND VOMITING, UNSPECIFIED VOMITING TYPE: ICD-10-CM

## 2024-09-19 DIAGNOSIS — R10.9 ABDOMINAL PAIN, UNSPECIFIED ABDOMINAL LOCATION: Primary | ICD-10-CM

## 2024-09-19 PROCEDURE — 99213 OFFICE O/P EST LOW 20 MIN: CPT | Mod: S$GLB,,, | Performed by: NURSE PRACTITIONER

## 2024-09-19 RX ORDER — LIDOCAINE HYDROCHLORIDE 20 MG/ML
10 SOLUTION OROPHARYNGEAL
Status: COMPLETED | OUTPATIENT
Start: 2024-09-19 | End: 2024-09-19

## 2024-09-19 RX ORDER — ONDANSETRON 4 MG/1
TABLET, ORALLY DISINTEGRATING ORAL
Qty: 10 TABLET | Refills: 0 | Status: SHIPPED | OUTPATIENT
Start: 2024-09-19

## 2024-09-19 RX ORDER — ALUMINUM HYDROXIDE, MAGNESIUM HYDROXIDE, AND SIMETHICONE 1200; 120; 1200 MG/30ML; MG/30ML; MG/30ML
30 SUSPENSION ORAL
Status: COMPLETED | OUTPATIENT
Start: 2024-09-19 | End: 2024-09-19

## 2024-09-19 RX ORDER — OMEPRAZOLE 20 MG/1
20 CAPSULE, DELAYED RELEASE ORAL DAILY
Qty: 30 CAPSULE | Refills: 0 | Status: SHIPPED | OUTPATIENT
Start: 2024-09-19 | End: 2024-10-03

## 2024-09-19 RX ORDER — DICYCLOMINE HYDROCHLORIDE 10 MG/5ML
20 SOLUTION ORAL
Status: COMPLETED | OUTPATIENT
Start: 2024-09-19 | End: 2024-09-19

## 2024-09-19 RX ADMIN — ALUMINUM HYDROXIDE, MAGNESIUM HYDROXIDE, AND SIMETHICONE 30 ML: 1200; 120; 1200 SUSPENSION ORAL at 07:09

## 2024-09-19 RX ADMIN — LIDOCAINE HYDROCHLORIDE 10 ML: 20 SOLUTION OROPHARYNGEAL at 07:09

## 2024-09-19 RX ADMIN — DICYCLOMINE HYDROCHLORIDE 20 MG: 10 SOLUTION ORAL at 07:09

## 2024-09-19 NOTE — TELEPHONE ENCOUNTER
----- Message from Radha Enrique NP sent at 9/19/2024  3:28 PM CDT -----  Regarding: FW: Pt called states wld like to sche an appt no longer Estab with NP  Contact: [phone]  Please call pt, help them get new appt w GI provider  ----- Message -----  From: Brenda Villanueva  Sent: 9/19/2024   3:24 PM CDT  To: [facility] Staff  Subject: Pt called states dykes like to sche an appt no#    Name of Who is Calling:CONRADO REYES [[id number]]        What is the request in detail:Pt called states dykes like to sche an appt no longer Estab with NP. Please advise         Can the clinic reply by [facility]:no        What Number to Call Back [facility]: Telephone Information:  Mobile          [phone]

## 2024-09-20 NOTE — PATIENT INSTRUCTIONS
Start Omeprazole daily for 2 weeks.  Limit trigger foods and drinks such as spicy foods or coffee.  Zofran as needed for nausea or vomiting.  Keep close follow up with GI.  Follow up with PCP as well if symptoms persist.  Go to the ER for any worsening condition or abdominal pain.

## 2024-09-20 NOTE — PROGRESS NOTES
"Subjective:      Patient ID: Venkatesh Pritchett is a 43 y.o. female.    Vitals:  height is 5' 4" (1.626 m) and weight is 81.8 kg (180 lb 5.4 oz). Her oral temperature is 98 °F (36.7 °C). Her blood pressure is 110/76 and her pulse is 75. Her respiration is 20 and oxygen saturation is 97%.     Chief Complaint: Abdominal Pain    Pt comes in with abdominal pain, vomiting, diarrhea, no energy, states this is a recurrent situation, at home tx includes bentyl gives no relief.  Hx of IBS and reported hx of GERD.  Not being treated for GERD.  Denies blood or mucous to her stool.  Denies fever.  Symptoms started after drinking coffee which is usually a trigger for her.  States when she's able to belch she feels better.  Denies NSAID use.    Abdominal Pain  This is a recurrent problem. The current episode started in the past 7 days. The onset quality is sudden. The problem occurs constantly. The problem has been unchanged. The pain is at a severity of 7/10. The pain is severe. The quality of the pain is aching and burning. The abdominal pain radiates to the right shoulder. Associated symptoms include diarrhea and vomiting. Pertinent negatives include no anorexia, arthralgias, belching, constipation, dysuria, fever, flatus, frequency, headaches, hematochezia, hematuria, melena, myalgias, nausea or weight loss. Nothing aggravates the pain. The pain is relieved by Nothing. Treatments tried: bentyl. The treatment provided no relief. There is no history of abdominal surgery, colon cancer, Crohn's disease, gallstones, GERD, irritable bowel syndrome, pancreatitis, PUD or ulcerative colitis. Patient's medical history does not include kidney stones and UTI.       Constitution: Negative for fever.   Gastrointestinal:  Positive for abdominal pain, vomiting, diarrhea and heartburn. Negative for abdominal bloating, history of abdominal surgery, nausea, constipation and bright red blood in stool.   Genitourinary:  Negative for dysuria, " frequency and hematuria.   Musculoskeletal:  Negative for joint pain and muscle ache.   Neurological:  Negative for headaches.      Objective:     Physical Exam   Constitutional: She is oriented to person, place, and time. She appears well-developed.  Non-toxic appearance. She does not appear ill. No distress.   HENT:   Head: Normocephalic and atraumatic.   Ears:   Right Ear: External ear normal.   Left Ear: External ear normal.   Nose: Nose normal.   Mouth/Throat: Mucous membranes are normal.   Eyes: Conjunctivae and lids are normal.   Neck: Trachea normal. Neck supple.   Cardiovascular: Normal rate, regular rhythm and normal heart sounds.   Pulmonary/Chest: Effort normal and breath sounds normal. No respiratory distress.   Abdominal: Normal appearance and bowel sounds are normal. She exhibits no distension and no mass. Soft. There is abdominal tenderness in the right upper quadrant, right lower quadrant and epigastric area. There is no guarding, no tenderness at McBurney's point and negative Palmer's sign.   Musculoskeletal: Normal range of motion.         General: Normal range of motion.   Neurological: She is alert and oriented to person, place, and time. She has normal strength.   Skin: Skin is warm, dry, intact, not diaphoretic and not pale.   Psychiatric: Her speech is normal and behavior is normal. Judgment and thought content normal.   Nursing note and vitals reviewed.      Assessment:     1. Abdominal pain, unspecified abdominal location    2. Nausea and vomiting, unspecified vomiting type        Plan:     Patient rechecked post GI cocktail and had relief of symptoms.  Abdominal pain, unspecified abdominal location  -     Cancel: POCT Urinalysis(Instrument)  -     LIDOcaine viscous HCl 2% oral solution 10 mL  -     dicyclomine 10 mg/5 mL syrup 20 mg  -     aluminum-magnesium hydroxide-simethicone 200-200-20 mg/5 mL suspension 30 mL  -     omeprazole (PRILOSEC) 20 MG capsule; Take 1 capsule (20 mg total) by  mouth once daily. for 14 days  Dispense: 30 capsule; Refill: 0    Nausea and vomiting, unspecified vomiting type  -     ondansetron (ZOFRAN-ODT) 4 MG TbDL; One tablet sublingual tid prn nausea  Dispense: 10 tablet; Refill: 0      Patient Instructions   Start Omeprazole daily for 2 weeks.  Limit trigger foods and drinks such as spicy foods or coffee.  Zofran as needed for nausea or vomiting.  Keep close follow up with GI.  Follow up with PCP as well if symptoms persist.  Go to the ER for any worsening condition or abdominal pain.

## 2024-11-25 ENCOUNTER — OFFICE VISIT (OUTPATIENT)
Dept: URGENT CARE | Facility: CLINIC | Age: 44
End: 2024-11-25
Payer: COMMERCIAL

## 2024-11-25 VITALS
RESPIRATION RATE: 20 BRPM | BODY MASS INDEX: 30.78 KG/M2 | HEART RATE: 105 BPM | DIASTOLIC BLOOD PRESSURE: 71 MMHG | SYSTOLIC BLOOD PRESSURE: 101 MMHG | TEMPERATURE: 99 F | OXYGEN SATURATION: 99 % | HEIGHT: 64 IN | WEIGHT: 180.31 LBS

## 2024-11-25 DIAGNOSIS — R05.9 COUGH, UNSPECIFIED TYPE: ICD-10-CM

## 2024-11-25 DIAGNOSIS — J10.1 INFLUENZA A: Primary | ICD-10-CM

## 2024-11-25 LAB
BILIRUBIN, UA POC OHS: NEGATIVE
BLOOD, UA POC OHS: ABNORMAL
CLARITY, UA POC OHS: CLEAR
COLOR, UA POC OHS: YELLOW
CTP QC/QA: YES
CTP QC/QA: YES
GLUCOSE, UA POC OHS: NEGATIVE
KETONES, UA POC OHS: NEGATIVE
LEUKOCYTES, UA POC OHS: NEGATIVE
NITRITE, UA POC OHS: NEGATIVE
PH, UA POC OHS: 5
POC MOLECULAR INFLUENZA A AGN: POSITIVE
POC MOLECULAR INFLUENZA B AGN: NEGATIVE
PROTEIN, UA POC OHS: NEGATIVE
SARS-COV-2 AG RESP QL IA.RAPID: NEGATIVE
SPECIFIC GRAVITY, UA POC OHS: >=1.03
UROBILINOGEN, UA POC OHS: 0.2

## 2024-11-25 PROCEDURE — 99213 OFFICE O/P EST LOW 20 MIN: CPT | Mod: S$GLB,,,

## 2024-11-25 PROCEDURE — 81003 URINALYSIS AUTO W/O SCOPE: CPT | Mod: QW,S$GLB,,

## 2024-11-25 PROCEDURE — 87811 SARS-COV-2 COVID19 W/OPTIC: CPT | Mod: QW,S$GLB,,

## 2024-11-25 PROCEDURE — 87502 INFLUENZA DNA AMP PROBE: CPT | Mod: QW,S$GLB,,

## 2024-11-25 NOTE — PATIENT INSTRUCTIONS
"FLU    Your Rapid FLU test was POSITIVE FOR INFLUENZA    -  Take full course of Tamilfu as prescribed.  If symptoms began over 48 hours ago, you are not eligible for Tamiflu.  Symptomatic management is recommended as treatment.    - Rest at home.     - Drink plenty of fluids so you won't get dehydrated.    - Do not share any utensils or share drinks     - Wash hands frequently    - you can take plain Mucinex (guaifenesin) 1200 mg twice a day to help loosen mucous.     Avoid taking Decongestants such as Sudafed, pseudoephedrine, phenylephrine or meds that say "cold," "sinus" or "-D".        - Fever/Pain recommendations:  Alternate Tylenol or Ibuprofen as directed for fever/pain. Avoid tylenol if you have a history of liver disease. Do not take ibuprofen if you have a history of GI bleeding, kidney disease, or if you take blood thinners.  Take ibuprofen 600-800 mg every 6-8 hours for pain and inflammation.  You can also take Tylenol/acetaminophen 650-1000 mg every 6-8 hours for added pain relief.     - Cough recommendations:  Warm tea with honey can help with cough. Honey is a natural cough suppressant.    Dextromethorphan (DM) is a cough suppressant over the counter (ie. mucinex DM, delsym).       -Sore throat recommendations: Warm fluids, warm salt water gargles, throat lozenges, tea, honey, soup, or drinking something cold or frozen.  Throat lozenges or sprays help reduce pain. Gargling with warm saltwater (1/4 teaspoon of salt in 1/2 cup of warm water) or an OTC anesthetic gargle may be useful for irritation.    - Follow up with your PCP or specialty clinic as directed in the next 1-2 weeks if not improved or as needed.  You can call [phone] to schedule an appointment with the appropriate provider.      - If your condition worsens or fails to improve we recommend that you receive another evaluation at the ER immediately or contact your PCP to discuss your concerns or return here.    When to seek medical " advice  Call your healthcare provider right away if any of these occur:  Fever that is poorly controlled with OTC fever reducing medication  New or worsening ear pain, sinus pain, or headache  Stiff neck  You can't swallow liquids or you can't open your mouth wide because of throat pain  Signs of dehydration. These include very dark urine or no urine, sunken eyes, and dizziness.  Trouble breathing or noisy breathing  Muffled voice  Rash

## 2024-11-25 NOTE — PROGRESS NOTES
"Subjective:      Patient ID: Venkatesh Pritchett is a 44 y.o. female.    Vitals:  height is 5' 4" (1.626 m) and weight is 81.8 kg (180 lb 5.4 oz). Her oral temperature is 98.9 °F (37.2 °C). Her blood pressure is 101/71 and her pulse is 105. Her respiration is 20 and oxygen saturation is 99%.     Chief Complaint: Sinus Problem    Pt comes in with dizziness, nausea, right side pain, sneezing, sinus pressure, sx started Friday, at home tx includes tylenol gives mild relief.    Provider note    45 yo F c/o bodyaches, sneezing, congestion, nausea, headache x 4 days. She took tylenol for fever 2 days ago. No sick contacts. She also c/o right sided flank pain, denies urinary symptoms.     Sinus Problem  This is a new problem. The current episode started in the past 7 days. The problem is unchanged. There has been no fever. Her pain is at a severity of 0/10. She is experiencing no pain. Associated symptoms include chills, coughing, ear pain, headaches, sinus pressure and sneezing. Pertinent negatives include no congestion, diaphoresis, hoarse voice, neck pain, shortness of breath, sore throat or swollen glands. Past treatments include acetaminophen. The treatment provided mild relief.       Constitution: Positive for chills and fever. Negative for sweating and fatigue.   HENT:  Positive for ear pain and sinus pressure. Negative for congestion, sinus pain and sore throat.    Neck: Negative for neck pain.   Respiratory:  Positive for cough. Negative for shortness of breath.    Gastrointestinal:  Positive for nausea. Negative for vomiting, constipation and diarrhea.   Musculoskeletal:  Positive for muscle ache.   Allergic/Immunologic: Positive for sneezing.   Neurological:  Positive for headaches.      Objective:     Physical Exam   Constitutional: She is oriented to person, place, and time. normal  HENT:   Head: Normocephalic and atraumatic.   Ears:   Right Ear: Tympanic membrane, external ear and ear canal normal.   Left " Ear: Tympanic membrane, external ear and ear canal normal.   Nose: Congestion present. No rhinorrhea.   Mouth/Throat: No oropharyngeal exudate or posterior oropharyngeal erythema.   Eyes: Conjunctivae are normal.   Cardiovascular: Normal rate, regular rhythm and normal heart sounds.   Pulmonary/Chest: Effort normal and breath sounds normal.   Abdominal: Normal appearance.   Musculoskeletal: Normal range of motion.         General: Normal range of motion.   Neurological: She is alert and oriented to person, place, and time.   Skin: Skin is warm and dry.     Results for orders placed or performed in visit on 11/25/24   POCT Urinalysis(Instrument)    Collection Time: 11/25/24  4:53 PM   Result Value Ref Range    Color, POC UA Yellow Yellow, Straw, Colorless    Clarity, POC UA Clear Clear    Glucose, POC UA Negative Negative    Bilirubin, POC UA Negative Negative    Ketones, POC UA Negative Negative    Spec Grav POC UA >=1.030 1.005 - 1.030    Blood, POC UA Trace-intact (A) Negative    pH, POC UA 5.0 5.0 - 8.0    Protein, POC UA Negative Negative    Urobilinogen, POC UA 0.2 <=1.0    Nitrite, POC UA Negative Negative    WBC, POC UA Negative Negative   POCT Influenza A/B MOLECULAR    Collection Time: 11/25/24  4:54 PM   Result Value Ref Range    POC Molecular Influenza A Ag Positive (A) Negative    POC Molecular Influenza B Ag Negative Negative     Acceptable Yes    SARS Coronavirus 2 Antigen, POCT Manual Read    Collection Time: 11/25/24  4:55 PM   Result Value Ref Range    SARS Coronavirus 2 Antigen Negative Negative     Acceptable Yes          Assessment:     1. Influenza A    2. Cough, unspecified type        Plan:       Influenza A    Cough, unspecified type  -     POCT Urinalysis(Instrument)  -     SARS Coronavirus 2 Antigen, POCT Manual Read  -     POCT Influenza A/B MOLECULAR             Patient Instructions   FLU    Your Rapid FLU test was POSITIVE FOR INFLUENZA    -  Take full  "course of Tamilfu as prescribed.  If symptoms began over 48 hours ago, you are not eligible for Tamiflu.  Symptomatic management is recommended as treatment.    - Rest at home.     - Drink plenty of fluids so you won't get dehydrated.    - Do not share any utensils or share drinks     - Wash hands frequently    - you can take plain Mucinex (guaifenesin) 1200 mg twice a day to help loosen mucous.     Avoid taking Decongestants such as Sudafed, pseudoephedrine, phenylephrine or meds that say "cold," "sinus" or "-D".        - Fever/Pain recommendations:  Alternate Tylenol or Ibuprofen as directed for fever/pain. Avoid tylenol if you have a history of liver disease. Do not take ibuprofen if you have a history of GI bleeding, kidney disease, or if you take blood thinners.  Take ibuprofen 600-800 mg every 6-8 hours for pain and inflammation.  You can also take Tylenol/acetaminophen 650-1000 mg every 6-8 hours for added pain relief.     - Cough recommendations:  Warm tea with honey can help with cough. Honey is a natural cough suppressant.    Dextromethorphan (DM) is a cough suppressant over the counter (ie. mucinex DM, delsym).       -Sore throat recommendations: Warm fluids, warm salt water gargles, throat lozenges, tea, honey, soup, or drinking something cold or frozen.  Throat lozenges or sprays help reduce pain. Gargling with warm saltwater (1/4 teaspoon of salt in 1/2 cup of warm water) or an OTC anesthetic gargle may be useful for irritation.    - Follow up with your PCP or specialty clinic as directed in the next 1-2 weeks if not improved or as needed.  You can call [phone] to schedule an appointment with the appropriate provider.      - If your condition worsens or fails to improve we recommend that you receive another evaluation at the ER immediately or contact your PCP to discuss your concerns or return here.    When to seek medical advice  Call your healthcare provider right away if any of these " occur:  Fever that is poorly controlled with OTC fever reducing medication  New or worsening ear pain, sinus pain, or headache  Stiff neck  You can't swallow liquids or you can't open your mouth wide because of throat pain  Signs of dehydration. These include very dark urine or no urine, sunken eyes, and dizziness.  Trouble breathing or noisy breathing  Muffled voice  Rash

## 2025-02-08 ENCOUNTER — OFFICE VISIT (OUTPATIENT)
Dept: URGENT CARE | Facility: CLINIC | Age: 45
End: 2025-02-08
Payer: COMMERCIAL

## 2025-02-08 VITALS
HEIGHT: 64 IN | BODY MASS INDEX: 30.78 KG/M2 | TEMPERATURE: 100 F | RESPIRATION RATE: 20 BRPM | WEIGHT: 180.31 LBS | OXYGEN SATURATION: 97 % | HEART RATE: 94 BPM | DIASTOLIC BLOOD PRESSURE: 76 MMHG | SYSTOLIC BLOOD PRESSURE: 105 MMHG

## 2025-02-08 DIAGNOSIS — B34.9 ACUTE VIRAL SYNDROME: Primary | ICD-10-CM

## 2025-02-08 DIAGNOSIS — J02.9 SORE THROAT: ICD-10-CM

## 2025-02-08 LAB
CTP QC/QA: YES
MOLECULAR STREP A: NEGATIVE
POC MOLECULAR INFLUENZA A AGN: NEGATIVE
POC MOLECULAR INFLUENZA B AGN: NEGATIVE
SARS CORONAVIRUS 2 ANTIGEN: NEGATIVE

## 2025-02-08 PROCEDURE — 87502 INFLUENZA DNA AMP PROBE: CPT | Mod: QW,S$GLB,, | Performed by: NURSE PRACTITIONER

## 2025-02-08 PROCEDURE — 87811 SARS-COV-2 COVID19 W/OPTIC: CPT | Mod: QW,S$GLB,, | Performed by: NURSE PRACTITIONER

## 2025-02-08 PROCEDURE — 87651 STREP A DNA AMP PROBE: CPT | Mod: QW,S$GLB,, | Performed by: NURSE PRACTITIONER

## 2025-02-08 PROCEDURE — 99213 OFFICE O/P EST LOW 20 MIN: CPT | Mod: S$GLB,,, | Performed by: NURSE PRACTITIONER

## 2025-02-08 RX ORDER — AZELASTINE 1 MG/ML
1 SPRAY, METERED NASAL 2 TIMES DAILY PRN
Qty: 30 ML | Refills: 0 | Status: SHIPPED | OUTPATIENT
Start: 2025-02-08

## 2025-02-08 RX ORDER — LORATADINE 10 MG/1
10 TABLET ORAL DAILY
Qty: 30 TABLET | Refills: 0 | Status: SHIPPED | OUTPATIENT
Start: 2025-02-08

## 2025-02-08 NOTE — PROGRESS NOTES
"Subjective:      Patient ID: Venkatesh Pritchett is a 44 y.o. female.    Vitals:  height is 5' 4" (1.626 m) and weight is 81.8 kg (180 lb 5.4 oz). Her oral temperature is 99.9 °F (37.7 °C). Her blood pressure is 105/76 and her pulse is 94. Her respiration is 20 and oxygen saturation is 97%.     Chief Complaint: Sore Throat    This is a 44 y.o. female who presents today with a chief complaint of  sore throat, nasal congestion,fatigue, body aches started 2 days ago, denies fever, or chills, denies cough, wheezing or shortness of breath, denies nausea, vomiting, diarrhea or abdominal pain, denies chest pain or dizziness positional lightheadedness, denies  trouble swallowing, denies loss of taste or smell, or any other symptoms       Sore Throat   This is a new problem. The current episode started in the past 7 days. The problem has been gradually worsening. There has been no fever. The pain is at a severity of 7/10. The pain is moderate. Associated symptoms include congestion, a hoarse voice and swollen glands. Pertinent negatives include no trouble swallowing. She has tried nothing for the symptoms. The treatment provided no relief.       HENT:  Positive for congestion, postnasal drip and sore throat. Negative for trouble swallowing.    Musculoskeletal:  Positive for muscle ache.      Objective:     Physical Exam   Constitutional: She is oriented to person, place, and time. She appears well-developed. She is cooperative.  Non-toxic appearance. She does not appear ill. No distress.   HENT:   Head: Normocephalic and atraumatic.   Ears:   Right Ear: Hearing, tympanic membrane, external ear and ear canal normal.   Left Ear: Hearing, tympanic membrane, external ear and ear canal normal.   Nose: Nose normal. No mucosal edema, rhinorrhea or nasal deformity. No epistaxis. Right sinus exhibits no maxillary sinus tenderness and no frontal sinus tenderness. Left sinus exhibits no maxillary sinus tenderness and no frontal sinus " tenderness.   Mouth/Throat: Uvula is midline, oropharynx is clear and moist and mucous membranes are normal. No trismus in the jaw. Normal dentition. No uvula swelling. No oropharyngeal exudate, posterior oropharyngeal edema, posterior oropharyngeal erythema, tonsillar abscesses or cobblestoning.   Eyes: Conjunctivae and lids are normal. No scleral icterus.   Neck: Trachea normal and phonation normal. Neck supple. No edema present. No erythema present. No neck rigidity present.   Cardiovascular: Normal rate, regular rhythm, normal heart sounds and normal pulses.   Pulmonary/Chest: Effort normal and breath sounds normal. No stridor. No respiratory distress. She has no decreased breath sounds. She has no wheezes. She has no rhonchi. She has no rales.   Abdominal: Normal appearance.   Musculoskeletal: Normal range of motion.         General: No deformity. Normal range of motion.   Lymphadenopathy:     She has no cervical adenopathy.   Neurological: She is alert and oriented to person, place, and time. She exhibits normal muscle tone. Coordination normal.   Skin: Skin is warm, dry, intact, not diaphoretic and not pale.   Psychiatric: Her speech is normal and behavior is normal. Judgment and thought content normal.   Nursing note and vitals reviewed.    Results for orders placed or performed in visit on 02/08/25   POCT Influenza A/B MOLECULAR    Collection Time: 02/08/25 10:54 AM   Result Value Ref Range    POC Molecular Influenza A Ag Negative Negative    POC Molecular Influenza B Ag Negative Negative     Acceptable Yes    SARS Coronavirus 2 Antigen, POCT Manual Read    Collection Time: 02/08/25 10:54 AM   Result Value Ref Range    SARS Coronavirus 2 Antigen Negative Negative, Presumptive Negative     Acceptable Yes    POCT Strep A, Molecular    Collection Time: 02/08/25 11:07 AM   Result Value Ref Range    Molecular Strep A, POC Negative Negative     Acceptable Yes           Patient in no acute distress.  Vitals reassuring.  Discussed results/diagnosis/plan in depth with patient in clinic. Strict precautions given to patient to monitor for worsening signs and symptoms. Advised to follow up with primary.All questions answered. Strict ER precautions given. If your symptoms worsens or fail to improve you should go to the Emergency Room. Discharge and follow-up instructions given verbally/printed. Discharge and follow-up instructions discussed with the patient who expressed understanding and willingness to comply with my recommendations.Patient voiced understanding and in agreement with current treatment plan.     Please be advised this text was dictated with Qoture software and may contain errors due to translation.     Assessment:     1. Acute viral syndrome    2. Sore throat        Plan:       Acute viral syndrome    Sore throat  -     POCT Influenza A/B MOLECULAR  -     SARS Coronavirus 2 Antigen, POCT Manual Read  -     POCT Strep A, Molecular    Other orders  -     loratadine (CLARITIN) 10 mg tablet; Take 1 tablet (10 mg total) by mouth once daily.  Dispense: 30 tablet; Refill: 0  -     azelastine (ASTELIN) 137 mcg (0.1 %) nasal spray; 1 spray (137 mcg total) by Nasal route 2 (two) times daily as needed for Rhinitis.  Dispense: 30 mL; Refill: 0                Patient Instructions   PLEASE READ YOUR DISCHARGE INSTRUCTIONS ENTIRELY AS IT CONTAINS IMPORTANT INFORMATION.      Please drink plenty of fluids.    Please get plenty of rest.    Please return here or go to the Emergency Department for any concerns or worsening of condition.    Please take an over the counter antihistamine medication (allegra/Claritin/Zyrtec) of your choice as directed.    Try an over the counter decongestant like Mucinex D or Sudafed. You buy this behind the pharmacy counter    If you do have Hypertension or palpitations, it is safe to take Coricidin HBP for relief of sinus symptoms.    If not allergic,  please take over the counter Tylenol (Acetaminophen) and/or Motrin (Ibuprofen) as directed for control of pain and/or fever.  Please follow up with your primary care doctor or specialist as needed.    Sore throat recommendations: Warm fluids, warm salt water gargles, throat lozenges, tea, honey, soup, rest, hydration.    Use over the counter flonase: one spray each nostril twice daily OR two sprays each nostril once daily.     If you  smoke, please stop smoking.      Please return or see your primary care doctor if you develop new or worsening symptoms.     Please arrange follow up with your primary medical clinic as soon as possible. You must understand that you've received an Urgent Care treatment only and that you may be released before all of your medical problems are known or treated. You, the patient, will arrange for follow up as instructed. If your symptoms worsen or fail to improve you should go to the Emergency Room.

## 2025-04-01 ENCOUNTER — OFFICE VISIT (OUTPATIENT)
Dept: URGENT CARE | Facility: CLINIC | Age: 45
End: 2025-04-01
Payer: COMMERCIAL

## 2025-04-01 VITALS
HEART RATE: 88 BPM | WEIGHT: 180 LBS | SYSTOLIC BLOOD PRESSURE: 107 MMHG | RESPIRATION RATE: 16 BRPM | HEIGHT: 64 IN | TEMPERATURE: 99 F | DIASTOLIC BLOOD PRESSURE: 75 MMHG | BODY MASS INDEX: 30.73 KG/M2 | OXYGEN SATURATION: 98 %

## 2025-04-01 DIAGNOSIS — M54.41 ACUTE RIGHT-SIDED LOW BACK PAIN WITH RIGHT-SIDED SCIATICA: ICD-10-CM

## 2025-04-01 DIAGNOSIS — G96.191 TARLOV CYST: ICD-10-CM

## 2025-04-01 DIAGNOSIS — R10.31 RIGHT LOWER QUADRANT ABDOMINAL PAIN: ICD-10-CM

## 2025-04-01 DIAGNOSIS — R10.9 RIGHT FLANK PAIN: Primary | ICD-10-CM

## 2025-04-01 DIAGNOSIS — K58.8 OTHER IRRITABLE BOWEL SYNDROME: ICD-10-CM

## 2025-04-01 PROCEDURE — 99214 OFFICE O/P EST MOD 30 MIN: CPT | Mod: 25,S$GLB,, | Performed by: PHYSICIAN ASSISTANT

## 2025-04-01 PROCEDURE — 96372 THER/PROPH/DIAG INJ SC/IM: CPT | Mod: S$GLB,,, | Performed by: PHYSICIAN ASSISTANT

## 2025-04-01 RX ORDER — ALUMINUM HYDROXIDE, MAGNESIUM HYDROXIDE, AND SIMETHICONE 1200; 120; 1200 MG/30ML; MG/30ML; MG/30ML
30 SUSPENSION ORAL
Status: COMPLETED | OUTPATIENT
Start: 2025-04-01 | End: 2025-04-01

## 2025-04-01 RX ORDER — KETOROLAC TROMETHAMINE 10 MG/1
10 TABLET, FILM COATED ORAL EVERY 6 HOURS PRN
Qty: 16 TABLET | Refills: 0 | Status: SHIPPED | OUTPATIENT
Start: 2025-04-02 | End: 2025-04-06

## 2025-04-01 RX ORDER — KETOROLAC TROMETHAMINE 30 MG/ML
30 INJECTION, SOLUTION INTRAMUSCULAR; INTRAVENOUS
Status: COMPLETED | OUTPATIENT
Start: 2025-04-01 | End: 2025-04-01

## 2025-04-01 RX ORDER — LIDOCAINE HYDROCHLORIDE 20 MG/ML
10 SOLUTION OROPHARYNGEAL ONCE
Status: COMPLETED | OUTPATIENT
Start: 2025-04-01 | End: 2025-04-01

## 2025-04-01 RX ORDER — DICYCLOMINE HYDROCHLORIDE 20 MG/1
20 TABLET ORAL
Status: COMPLETED | OUTPATIENT
Start: 2025-04-01 | End: 2025-04-01

## 2025-04-01 RX ORDER — DICYCLOMINE HYDROCHLORIDE 20 MG/1
20 TABLET ORAL EVERY 6 HOURS PRN
Qty: 120 TABLET | Refills: 0 | Status: SHIPPED | OUTPATIENT
Start: 2025-04-01 | End: 2025-05-02

## 2025-04-01 RX ADMIN — KETOROLAC TROMETHAMINE 30 MG: 30 INJECTION, SOLUTION INTRAMUSCULAR; INTRAVENOUS at 07:04

## 2025-04-01 RX ADMIN — ALUMINUM HYDROXIDE, MAGNESIUM HYDROXIDE, AND SIMETHICONE 30 ML: 1200; 120; 1200 SUSPENSION ORAL at 07:04

## 2025-04-01 RX ADMIN — DICYCLOMINE HYDROCHLORIDE 20 MG: 20 TABLET ORAL at 07:04

## 2025-04-01 RX ADMIN — LIDOCAINE HYDROCHLORIDE 10 ML: 20 SOLUTION OROPHARYNGEAL at 07:04

## 2025-04-01 NOTE — LETTER
"  April 1, 2025      [facility] [place]  [address] [address]  Phone: [phone]  Fax: [phone]       Patient: Venkatesh Pritchett   YOB: 1980  Date of Visit: 04/01/2025    To Whom It May Concern:    Sara Pritchett  was at [facility] on 04/01/2025. The patient may return to work/school on 4/2/25 with no restrictions. If you have any questions or concerns, or if I can be of further assistance, please do not hesitate to contact me.    Sincerely,          Fawnhawk Gooden PA-C (Jackie)       "

## 2025-04-02 NOTE — PATIENT INSTRUCTIONS
Discussed with patient that if he/she is in pain today, only take tylenol due to toradol injection received in clinic. You can continue NSAIDS tomorrow such as ibuprofen (Motrin/Advil), naproxen (Aleve), Mobic (Meloxicam).       Mylanta Maximum Strength Liquid Antacid/Anti-Gas  + rx for  dicyclomine 20 mg po every 6 hours prn abdominal pain  Aluminum hydroxide (equivalent to dried gel, USP) 800 mg  Magnesium hydroxide 800 mg  Simethicone 80 mg      A Tarlov cyst is a fluid-filled sac that sits on spinal nerve roots. These usually occur at the base (bottom part) of your spine. While Tarlov cysts dont always cause symptoms, you may experience pain, numbness and bladder or bowel issues. Treatment is available to relieve discomfort or remove the cyst.     Recommend Midol Menstrual Complete; has diuretic.        Please remember that you have received care at an urgent care today. Urgent cares are not emergency rooms and are not equipped to handle life threatening emergencies and cannot rule in or out certain medical conditions and you may be released before all of your medical problems are known or treated. Please arrange follow up with your primary care physician or speciality clinic  within 2-5 days if your signs and symptoms have not resolved or worsen. Patient can call our Referral Hotline at [phone] to make an appointment.  You feel very weak, like you cant stand up, and your skin is cool, clammy, or looks blue or gray.  You have severe abdominal pain.  You have chest pain or trouble breathing.  You have signs of severe fluid loss, such as:  No urine for more than 8 hours.  You feel very lightheaded or like you are going to pass out.  You feel weak like you are going to fall.  You are not able to keep any fluids down.  You develop early signs of fluid loss again, such as:  Your urine is very dark colored.  Your mouth is dry.  You have muscle cramps.  You have a lack of energy.  You feel light-headed when you  get up.

## 2025-04-02 NOTE — PROGRESS NOTES
"Subjective:      Patient ID: Venkatesh Pritchett is a 44 y.o. female.    Vitals:  height is 5' 4" (1.626 m) and weight is 81.6 kg (180 lb). Her oral temperature is 98.8 °F (37.1 °C). Her blood pressure is 107/75 and her pulse is 88. Her respiration is 16 and oxygen saturation is 98%.     Chief Complaint: Back Pain    Venkatesh Pritchett is a 44 y.o. female  with hx of IBS who complains of right back pain for about 2 to 5 months.  Pain radiates to the right hip and leg.  Patient is also  having pain on the right side of her abdomen that is recurrent; was told it was due to her IBS. She has tried dicyclomine for her pain. Patient states she is on her menstrual period; declined U/A; states her urine is normal every time she comes in for this issue.     She c/o abdominal bloating worse with menstrual period.  Patient is present with her .     Back Pain  This is a new problem. The current episode started more than 1 month ago. The problem occurs constantly. The problem has been gradually worsening since onset. The pain is present in the lumbar spine and sacro-iliac. The quality of the pain is described as aching. The pain radiates to the right thigh. The pain is at a severity of 8/10. The pain is severe. The pain is The same all the time. The symptoms are aggravated by twisting, position, lying down and standing. Stiffness is present All day. Associated symptoms include abdominal pain and leg pain. Pertinent negatives include no bladder incontinence, bowel incontinence, chest pain, dysuria, fever, headaches, numbness, paresis, paresthesias, pelvic pain, perianal numbness, tingling, weakness or weight loss. She has tried nothing for the symptoms. The treatment provided no relief.       Constitution: Negative for activity change, appetite change, chills, fatigue, fever and generalized weakness.   Cardiovascular:  Negative for chest pain, leg swelling, palpitations and sob on exertion.   Gastrointestinal:  Positive " for abdominal pain and abdominal bloating. Negative for history of abdominal surgery, nausea, vomiting, constipation, diarrhea and bowel incontinence.   Genitourinary:  Positive for flank pain. Negative for dysuria, frequency, urgency, urine decreased, bladder incontinence, hematuria, history of kidney stones and pelvic pain.   Musculoskeletal:  Positive for pain, back pain and muscle ache. Negative for trauma, joint pain, joint swelling, abnormal ROM of joint, arthritis, gout, muscle cramps and history of spine disorder.   Neurological:  Negative for headaches, numbness and tingling.      Objective:     Physical Exam   Constitutional: She is oriented to person, place, and time. She appears well-developed. She is cooperative. No distress.      Comments:Patient is awake and alert, sitting up in exam chair, speaking and answering in complete sentences     normal  HENT:   Head: Normocephalic and atraumatic.   Ears:   Right Ear: Tympanic membrane, external ear and ear canal normal.   Left Ear: Tympanic membrane, external ear and ear canal normal.   Nose: Nose normal.   Mouth/Throat: Oropharynx is clear and moist and mucous membranes are normal. Mucous membranes are moist. Oropharynx is clear.   Eyes: Conjunctivae and lids are normal. Pupils are equal, round, and reactive to light. Extraocular movement intact   Neck: Trachea normal and phonation normal. Neck supple.   Cardiovascular: Normal rate, regular rhythm, normal heart sounds and normal pulses.   Pulmonary/Chest: Effort normal and breath sounds normal.   Abdominal: Normal appearance. She exhibits no distension and no mass. There is no abdominal tenderness. There is no rebound, no guarding, no left CVA tenderness and no right CVA tenderness. No hernia.   Musculoskeletal: Normal range of motion.         General: Normal range of motion.      Thoracic back: Normal.      Lumbar back: She exhibits bony tenderness. She exhibits normal range of motion and no swelling.         Back:       Comments: Patient appears to be in mild to moderate pain, antalgic gait noted. Lumbosacral spine area reveals bony tenderness with deep palpation to right flank. Painful and reduced LS ROM noted.   Neurological: She is alert and oriented to person, place, and time. She has normal strength and normal reflexes.   Skin: Skin is warm, dry, intact and not diaphoretic.   Psychiatric: Her speech is normal and behavior is normal. Judgment and thought content normal.   Nursing note and vitals reviewed.      Assessment:     1. Right flank pain    2. Acute right-sided low back pain with right-sided sciatica    3. Right lower quadrant abdominal pain    4. Other irritable bowel syndrome    5. Tarlov cyst      Patient presents with clinical exam findings and history consistent with above.      On exam, patient is nontoxic appearing and vitals are stable.      Diagnostic testing results were reviewed and discussed with patient/guardian.   Tests ordered in clinic: Patient declined U/A    Previous progress notes/admissions/labs and medications were reviewed.  Reviewed GFR > 60 from CMP on 3/26/24.      Details    Reading Physician Reading Date Result Priority   Hay Girard MD  [phone]  3/26/2024 STAT     Narrative & Impression  EXAMINATION:  XR ABDOMEN FLAT AND ERECT     CLINICAL HISTORY:  Unspecified abdominal pain     TECHNIQUE:  Flat and erect AP views of the abdomen were performed.     COMPARISON:  None     FINDINGS:  Bowel gas pattern is not unusual.  There is no mass, organomegaly or abnormal calcification.  The bony structures are intact.     Impression:     Negative Abdomen  .        Electronically signed by:Hay Girard  Date:                                            03/26/2024  Time:                                           19:44     X-Ray Hip 2 or 3 views Right (with Pelvis when performed)    Details    Reading Physician Reading Date Result Priority   Gurpreet Choi  MD  [phone]  [phone]  2/12/2024 Routine     Narrative & Impression  EXAMINATION:  XR HIP WITH PELVIS WHEN PERFORMED, 2 OR 3  VIEWS RIGHT     CLINICAL HISTORY:  Pain in right hip     TECHNIQUE:  AP view of the pelvis and frog leg lateral view of the right hip were performed.     COMPARISON:  None     FINDINGS:  No fracture or dislocation.  No bone destruction identified.  The hip joint spaces are slightly narrowed bilaterally.     Impression:     See above        Electronically signed by:Gurpreet Choi MD  Date:                                            02/12/2024  Time:                                           14:20          US Abdomen Complete    Details    Reading Physician Reading Date Result Priority   Avelino Sherman MD  [phone]  [phone]  12/29/2021 Routine     Narrative & Impression  EXAMINATION:  US ABDOMEN COMPLETE     CLINICAL HISTORY:  Right upper quadrant pain     TECHNIQUE:  Complete abdominal ultrasound (including pancreas, aorta, liver, gallbladder, common bile duct, IVC, kidneys, and spleen) was performed.     COMPARISON:  CT abdomen pelvis 11/17/2021     FINDINGS:  Pancreas: The visualized portions of pancreas appear normal.     Aorta: No aneurysm.     Liver: 15.0 cm, normal in size. Homogeneous parenchymal echotexture. No focal lesions.     Gallbladder: No calculi, wall thickening, or pericholecystic fluid.  Negative sonographic Palmer's sign.     Biliary system: 3 mm common bile duct.  No intrahepatic ductal dilatation.     Inferior vena cava: Normal in appearance.     Right kidney: 9.9 cm. No hydronephrosis.     Left kidney: 10.6 cm. No hydronephrosis.     Spleen: 10.0 cm.  Normal in size with homogeneous echotexture.     Miscellaneous: No ascites.     Impression:     No significant abnormality.        Electronically signed by:Avelino Sherman  Date:                                            12/29/2021  Time:                                           14:28     CT Abdomen Pelvis  With Contrast  Order: [id number]   Status: Final result       Next appt: Today at 02:40 PM in Pain Medicine (CLAUDIA Valenzuela)    Test Result Released: Yes (seen)    0 Result Notes  Details    Reading Physician Reading Date Result Priority   Leonides Flaherty MD  [phone]  11/17/2021 STAT     Narrative & Impression  EXAMINATION:  CT ABDOMEN PELVIS WITH CONTRAST     CLINICAL HISTORY:  Abdominal pain, acute, nonlocalized;     TECHNIQUE:  Low dose axial images, sagittal and coronal reformations were obtained from the lung bases to the pubic symphysis following the IV administration of 75 mL of Omnipaque 350 .  Oral contrast was not administered.     COMPARISON:  02/19/2020     FINDINGS:  Abdomen:     - Lower thorax:Moderate hiatal hernia.     - Lung bases: No infiltrates and no nodules.  Mild bibasilar atelectasis.     - Liver: No focal mass.  Liver is mildly enlarged with slight elongation of the right lobe     - Gallbladder: No calcified gallstones.     - Bile Ducts: No evidence of intra or extra hepatic biliary ductal dilation.     - Spleen: Negative.     - Kidneys: No mass or hydronephrosis.     - Adrenals: Unremarkable.     - Pancreas: No mass or peripancreatic fat stranding.     - Retroperitoneum:  No significant adenopathy.     - Vascular: No abdominal aortic aneurysm.     - Abdominal wall:  Small fat containing umbilical hernia.     Pelvis:     No pelvic mass, adenopathy, or free fluid.     Urinary bladder is within normal limits.     The uterus is present.     The appendix is within normal limits.     Bowel/Mesentery:     No evidence of bowel obstruction or inflammation.     Bones:  No acute osseous abnormality and no suspicious lytic or blastic lesion.     Sacral Tarlov cyst on the right.  No significant change.     Impression:     1. No acute abnormality.  2. Mild hepatomegaly.  3. Hiatal hernia.  4. Small fat containing umbilical hernia.        Electronically signed by:Leonides Flaherty  Date:                                             11/17/2021  Time:                                           17:13            Plan:   Patient declined U/A. Patient missed GI appt with Dr. Rodriguez on 10/16/24.   Discussed CT Abdomen pelvis from 11/17/21 reveals sacral tarlov cyst on the right; discuss tarlov cyst can cause back pain with sciatica.     Patient states pain is improving at discharge.     Right flank pain  -     Cancel: POCT urine pregnancy  -     Cancel: POCT Urinalysis(Instrument)  -     ketorolac injection 30 mg  -     ketorolac (TORADOL) 10 mg tablet; Take 1 tablet (10 mg total) by mouth every 6 (six) hours as needed for Pain.  Dispense: 16 tablet; Refill: 0    Acute right-sided low back pain with right-sided sciatica  -     ketorolac injection 30 mg  -     ketorolac (TORADOL) 10 mg tablet; Take 1 tablet (10 mg total) by mouth every 6 (six) hours as needed for Pain.  Dispense: 16 tablet; Refill: 0  -     Ambulatory referral/consult to Spine Care    Right lower quadrant abdominal pain  -     aluminum-magnesium hydroxide-simethicone 200-200-20 mg/5 mL suspension 30 mL  -     LIDOcaine viscous HCl 2% oral solution 10 mL  -     dicyclomine tablet 20 mg  -     ketorolac (TORADOL) 10 mg tablet; Take 1 tablet (10 mg total) by mouth every 6 (six) hours as needed for Pain.  Dispense: 16 tablet; Refill: 0    Other irritable bowel syndrome  -     Ambulatory referral/consult to Gastroenterology  -     dicyclomine (BENTYL) 20 mg tablet; Take 1 tablet (20 mg total) by mouth every 6 (six) hours as needed (abdominal cramping).  Dispense: 120 tablet; Refill: 0    Tarlov cyst  -     Ambulatory referral/consult to Spine Care          Medical Decision Making:   Differential Diagnosis:   Patient declined U/A.    As discussed with patient; differential diagnosis include but not limited to gastroenteritis, colitis, diverticulitis, cholecystitis, appendicitis, pancreatitis, nephrolithiasis, urinary tract infection (UTI), hernia, splenic  "injury, abdominal strain, among others. Discussed with patient limitations of urgent care and that I cannot determine cause of patient's abdominal pain without appropriate imaging. At this time,vital signs and abdominal exam is not concerning for acute abdominal emergency, though patient would need imaging if pain persists or if he has new symptoms associated or worsening. Given strict ER precautions. Otherwise, close follow up with GI or PCP for further evaluation and outpatient management.            1) See orders for this visit as documented in the electronic medical record.  2) Symptomatic therapy suggested: use acetaminophen/ibuprofen every 6-8 hours prn pain or fever, push fluids.   3) Call or return to clinic prn if these symptoms worsen or fail to improve as anticipated.    Discussed results/diagnosis/plan with patient in clinic.  We had shared decision making for patient's treatment. Patient verbalized understanding and in agreement with current treatment plan.     Patient was instructed to return for re-evaluation with urgent care or PCP for continued outpatient workup and management if symptoms do not improve/worsening symptoms. Strict ED versus clinic precautions given in depth.    Discharge and follow-up instructions given verbally/printed with the patient who expressed understanding. The instructions and results are also available on [facility]              [facility] "Josephine" RELL Gooden          Patient Instructions   Discussed with patient that if he/she is in pain today, only take tylenol due to toradol injection received in clinic. You can continue NSAIDS tomorrow such as ibuprofen (Motrin/Advil), naproxen (Aleve), Mobic (Meloxicam).       Mylanta Maximum Strength Liquid Antacid/Anti-Gas  + rx for  dicyclomine 20 mg po every 6 hours prn abdominal pain  Aluminum hydroxide (equivalent to dried gel, USP) 800 mg  Magnesium hydroxide 800 mg  Simethicone 80 mg      A Tarlov cyst is a fluid-filled sac that sits " on spinal nerve roots. These usually occur at the base (bottom part) of your spine. While Tarlov cysts dont always cause symptoms, you may experience pain, numbness and bladder or bowel issues. Treatment is available to relieve discomfort or remove the cyst.     Recommend Midol Menstrual Complete; has diuretic.        Please remember that you have received care at an urgent care today. Urgent cares are not emergency rooms and are not equipped to handle life threatening emergencies and cannot rule in or out certain medical conditions and you may be released before all of your medical problems are known or treated. Please arrange follow up with your primary care physician or speciality clinic  within 2-5 days if your signs and symptoms have not resolved or worsen. Patient can call our Referral Hotline at [phone] to make an appointment.  You feel very weak, like you cant stand up, and your skin is cool, clammy, or looks blue or gray.  You have severe abdominal pain.  You have chest pain or trouble breathing.  You have signs of severe fluid loss, such as:  No urine for more than 8 hours.  You feel very lightheaded or like you are going to pass out.  You feel weak like you are going to fall.  You are not able to keep any fluids down.  You develop early signs of fluid loss again, such as:  Your urine is very dark colored.  Your mouth is dry.  You have muscle cramps.  You have a lack of energy.  You feel light-headed when you get up.

## 2025-04-04 ENCOUNTER — TELEPHONE (OUTPATIENT)
Dept: PAIN MEDICINE | Facility: CLINIC | Age: 45
End: 2025-04-04
Payer: COMMERCIAL

## 2025-04-04 NOTE — TELEPHONE ENCOUNTER
Patient would like a to see a female provider. The patient will call us back to schedule an appointment.

## 2025-04-09 ENCOUNTER — OFFICE VISIT (OUTPATIENT)
Dept: INTERNAL MEDICINE | Facility: CLINIC | Age: 45
End: 2025-04-09
Payer: COMMERCIAL

## 2025-04-09 VITALS
HEIGHT: 64 IN | OXYGEN SATURATION: 96 % | BODY MASS INDEX: 32.85 KG/M2 | DIASTOLIC BLOOD PRESSURE: 68 MMHG | HEART RATE: 85 BPM | WEIGHT: 192.44 LBS | SYSTOLIC BLOOD PRESSURE: 110 MMHG

## 2025-04-09 DIAGNOSIS — K21.9 GASTROESOPHAGEAL REFLUX DISEASE, UNSPECIFIED WHETHER ESOPHAGITIS PRESENT: ICD-10-CM

## 2025-04-09 DIAGNOSIS — Z12.11 SCREENING FOR COLON CANCER: ICD-10-CM

## 2025-04-09 DIAGNOSIS — R60.9 EDEMA, UNSPECIFIED TYPE: ICD-10-CM

## 2025-04-09 DIAGNOSIS — Z00.00 WELLNESS EXAMINATION: ICD-10-CM

## 2025-04-09 DIAGNOSIS — M48.07 SPINAL STENOSIS, LUMBOSACRAL REGION: Primary | ICD-10-CM

## 2025-04-09 PROCEDURE — 99999 PR PBB SHADOW E&M-EST. PATIENT-LVL V: CPT | Mod: PBBFAC,,, | Performed by: INTERNAL MEDICINE

## 2025-04-09 RX ORDER — PANTOPRAZOLE SODIUM 40 MG/1
40 TABLET, DELAYED RELEASE ORAL DAILY
Qty: 90 TABLET | Refills: 3 | Status: SHIPPED | OUTPATIENT
Start: 2025-04-09 | End: 2026-04-09

## 2025-04-09 RX ORDER — ALPRAZOLAM 0.25 MG/1
TABLET ORAL
Qty: 4 TABLET | Refills: 0 | Status: SHIPPED | OUTPATIENT
Start: 2025-04-09

## 2025-04-11 NOTE — PROGRESS NOTES
Subjective:       Patient ID: Venkatesh Pritchett is a 44 y.o. female.    Chief Complaint: Follow-up and Hip Pain (Right)    Follow-up  Pertinent negatives include no abdominal pain, chest pain (arm pain or jaw pain), headaches, nausea or vomiting.   Hip Pain      Pt is still having R sided back pain for about 3 years.  It goes down her R leg.  No numbness or weakness no bladder or bowel incontinence.  Has pain on the outside of both hips.  Has severe GERD and chronic abdominal bloating.    Review of Systems   Respiratory:  Negative for shortness of breath (PND or orthopnea).    Cardiovascular:  Negative for chest pain (arm pain or jaw pain).   Gastrointestinal:  Negative for abdominal pain, diarrhea, nausea and vomiting.   Genitourinary:  Negative for dysuria.   Neurological:  Negative for seizures, syncope and headaches.       Objective:      Physical Exam  Constitutional:       General: She is not in acute distress.     Appearance: She is well-developed.   HENT:      Head: Normocephalic.   Eyes:      Pupils: Pupils are equal, round, and reactive to light.   Neck:      Thyroid: No thyromegaly.      Vascular: No JVD.   Cardiovascular:      Rate and Rhythm: Normal rate and regular rhythm.      Heart sounds: Normal heart sounds. No murmur heard.     No friction rub. No gallop.   Pulmonary:      Effort: Pulmonary effort is normal.      Breath sounds: Normal breath sounds. No wheezing or rales.   Abdominal:      General: Bowel sounds are normal. There is no distension.      Palpations: Abdomen is soft. There is no mass.      Tenderness: There is no abdominal tenderness. There is no guarding or rebound.   Musculoskeletal:      Cervical back: Neck supple.      Comments: Tender over both trochanteric bursa    5/5 strength in BLE, 2+reflexes at knees and ankles bilaterally, neg SLR bilaterally, easily walks on heels and toes    Lymphadenopathy:      Cervical: No cervical adenopathy.   Skin:     General: Skin is warm and  dry.   Neurological:      Mental Status: She is alert and oriented to person, place, and time.      Deep Tendon Reflexes: Reflexes are normal and symmetric.   Psychiatric:         Behavior: Behavior normal.         Thought Content: Thought content normal.         Judgment: Judgment normal.       R trochanteric bursa was injected under sterile conditions (prepped with iodine) with 1ml kenalog(40mg) and 1ml 1% Lidocaine without epi, there was no blood loss and pt tolerated procedure well.   Assessment:       1. Spinal stenosis, lumbosacral region    2. Wellness examination    3. Screening for colon cancer    4. Gastroesophageal reflux disease, unspecified whether esophagitis present    5. Edema, unspecified type        Plan:   Spinal stenosis, lumbosacral region  -     MRI Lumbar Spine Without Contrast; Future; Expected date: 04/09/2025  -     ALPRAZolam (XANAX) 0.25 MG tablet; Take One-two tablets one hour prior to MRI then may repeat immediately prior to MRI  Dispense: 4 tablet; Refill: 0    Wellness examination  -     CBC Auto Differential; Future; Expected date: 04/09/2025  -     Comprehensive Metabolic Panel; Future; Expected date: 04/09/2025  -     Lipid Panel; Future; Expected date: 04/09/2025  -     TSH; Future; Expected date: 04/09/2025  -     Hemoglobin A1C; Future; Expected date: 04/09/2025  -     Vitamin D; Future; Expected date: 04/09/2025  -     Sedimentation rate; Future; Expected date: 04/09/2025  -     C-Reactive Protein; Future; Expected date: 04/09/2025  -     Amylase; Future; Expected date: 04/09/2025  -     Lipase; Future; Expected date: 04/09/2025    Screening for colon cancer  -     Cologuard Screening (Multitarget Stool DNA); Future; Expected date: 04/09/2025    Gastroesophageal reflux disease, unspecified whether esophagitis present  -     Ambulatory referral/consult to Endo Procedure ; Future; Expected date: 04/10/2025    Edema, unspecified type  -     US Lower Extremity Veins  Bilateral; Future; Expected date: 04/09/2025    Other orders  -     pantoprazole (PROTONIX) 40 MG tablet; Take 1 tablet (40 mg total) by mouth once daily.  Dispense: 90 tablet; Refill: 3

## 2025-04-19 ENCOUNTER — NURSE TRIAGE (OUTPATIENT)
Dept: ADMINISTRATIVE | Facility: CLINIC | Age: 45
End: 2025-04-19
Payer: COMMERCIAL

## 2025-04-19 NOTE — TELEPHONE ENCOUNTER
C/O back pain,this started this am,  it also radiates to Right leg. This is a chronic pain, but worse today. Saw Dr. Carrion on 4/9 for spinal stenosis. Spouse is not with patient, triage done per his account.Triage done- dispo see provider in 4 hours, worsening ED.  He was calling to see about appointment with spinal provider. I can see appointment on 4/17 with ortho, canceled. I can see appointment with Phys Med, no show. Spouse advised. Asked to reschedule, advised I could not but I can send message to them to call her to reschedule. Verb understanding.   Reason for Disposition   [1] SEVERE back pain (e.g., excruciating, unable to do any normal activities) AND [2] not improved 2 hours after pain medicine    Additional Information   Negative: Passed out (e.g., fainted, lost consciousness, blacked out and was not responding)   Negative: Shock suspected (e.g., cold/pale/clammy skin, too weak to stand, low BP, rapid pulse)   Negative: Sounds like a life-threatening emergency to the triager   Negative: [1] SEVERE back pain (e.g., excruciating) AND [2] sudden onset AND [3] age > 60 years   Negative: [1] Unable to urinate (or only a few drops) > 4 hours AND [2] bladder feels very full (e.g., palpable bladder or strong urge to urinate)   Negative: [1] Loss of bladder or bowel control (urine or bowel incontinence; wetting self, leaking stool) AND [2] new-onset   Negative: Numbness in groin or rectal area (i.e., loss of sensation)   Negative: [1] SEVERE abdominal pain AND [2] present > 1 hour   Negative: [1] Abdominal pain AND [2] age > 60 years   Negative: Weakness of a leg or foot (e.g., unable to bear weight, dragging foot)   Negative: Unable to walk   Negative: Patient sounds very sick or weak to the triager    Protocols used: Back Pain-A-AH

## 2025-04-21 ENCOUNTER — TELEPHONE (OUTPATIENT)
Dept: ORTHOPEDICS | Facility: CLINIC | Age: 45
End: 2025-04-21
Payer: COMMERCIAL

## 2025-04-21 ENCOUNTER — TELEPHONE (OUTPATIENT)
Dept: PHYSICAL MEDICINE AND REHAB | Facility: CLINIC | Age: 45
End: 2025-04-21
Payer: COMMERCIAL

## 2025-04-24 ENCOUNTER — OFFICE VISIT (OUTPATIENT)
Dept: SPINE | Facility: CLINIC | Age: 45
End: 2025-04-24
Payer: COMMERCIAL

## 2025-04-24 VITALS — HEIGHT: 64 IN | BODY MASS INDEX: 31.92 KG/M2 | WEIGHT: 187 LBS

## 2025-04-24 DIAGNOSIS — M51.369 DEGENERATION OF INTERVERTEBRAL DISC OF LUMBAR REGION, UNSPECIFIED WHETHER PAIN PRESENT: Primary | ICD-10-CM

## 2025-04-24 PROCEDURE — 99204 OFFICE O/P NEW MOD 45 MIN: CPT | Mod: S$GLB,,, | Performed by: ORTHOPAEDIC SURGERY

## 2025-04-24 PROCEDURE — 3008F BODY MASS INDEX DOCD: CPT | Mod: CPTII,S$GLB,, | Performed by: ORTHOPAEDIC SURGERY

## 2025-04-24 PROCEDURE — 99999 PR PBB SHADOW E&M-EST. PATIENT-LVL III: CPT | Mod: PBBFAC,,, | Performed by: ORTHOPAEDIC SURGERY

## 2025-04-24 PROCEDURE — 1159F MED LIST DOCD IN RCRD: CPT | Mod: CPTII,S$GLB,, | Performed by: ORTHOPAEDIC SURGERY

## 2025-04-24 RX ORDER — MELOXICAM 15 MG/1
15 TABLET ORAL DAILY
Qty: 30 TABLET | Refills: 0 | Status: SHIPPED | OUTPATIENT
Start: 2025-04-24

## 2025-04-24 RX ORDER — METHOCARBAMOL 750 MG/1
750 TABLET, FILM COATED ORAL NIGHTLY PRN
Qty: 30 TABLET | Refills: 0 | Status: SHIPPED | OUTPATIENT
Start: 2025-04-24 | End: 2025-05-25

## 2025-04-24 NOTE — PROGRESS NOTES
DATE: 4/24/2025  PATIENT: Venkatesh Pritchett    Supervising Physician: Rufino Loco M.D.    CHIEF COMPLAINT: low back and right leg pain    HISTORY:  Venkatesh Pritchett is a 44 y.o. female here for initial evaluation of low back and right leg pain (Back - 7, Leg - 7).  The pain in the lower back is what bothers her most.  The pain has been present for 6 months without injury. The patient describes the pain as aching and radiating down the side of her right leg.  The pain is worse with walking and improved by rest. There is negative associated numbness and tingling. There is negative subjective weakness. Prior treatments have included no PT, ESIs, surgery.    The patient denies myelopathic symptoms such as handwriting changes or difficulty with buttons/coins/keys. Denies perineal paresthesias, bowel/bladder dysfunction.    PAST MEDICAL/SURGICAL HISTORY:  Past Medical History:   Diagnosis Date    IBS (irritable bowel syndrome)      Past Surgical History:   Procedure Laterality Date    DILATION AND CURETTAGE OF UTERUS  2000    [facility]       Medications:   Medications Ordered Prior to Encounter[1]    Social History: Social History[2]    REVIEW OF SYSTEMS:  Constitution: Negative. Negative for chills, fever and night sweats.   Cardiovascular: Negative for chest pain and syncope.   Respiratory: Negative for cough and shortness of breath.   Gastrointestinal: See HPI. Negative for nausea/vomiting. Negative for abdominal pain.  Genitourinary: See HPI. Negative for discoloration or dysuria.  Skin: Negative for dry skin, itching and rash.   Hematologic/Lymphatic: Negative for bleeding problem. Does not bruise/bleed easily.   Musculoskeletal: Negative for falls and muscle weakness.   Neurological: See HPI. No seizures.   Endocrine: Negative for polydipsia, polyphagia and polyuria.   Allergic/Immunologic: Negative for hives and persistent infections.     EXAM:  LMP 03/31/2025     General: The patient is a very pleasant 44 y.o.  Last visit 11/7/19  Next visit   5/12/20    Last labs   AST/SGOT (Units/L)   Date Value   11/10/2019 21     ALT/SGPT (Units/L)   Date Value   11/10/2019 40     Creatinine (mg/dL)   Date Value   11/10/2019 0.78     CE checked    female in no apparent distress, the patient is oriented to person, place and time.  Psych: Normal mood and affect  HEENT: Vision grossly intact, hearing intact to the spoken word.  Lungs: Respirations unlabored.  Gait: Normal station and gait, no difficulty with toe or heel walk.   Skin: Dorsal lumbar skin negative for rashes, lesions, hairy patches and surgical scars. There is negative lumbar tenderness to palpation.  Range of motion: Lumbar range of motion is acceptable.  Spinal Balance: Global saggital and coronal spinal balance acceptable, not significant for scoliosis and kyphosis.  Musculoskeletal: No pain with the range of motion of the bilateral hips. No trochanteric tenderness to palpation.  Vascular: Bilateral lower extremities warm and well perfused, dorsalis pedis pulses 2+ bilaterally.  Neurological: Normal strength and tone in all major motor groups in the bilateral lower extremities. Normal sensation to light touch in the L2-S1 dermatomes bilaterally.  Deep tendon reflexes symmetric 2+ in the bilateral lower extremities.  Negative Babinski bilaterally. Straight leg raise negative bilaterally.         There is no height or weight on file to calculate BMI.    Hemoglobin A1C   Date Value Ref Range Status   03/13/2024 5.2 4.0 - 5.6 % Final     Comment:     ADA Screening Guidelines:  5.7-6.4%  Consistent with prediabetes  >or=6.5%  Consistent with diabetes    High levels of fetal hemoglobin interfere with the HbA1C  assay. Heterozygous hemoglobin variants (HbS, HgC, etc)do  not significantly interfere with this assay.   However, presence of multiple variants may affect accuracy.     12/29/2021 5.3 4.0 - 5.6 % Final     Comment:     ADA Screening Guidelines:  5.7-6.4%  Consistent with prediabetes  >or=6.5%  Consistent with diabetes    High levels of fetal hemoglobin interfere with the HbA1C  assay. Heterozygous hemoglobin variants (HbS, HgC, etc)do  not significantly interfere with this assay.   However,  presence of multiple variants may affect accuracy.     01/04/2019 5.0 4.0 - 5.6 % Final     Comment:     ADA Screening Guidelines:  5.7-6.4%  Consistent with prediabetes  >or=6.5%  Consistent with diabetes  High levels of fetal hemoglobin interfere with the HbA1C  assay. Heterozygous hemoglobin variants (HbS, HgC, etc)do  not significantly interfere with this assay.   However, presence of multiple variants may affect accuracy.             ASSESSMENT/PLAN:    There are no diagnoses linked to this encounter.    Today we discussed at length all of the different treatment options including anti-inflammatories, acetaminophen, rest, ice, heat, physical therapy including strengthening and stretching exercises, home exercises, ROM, aerobic conditioning, aqua therapy, other modalities including ultrasound, massage, and dry needling, epidural steroid injections and finally surgical intervention.      Pt presents with chronic low back pain and radiculopathy. Will send PT orders to [facility] and mobic and robaxin to pharmacy. Pt will fu if pain persists.       [1]   Current Outpatient Medications on File Prior to Visit   Medication Sig Dispense Refill    ALPRAZolam (XANAX) 0.25 MG tablet Take One-two tablets one hour prior to MRI then may repeat immediately prior to MRI 4 tablet 0    aluminum & magnesium hydroxide-simethicone (MYLANTA MAX STRENGTH) 400-400-40 mg/5 mL suspension Take 10 mLs by mouth every 6 (six) hours as needed for Indigestion. (Patient not taking: Reported on 2/8/2025) 335 mL 0    azelastine (ASTELIN) 137 mcg (0.1 %) nasal spray 1 spray (137 mcg total) by Nasal route 2 (two) times daily as needed for Rhinitis. (Patient not taking: Reported on 4/9/2025) 30 mL 0    dicyclomine (BENTYL) 20 mg tablet Take 1 tablet by mouth three times daily as needed for abdominal cramping 90 tablet 3    dicyclomine (BENTYL) 20 mg tablet Take 1 tablet (20 mg total) by mouth every 6 (six) hours as needed (abdominal cramping). 120  tablet 0    loratadine (CLARITIN) 10 mg tablet Take 1 tablet (10 mg total) by mouth once daily. (Patient not taking: Reported on 4/9/2025) 30 tablet 0    ondansetron (ZOFRAN-ODT) 4 MG TbDL One tablet sublingual tid prn nausea (Patient not taking: Reported on 4/9/2025) 10 tablet 0    pantoprazole (PROTONIX) 40 MG tablet Take 1 tablet (40 mg total) by mouth once daily. 90 tablet 3     No current facility-administered medications on file prior to visit.   [2]   Social History  Socioeconomic History    Marital status:    Tobacco Use    Smoking status: Never    Smokeless tobacco: Never   Substance and Sexual Activity    Alcohol use: No    Drug use: No    Sexual activity: Yes     Partners: Male     Birth control/protection: None     Comment:  since 2004

## 2025-05-01 DIAGNOSIS — Z12.31 OTHER SCREENING MAMMOGRAM: ICD-10-CM

## 2025-05-19 ENCOUNTER — TELEPHONE (OUTPATIENT)
Dept: ENDOSCOPY | Facility: HOSPITAL | Age: 45
End: 2025-05-19

## 2025-05-19 NOTE — TELEPHONE ENCOUNTER
.Contacted the patient to schedule an endoscopy procedure(s) Upper Endoscopy (EGD) . The patient did not answer the call and left a voice message requesting a call back.

## 2025-05-22 ENCOUNTER — TELEPHONE (OUTPATIENT)
Dept: ENDOSCOPY | Facility: HOSPITAL | Age: 45
End: 2025-05-22

## 2025-05-22 ENCOUNTER — CLINICAL SUPPORT (OUTPATIENT)
Dept: ENDOSCOPY | Facility: HOSPITAL | Age: 45
End: 2025-05-22
Attending: INTERNAL MEDICINE
Payer: COMMERCIAL

## 2025-05-22 DIAGNOSIS — K21.9 GASTROESOPHAGEAL REFLUX DISEASE, UNSPECIFIED WHETHER ESOPHAGITIS PRESENT: ICD-10-CM

## 2025-05-27 ENCOUNTER — HOSPITAL ENCOUNTER (OUTPATIENT)
Dept: RADIOLOGY | Facility: HOSPITAL | Age: 45
Discharge: HOME | End: 2025-05-27
Attending: INTERNAL MEDICINE
Payer: COMMERCIAL

## 2025-05-27 ENCOUNTER — TELEPHONE (OUTPATIENT)
Dept: INTERNAL MEDICINE | Facility: CLINIC | Age: 45
End: 2025-05-27
Payer: COMMERCIAL

## 2025-05-27 DIAGNOSIS — M54.40 LOW BACK PAIN WITH SCIATICA, SCIATICA LATERALITY UNSPECIFIED, UNSPECIFIED BACK PAIN LATERALITY, UNSPECIFIED CHRONICITY: Primary | ICD-10-CM

## 2025-05-27 DIAGNOSIS — R60.9 EDEMA, UNSPECIFIED TYPE: ICD-10-CM

## 2025-05-27 PROCEDURE — 93970 EXTREMITY STUDY: CPT | Mod: TC

## 2025-05-27 PROCEDURE — 93970 EXTREMITY STUDY: CPT | Mod: 26,,, | Performed by: RADIOLOGY

## 2025-05-28 ENCOUNTER — RESULTS FOLLOW-UP (OUTPATIENT)
Dept: INTERNAL MEDICINE | Facility: CLINIC | Age: 45
End: 2025-05-28

## 2025-05-28 ENCOUNTER — CLINICAL SUPPORT (OUTPATIENT)
Dept: REHABILITATION | Facility: HOSPITAL | Age: 45
End: 2025-05-28
Payer: COMMERCIAL

## 2025-05-28 DIAGNOSIS — R29.3 POSTURAL IMBALANCE: ICD-10-CM

## 2025-05-28 DIAGNOSIS — M54.40 LOW BACK PAIN WITH SCIATICA, SCIATICA LATERALITY UNSPECIFIED, UNSPECIFIED BACK PAIN LATERALITY, UNSPECIFIED CHRONICITY: Primary | ICD-10-CM

## 2025-05-28 DIAGNOSIS — R68.89 DECREASED STRENGTH, ENDURANCE, AND MOBILITY: ICD-10-CM

## 2025-05-28 DIAGNOSIS — M53.86 DECREASED ROM OF LUMBAR SPINE: ICD-10-CM

## 2025-05-28 DIAGNOSIS — R53.1 DECREASED STRENGTH, ENDURANCE, AND MOBILITY: ICD-10-CM

## 2025-05-28 DIAGNOSIS — Z74.09 DECREASED STRENGTH, ENDURANCE, AND MOBILITY: ICD-10-CM

## 2025-05-28 PROCEDURE — 97530 THERAPEUTIC ACTIVITIES: CPT | Mod: PN

## 2025-05-28 PROCEDURE — 97162 PT EVAL MOD COMPLEX 30 MIN: CPT | Mod: PN

## 2025-05-29 ENCOUNTER — TELEPHONE (OUTPATIENT)
Dept: FAMILY MEDICINE | Facility: HOSPITAL | Age: 45
End: 2025-05-29
Payer: COMMERCIAL

## 2025-05-29 NOTE — PROGRESS NOTES
Outpatient Rehab    Physical Therapy Evaluation    Patient Name: Venkatesh Pritchett  MRN: [id number]  YOB: 1980  Encounter Date: 5/28/2025    Therapy Diagnosis:   Encounter Diagnoses   Name Primary?    Low back pain with sciatica, sciatica laterality unspecified, unspecified back pain laterality, unspecified chronicity Yes    Decreased strength, endurance, and mobility     Decreased ROM of lumbar spine     Postural imbalance      Physician: Darlyn Carrion MD    Physician Orders: Eval and Treat  Medical Diagnosis: Low back pain with sciatica, sciatica laterality unspecified, unspecified back pain laterality, unspecified chronicity    Visit # / Visits Authorized:  1 / 1  Insurance Authorization Period: 5/27/2025 to 5/27/2026  Date of Evaluation: 5/28/2025  Plan of Care Certification: 5/28/2025 to 8/20/2025     Time In: 1408   Time Out: 1507  Total Time (in minutes): 59   Total Billable Time (in minutes): 59    Intake Outcome Measure for FOTO Survey    Therapist reviewed FOTO scores for Venkatesh Pritchett on 5/28/2025.   FOTO report - see Media section or FOTO account episode details.     Intake Score: 55 (DC 68)%    Precautions:       Subjective   History of Present Illness  Venkatesh is a 44 y.o. female who reports to physical therapy with a chief concern of low back pain.     The patient reports a medical diagnosis of M54.40 (ICD-10-CM) - Low back pain with sciatica, sciatica laterality unspecified, unspecified back pain laterality, unspecified chronicity.    Diagnostic tests related to this condition: None.        History of Present Condition/Illness: She has some back and Right sided leg pain for over 6 months. Sometimes the pain will go up to her Right shoulder but it is mostly it is lower back pain and down the leg. The pain goes all the way down her Right leg into her foot. She fell a long time ago and had an x-ray and everything was okay. Then she fell another time and fell on her Right  side. This happened about 2 years ago. She denies any numbness or tingling. She denies any changes in bowel/bladder. She has gained some weight because she is not moving like before. She feels the pain when she is standing and walking. There are certain sitting positions that she needs to sit to feel better. At home when she is sitting on the couch she has to lean back. The car seat she has to keep it close to her back. She denies increase in the radicular symptoms when she is sitting and it is more centralized. She denies any instances of her leg giving out on her when she is walking. There is no numbness or tingling but rather just a shooting pain.     Activities of Daily Living  Social history was obtained from Patient.    General Prior Level of Function Comments: No difficulty with squatting or stairs  General Current Level of Function Comments: Severe difficutly with standing/walking and stairs       Previously independent with activities of daily living? Yes     Currently independent with activities of daily living? Yes          Previously independent with instrumental activities of daily living? Yes     Currently independent with instrumental activities of daily living? Yes              Pain     Patient reports a current pain level of 6/10. Pain at best is reported as 1/10. Pain at worst is reported as 8/10.   Location: R sided low back pain and down R leg  Clinical Progression (since onset): Worsening  Pain Qualities: Aching, Burning, Radiating  Pain-Relieving Factors: Change in position, Lying down, Rest, Activity modification, Movement, Stretching, Sitting, Walking  Pain-Aggravating Factors: Bending, Sitting, Standing, Sleeping, Driving, Cooking, Lifting         Living Arrangements  Living Situation  Housing: Home independently  Living Arrangements: Spouse/significant other, Friends  Support Systems: Family members    Home Setup  Type of Structure: House  Home Access: Level entry  Number of Levels in Home:  Two level        Employment  Employment Status: Employed part-time   20 hours with mostly standing and walking      Past Medical History/Physical Systems Review:   Venkatesh Pritchett  has a past medical history of IBS (irritable bowel syndrome).    Venkatesh Pritchett  has a past surgical history that includes Dilation and curettage of uterus (2000).    Venkatesh has a current medication list which includes the following prescription(s): alprazolam, aluminum & magnesium hydroxide-simethicone, azelastine, dicyclomine, loratadine, meloxicam, ondansetron, and pantoprazole.    Review of patient's allergies indicates:  No Known Allergies     Objective   Posture          Right pelvis characteristics: Lateral Shift  Left pelvis characteristics: Anterior Superior Iliac Spine Higher  Right Leg True Length Discrepancy (cm): 79  Left Leg True Length Discrepancy (cm): 79   Discrepancy is Anatomical and Functional.       Right Lower Extremity Reflexes  Patellar, L4: Trace (1+)         Achilles, S1: Trace (1+)         Left Lower Extremity Reflexes  Patellar, L4: Trace (1+)          Achilles, S1: Trace (1+)             Spinal Mobility  Hypermobile: Lumbosacral           Lumbar Range of Motion   Active (deg) Passive (deg) Pain   Flexion  (to mid shin)       Extension  (min limitation)       Right Lateral Flexion  (to mid thigh with pain on L)   Yes   Right Rotation         Left Lateral Flexion  (to mid thigh with pain on L)   Yes   Left Rotation                             Hip Strength - Planes of Motion   Right Strength Right Pain Left Strength Left  Pain   Flexion (L2) 4+   4+     Extension           ABduction 4- Yes (pain in R hip and groin) 4 Yes   ADduction           Internal Rotation 4   4+ Yes   External Rotation 4   4         Knee Strength   Right Strength Right Pain Left Strength Left  Pain   Flexion (S2) 4+   5     Prone Flexion           Extension (L3) 5   5            Ankle/Foot Strength - Planes of Motion   Right  "Strength Right Pain Left Strength Left  Pain   Dorsiflexion (L4) 4+   4+     Plantar Flexion (S1) 5   5     Inversion           Eversion           Great Toe Flexion           Great Toe Extension (L5) 4   4+     Lesser Toes Flexion           Lesser Toes Extension                       Cervical/Thoracic Special Tests  Thoracic Tests  Positive: Slump         Lumbar/Pelvic Girdle Special Tests  Lumbar Tests - Repeated  Positive: Flexion and Extension  increase in back with repeated extension; decrease in pain with repeated flexion    Lumbar Tests - SLR and Tension  Negative: Right Passive Straight Leg Raise                 Pelvic Girdle / Sacrum Tests  Positive: Right PRITESH  Negative: Left PRITESH, Right FADIR, Left FADIR, Right Gapping, Left Gapping, Right Gaenslen's, Left Gaenslen's, Right Sacral Spring, Left Sacral Spring, Right Sacroiliac Compression, and Left Sacroiliac Compression  Positive: Right Thigh Thrust/Posterior Shear  Negative: Left Thigh Thrust/Posterior Shear         Hip Special Tests  Intra-Articular/Impingement Tests  Positive: Right PRITESH  Negative: Left PRITESH, Right FADIR, and Left FADIR  Sacroiliac Joint Tests  Positive: Right Thigh Thrust/Posterior Shear  Negative: Right Gaenslen's, Left Gaenslen's, Right Compression, Left Compression, and Left Thigh Thrust/Posterior Shear              Treatment:  Therapeutic Activity  TA 1: MET for R anterior innominate 5"x5  TA 2: PPT 5"x10  TA 3: bridges 5"x10  TA 4: clams x 10 ea    Time Entry(in minutes):  PT Evaluation (Moderate) Time Entry: 45  Therapeutic Activity Time Entry: 15    Assessment & Plan   Assessment  Venkatesh presents with a condition of Moderate complexity.   Presentation of Symptoms: Stable  Will Comorbidities Impact Care: No       Functional Limitations: Activity tolerance, Completing work/school activities, Pain with ADLs/IADLs, Sitting tolerance, Disrupted sleep pattern, Range of motion, Performing household chores, Participating in leisure " activities, Painful locomotion/ambulation, Squatting, Standing tolerance, Functional mobility  Impairments: Activity intolerance, Abnormal or restricted range of motion, Impaired physical strength, Lack of appropriate home exercise program, Pain with functional activity    Patient Goal for Therapy (PT): To decrease pain and be able to stand without pain  Prognosis: Fair  Assessment Details: Patient is a 44 y.o. female with medical diagnosis of Low back pain with sciatica, sciatica laterality and unspecified, unspecified back pain laterality, unspecified chronicity. She arrived to today's physical therapy initial evaluation with moderate reports of Right sided low pain that radiated down her right lower extremity. PT was able to reproduce radicular symptoms with slump but not with passive straight leg raise indicating possible adverse neural tension. She displayed increase in pain with repeated extension rather than flexion indicating that she may benefit from a flexion based exercise program. Patient also presented with Right anterior innominate that was correct with muscle energy technique. She would benefit from skilled physical therapy to address mobility deficits as well as strength deficits to improve overall function and decrease pain. Patient is leaving the country to return to [place] for one month and therefore will not return to therapy until after her trip.     Plan  From a physical therapy perspective, the patient would benefit from: Skilled Rehab Services    Planned therapy interventions include: Therapeutic exercise, Therapeutic activities, Neuromuscular re-education, Manual therapy, ADLs/IADLs, Other (Comment), and Gait training. Dry Needling (prn)  Planned modalities to include: Biofeedback, Electrical stimulation - attended, Electrical stimulation - passive/unattended, Thermotherapy (hot pack), and Cryotherapy (cold pack).        Visit Frequency: 2 times Per Week for 8 Weeks.       This plan was  discussed with Patient.   Discussion participants: Agreed Upon Plan of Care  Plan details: Frequency and duration of treatment to be adjusted as needed          The patient's spiritual, cultural, and educational needs were considered, and the patient is agreeable to the plan of care and goals.           Goals:   Active       long term goals        Patient will improve FOTO score to 68% to improve self perceived ability to perform functional tasks         Start:  05/28/25    Expected End:  08/20/25            Patient will be able to stand for 30 minutes or greater to improve her ability to perform household tasks        Start:  05/28/25    Expected End:  08/20/25            Patient will be able to walk 1 mile with little to no difficulty to improve ability to perform functional tasks        Start:  05/28/25    Expected End:  08/20/25            Patient will display negative slump test to indicate improvement in adverse neural tension        Start:  05/28/25    Expected End:  08/20/25               short term goals        patient will be independent with Home exercise program to supplement therapy        Start:  05/28/25    Expected End:  07/09/25            patient will improve hip strength to 4+/5 to improve ability to perform functional tasks        Start:  05/28/25    Expected End:  07/09/25            patient will be able to perform full lumbar range of motion without pain to improve ability to perform functional tasks        Start:  05/28/25    Expected End:  07/09/25            patient will be able to navigate stairs with little to no difficulty to improve ability to navigate her home environment        Start:  05/28/25    Expected End:  07/09/25                Carolin Gomez, PT ,DPT,OCS  05/28/2025

## 2025-06-18 ENCOUNTER — TELEPHONE (OUTPATIENT)
Dept: ENDOSCOPY | Facility: HOSPITAL | Age: 45
End: 2025-06-18
Payer: COMMERCIAL

## 2025-06-23 NOTE — TELEPHONE ENCOUNTER
----- Message from Cathie Lowry sent at 1/29/2020 12:32 PM CST -----  Contact: Pt  Cresencio  Pt  is requesting an appt as soon as possible   Pt is experiencing heartburn with abdominal pain     Pt  Cresencio can be reached at [phone]  
I spoke with  to scheduled patient a same day appointment   
Walk in

## 2025-07-10 ENCOUNTER — TELEPHONE (OUTPATIENT)
Dept: ENDOSCOPY | Facility: HOSPITAL | Age: 45
End: 2025-07-10
Payer: COMMERCIAL

## 2025-07-24 ENCOUNTER — PATIENT MESSAGE (OUTPATIENT)
Dept: GASTROENTEROLOGY | Facility: CLINIC | Age: 45
End: 2025-07-24
Payer: COMMERCIAL

## 2025-07-31 ENCOUNTER — TELEPHONE (OUTPATIENT)
Dept: GASTROENTEROLOGY | Facility: CLINIC | Age: 45
End: 2025-07-31
Payer: COMMERCIAL

## 2025-07-31 NOTE — TELEPHONE ENCOUNTER
Tried to contact pt to schedule her colonoscopy, no answer.  Left detail message for pt to contact clinic to schedule her colonoscopy.  [phone]

## 2025-08-05 ENCOUNTER — TELEPHONE (OUTPATIENT)
Dept: GASTROENTEROLOGY | Facility: CLINIC | Age: 45
End: 2025-08-05
Payer: COMMERCIAL

## 2025-08-05 NOTE — TELEPHONE ENCOUNTER
Tried to contact pt to schedule her colonoscopy, no answer.  Left detail message for pt to contact clinic to schedule her colonoscopy.  [phone]

## 2025-08-08 ENCOUNTER — TELEPHONE (OUTPATIENT)
Dept: ENDOSCOPY | Facility: HOSPITAL | Age: 45
End: 2025-08-08
Payer: COMMERCIAL

## 2025-08-08 NOTE — TELEPHONE ENCOUNTER
Endoscopy Scheduling Department  [facility] [address] [place] [address]  Take the [facility] Elevators to 4th Floor Endoscopy Lab      Date: 08/08/2025      Medical Record # [id number]        Dear  Venkatesh Pritchett      An order for the following procedure(s) Upper Endoscopy (EGD) was placed for you by   Darlyn Carrion MD.    Since multiple attempts have been made to get in touch with you, this is the last notification. Please call the scheduling nurse to schedule this procedure as soon as possible.    If you have already scheduled this appointment, please disregard this letter. If you would like to cancel this request, please call the number listed below.     Sincerely,        Endoscopy Scheduling Department [phone]        Comments: Office hours are Monday through Friday 8-430p.

## 2025-08-08 NOTE — TELEPHONE ENCOUNTER
Contacted the patient to schedule an endoscopy procedure(s) Upper Endoscopy (EGD) . The patient did not answer the call and left a voice message requesting a call back. A letter will be mailed to the address on file and a message will be sent to the ordering provider. 2nd attempt

## 2025-08-08 NOTE — TELEPHONE ENCOUNTER
Dear Referring Provider and Staff,    The Endoscopy Scheduling Department has made 2+ attempts to reach the patient for scheduling their Upper Endoscopy (EGD). All final attempts have been made for this referral, if the referring provider would like the patient to receive endoscopic care, please confirm with the patient whether they would like to proceed. If so, then submit a new referral and inform the Pt that the Endoscopy Scheduling department will be contacting them to schedule their procedure.      Thank you,    Endoscopy Scheduling Department

## 2025-09-04 ENCOUNTER — TELEPHONE (OUTPATIENT)
Dept: INTERNAL MEDICINE | Facility: CLINIC | Age: 45
End: 2025-09-04
Payer: COMMERCIAL

## 2025-09-04 DIAGNOSIS — M54.9 BACK PAIN, UNSPECIFIED BACK LOCATION, UNSPECIFIED BACK PAIN LATERALITY, UNSPECIFIED CHRONICITY: Primary | ICD-10-CM

## 2025-09-05 ENCOUNTER — PATIENT MESSAGE (OUTPATIENT)
Dept: INTERNAL MEDICINE | Facility: CLINIC | Age: 45
End: 2025-09-05
Payer: COMMERCIAL